# Patient Record
Sex: FEMALE | Race: WHITE | Employment: FULL TIME | URBAN - METROPOLITAN AREA
[De-identification: names, ages, dates, MRNs, and addresses within clinical notes are randomized per-mention and may not be internally consistent; named-entity substitution may affect disease eponyms.]

---

## 2017-05-31 ENCOUNTER — TELEPHONE (OUTPATIENT)
Dept: FAMILY MEDICINE | Facility: CLINIC | Age: 57
End: 2017-05-31

## 2017-05-31 ENCOUNTER — OFFICE VISIT (OUTPATIENT)
Dept: FAMILY MEDICINE | Facility: CLINIC | Age: 57
End: 2017-05-31
Payer: COMMERCIAL

## 2017-05-31 VITALS
TEMPERATURE: 98.9 F | HEART RATE: 79 BPM | DIASTOLIC BLOOD PRESSURE: 84 MMHG | BODY MASS INDEX: 31.53 KG/M2 | SYSTOLIC BLOOD PRESSURE: 143 MMHG | OXYGEN SATURATION: 99 % | WEIGHT: 172.4 LBS

## 2017-05-31 DIAGNOSIS — B02.9 HERPES ZOSTER WITHOUT COMPLICATION: Primary | ICD-10-CM

## 2017-05-31 PROCEDURE — 99213 OFFICE O/P EST LOW 20 MIN: CPT | Performed by: PHYSICIAN ASSISTANT

## 2017-05-31 RX ORDER — VALACYCLOVIR HYDROCHLORIDE 1 G/1
1000 TABLET, FILM COATED ORAL 3 TIMES DAILY
Qty: 21 TABLET | Refills: 0 | Status: SHIPPED | OUTPATIENT
Start: 2017-05-31 | End: 2018-03-06

## 2017-05-31 NOTE — TELEPHONE ENCOUNTER
If she can get here before noon I'll fit her in. If not, I recommend getting into urgent care when it opens tonight.

## 2017-05-31 NOTE — PROGRESS NOTES
SUBJECTIVE:                                                    Muriel Porras is a 57 year old female who presents to clinic today for the following health issues:    Pain started x1.5-2 weeks ago   Under left breast wrapping around rib cage     Rash     Onset: x1 day     Description:   Location: under left breast wrapping around rib cage   Character: pink red irritated   Itching (Pruritis): YES- tingling and painful     Progression of Symptoms:  worsening    Accompanying Signs & Symptoms:  Fever: no   Body aches or joint pain: no   Sore throat symptoms: no   Recent cold symptoms: no    History:   Previous similar rash: no     Precipitating factors:   Exposure to similar rash: no   New exposures: None   Recent travel: no     Alleviating factors:  None      Therapies Tried and outcome: ibuprofen - minimal relief         Problem list and histories reviewed & adjusted, as indicated.  Additional history: as documented    Patient Active Problem List   Diagnosis     Hypothyroidism due to acquired atrophy of thyroid     No past surgical history on file.    Social History   Substance Use Topics     Smoking status: Former Smoker     Packs/day: 1.00     Types: Cigarettes     Quit date: 4/1/2016     Smokeless tobacco: Never Used     Alcohol use 0.0 oz/week     0 Standard drinks or equivalent per week      Comment: Occasional      Family History   Problem Relation Age of Onset     Family history unknown: Yes           Reviewed and updated as needed this visit by clinical staff  Tobacco  Allergies  Meds       Reviewed and updated as needed this visit by Provider         ROS:  Constitutional, skin, neuro systems are negative, except as otherwise noted.    OBJECTIVE:                                                    /84  Pulse 79  Temp 98.9  F (37.2  C) (Oral)  Wt 172 lb 6.4 oz (78.2 kg)  SpO2 99%  BMI 31.53 kg/m2  Body mass index is 31.53 kg/(m^2).  GENERAL APPEARANCE: healthy, alert and no distress  SKIN:  erythematous, raised rash of left torso, underneath breast  PSYCH: mentation appears normal and affect normal/bright       ASSESSMENT:                                                      1. Herpes zoster without complication         PLAN:                                                    Valtrex 1g TID x7 days. Supportive therapy also discussed. Follow up if symptoms fail to improve or worsen.      Patient Instructions     Shingles  Shingles is a viral infection caused by the same virus as chicken pox. Anyone who has had chicken pox may get shingles later in life. The virus stays in the body, but remains dormant (asleep). Shingles often occurs in older persons or persons with lowered immunity. But it can affect anyone at any age.  Shingles starts as a tingling patch of skin on one side of the body. Small, painful blisters may then appear. The rash does not spread to the rest of the body.  Exposure to shingles cannot cause shingles. However, it can cause chicken pox in anyone who has not had chicken pox or has not been vaccinated. The contagious period ends when all blisters have crusted over (generally about 2 weeks after the illness begins).  After the blisters heal, the affected skin may be sensitive or painful for months (neuralgia). This often gradually goes away.  A shingles vaccine is available. This can help prevent shingles or make it less painful. It is generally recommended for adults over the age of 60 who have had chicken pox in the past, but who have never had shingles. Adults over 60 who have had neither chicken pox nor shingles can prevent both diseases with the chicken pox vaccine. Ask your healthcare provider about these vaccines.  Home care    Medicines may be prescribed to help relieve pain. Take these medicines as directed. Ask your healthcare provider or pharmacist before using over-the-counter medicines for helping treat pain and itching.     In certain cases, antiviral medicines may be  prescribed to reduce pain, shorten the illness, and prevent neuralgia. Take these medicines as directed.    Compresses made from a solution of cool water mixed with cornstarch or baking soda may help relieve pain and itching.     Gently wash skin daily with soap and water to help prevent infection.  Be certain to rinse off all of the soap, which can be irritating.    Trim fingernails and try not to scratch. Scratching the sores may leave scars.    Stay home from work or school until all blisters have formed a crust and you are no longer contagious.  Follow-up care  Follow up with your healthcare provider or as directed by our staff.  When to seek medical advice    Fever of 100.4 F (38 C) or higher, or as directed by your healthcare provider    Affected skin is on the face or neck and any of the following occur:                          Headache                          Eye pain                          Changes in vision                          Sores near the eye                          Weakness of facial muscles    Pain, redness, or swelling of a joint    Signs of skin infection: colored drainage from the sores, warmth, increasing redness, or increasing pain    1079-3086 [employer]. [address] [place] [address], [place] [address]. All rights reserved. This information is not intended as a substitute for professional medical care. Always follow your healthcare professional's instructions.             Shruthi Osorio PA-C  [place] [facility]

## 2017-05-31 NOTE — TELEPHONE ENCOUNTER
Patient was scheduled to come in tomorrow June 01, 2017 at 1:20 PM however after speaking to the RN line patient feels that her symptoms ( pain under breast that wraps around to back as well as a rash) are getting worst and thinks she might have shingles. Patient cant be reach at [phone] and stated it was okay to leave a detail message.     Sera Jones

## 2017-05-31 NOTE — NURSING NOTE
"Chief Complaint   Patient presents with     Rib Pain       Initial /84  Pulse 79  Temp 98.9  F (37.2  C) (Oral)  Wt 172 lb 6.4 oz (78.2 kg)  SpO2 99%  BMI 31.53 kg/m2 Estimated body mass index is 31.53 kg/(m^2) as calculated from the following:    Height as of 8/10/16: 5' 2\" (1.575 m).    Weight as of this encounter: 172 lb 6.4 oz (78.2 kg).  Medication Reconciliation: complete   Luna Michaels MA      "

## 2017-05-31 NOTE — TELEPHONE ENCOUNTER
Will send to provider to see if she can work patient in to schedule.  No openings at [place].  Work in or urgent care?

## 2017-05-31 NOTE — PATIENT INSTRUCTIONS
Shingles  Shingles is a viral infection caused by the same virus as chicken pox. Anyone who has had chicken pox may get shingles later in life. The virus stays in the body, but remains dormant (asleep). Shingles often occurs in older persons or persons with lowered immunity. But it can affect anyone at any age.  Shingles starts as a tingling patch of skin on one side of the body. Small, painful blisters may then appear. The rash does not spread to the rest of the body.  Exposure to shingles cannot cause shingles. However, it can cause chicken pox in anyone who has not had chicken pox or has not been vaccinated. The contagious period ends when all blisters have crusted over (generally about 2 weeks after the illness begins).  After the blisters heal, the affected skin may be sensitive or painful for months (neuralgia). This often gradually goes away.  A shingles vaccine is available. This can help prevent shingles or make it less painful. It is generally recommended for adults over the age of 60 who have had chicken pox in the past, but who have never had shingles. Adults over 60 who have had neither chicken pox nor shingles can prevent both diseases with the chicken pox vaccine. Ask your healthcare provider about these vaccines.  Home care    Medicines may be prescribed to help relieve pain. Take these medicines as directed. Ask your healthcare provider or pharmacist before using over-the-counter medicines for helping treat pain and itching.     In certain cases, antiviral medicines may be prescribed to reduce pain, shorten the illness, and prevent neuralgia. Take these medicines as directed.    Compresses made from a solution of cool water mixed with cornstarch or baking soda may help relieve pain and itching.     Gently wash skin daily with soap and water to help prevent infection.  Be certain to rinse off all of the soap, which can be irritating.    Trim fingernails and try not to scratch. Scratching the sores  may leave scars.    Stay home from work or school until all blisters have formed a crust and you are no longer contagious.  Follow-up care  Follow up with your healthcare provider or as directed by our staff.  When to seek medical advice    Fever of 100.4 F (38 C) or higher, or as directed by your healthcare provider    Affected skin is on the face or neck and any of the following occur:                          Headache                          Eye pain                          Changes in vision                          Sores near the eye                          Weakness of facial muscles    Pain, redness, or swelling of a joint    Signs of skin infection: colored drainage from the sores, warmth, increasing redness, or increasing pain    2713-6337 [employer]. [address] [place] [address]. All rights reserved. This information is not intended as a substitute for professional medical care. Always follow your healthcare professional's instructions.

## 2017-05-31 NOTE — MR AVS SNAPSHOT
After Visit Summary   5/31/2017    Muriel Porras    MRN: [id number]           Patient Information     Date Of Birth          1960        Visit Information        Provider Department      5/31/2017 11:40 AM Shruthi Osorio PA-C [facility]        Today's Diagnoses     Herpes zoster without complication    -  1      Care Instructions      Shingles  Shingles is a viral infection caused by the same virus as chicken pox. Anyone who has had chicken pox may get shingles later in life. The virus stays in the body, but remains dormant (asleep). Shingles often occurs in older persons or persons with lowered immunity. But it can affect anyone at any age.  Shingles starts as a tingling patch of skin on one side of the body. Small, painful blisters may then appear. The rash does not spread to the rest of the body.  Exposure to shingles cannot cause shingles. However, it can cause chicken pox in anyone who has not had chicken pox or has not been vaccinated. The contagious period ends when all blisters have crusted over (generally about 2 weeks after the illness begins).  After the blisters heal, the affected skin may be sensitive or painful for months (neuralgia). This often gradually goes away.  A shingles vaccine is available. This can help prevent shingles or make it less painful. It is generally recommended for adults over the age of 60 who have had chicken pox in the past, but who have never had shingles. Adults over 60 who have had neither chicken pox nor shingles can prevent both diseases with the chicken pox vaccine. Ask your healthcare provider about these vaccines.  Home care    Medicines may be prescribed to help relieve pain. Take these medicines as directed. Ask your healthcare provider or pharmacist before using over-the-counter medicines for helping treat pain and itching.     In certain cases, antiviral medicines may be prescribed to reduce pain, shorten the illness, and  prevent neuralgia. Take these medicines as directed.    Compresses made from a solution of cool water mixed with cornstarch or baking soda may help relieve pain and itching.     Gently wash skin daily with soap and water to help prevent infection.  Be certain to rinse off all of the soap, which can be irritating.    Trim fingernails and try not to scratch. Scratching the sores may leave scars.    Stay home from work or school until all blisters have formed a crust and you are no longer contagious.  Follow-up care  Follow up with your healthcare provider or as directed by our staff.  When to seek medical advice    Fever of 100.4 F (38 C) or higher, or as directed by your healthcare provider    Affected skin is on the face or neck and any of the following occur:                          Headache                          Eye pain                          Changes in vision                          Sores near the eye                          Weakness of facial muscles    Pain, redness, or swelling of a joint    Signs of skin infection: colored drainage from the sores, warmth, increasing redness, or increasing pain    3193-9226 [employer]. [address] [place] [address]. All rights reserved. This information is not intended as a substitute for professional medical care. Always follow your healthcare professional's instructions.                Follow-ups after your visit        Your next 10 appointments already scheduled     May 31, 2017 11:40 AM CDT   (Arrive by 10:00 AM)   Office Visit with Shruthi Osorio PA-C   [facility] ([facility])    [address] [facility] [id number] [phone]           Bring a current list of meds and any records pertaining to this visit.  For Physicals, please bring immunization records and any forms needing to be filled out.  Please arrive 10 minutes early to complete paperwork.              Who to contact     Normal or  non-critical lab and imaging results will be communicated to you by [employer] Juices[employer], letter or phone within 4 business days after the clinic has received the results. If you do not hear from us within 7 days, please contact the clinic through [employer] or phone. If you have a critical or abnormal lab result, we will notify you by phone as soon as possible.  Submit refill requests through [employer] or call your pharmacy and they will forward the refill request to us. Please allow 3 business days for your refill to be completed.          If you need to speak with a  for additional information , please call: [phone]             Additional Information About Your Visit        [employer] Information     [employer] gives you secure access to your electronic health record. If you see a primary care provider, you can also send messages to your care team and make appointments. If you have questions, please call your primary care clinic.  If you do not have a primary care provider, please call [phone] and they will assist you.        Care EveryWhere ID     This is your Care EveryWhere ID. This could be used by other organizations to access your [facility] medical records  [id number]        Your Vitals Were     Pulse Temperature Pulse Oximetry BMI (Body Mass Index)          79 98.9  F (37.2  C) (Oral) 99% 31.53 kg/m2         Blood Pressure from Last 3 Encounters:   05/31/17 143/84   12/27/16 126/74   05/25/16 118/75    Weight from Last 3 Encounters:   05/31/17 172 lb 6.4 oz (78.2 kg)   12/27/16 178 lb (80.7 kg)   10/04/16 169 lb (76.7 kg)              Today, you had the following     No orders found for display         Today's Medication Changes          These changes are accurate as of: 5/31/17 11:38 AM.  If you have any questions, ask your nurse or doctor.               Start taking these medicines.        Dose/Directions    valACYclovir 1000 mg tablet   Commonly known as:  VALTREX   Used for:  Herpes zoster without  complication   Started by:  Shruthi Osorio PA-C        Dose:  1000 mg   Take 1 tablet (1,000 mg) by mouth 3 times daily for 7 days   Quantity:  21 tablet   Refills:  0            Where to get your medicines      These medications were sent to [facility] - [address] [facility] [address]  [address] [address] [place] [address]     Phone:  [phone]     valACYclovir 1000 mg tablet                Primary Care Provider Office Phone #    [facility] [phone]       No address on file        Thank you!     Thank you for choosing [facility]  for your care. Our goal is always to provide you with excellent care. Hearing back from our patients is one way we can continue to improve our services. Please take a few minutes to complete the written survey that you may receive in the mail after your visit with us. Thank you!             Your Updated Medication List - Protect others around you: Learn how to safely use, store and throw away your medicines at www.disposemymeds.org.          This list is accurate as of: 5/31/17 11:38 AM.  Always use your most recent med list.                   Brand Name Dispense Instructions for use    LEVOTHYROXINE SODIUM PO          valACYclovir 1000 mg tablet    VALTREX    21 tablet    Take 1 tablet (1,000 mg) by mouth 3 times daily for 7 days

## 2017-05-31 NOTE — NURSING NOTE
Reminder placed to call patient in September for physical as she was not ready to schedule when in clinic today.

## 2017-06-03 ENCOUNTER — TELEPHONE (OUTPATIENT)
Dept: NURSING | Facility: CLINIC | Age: 57
End: 2017-06-03

## 2017-06-03 NOTE — TELEPHONE ENCOUNTER
Patient was seen by Dr. Lopez at [facility] on 5/3117 and diagnosed with shingles.  Started on antiviral medication.  Calls today and reports increased intensity of pain and new outbreaks of rash.  Paged on call, Dr. Fariha Urena MD via answering service at 1120.  Call back from Dr. Urena.  New order:    Gabapentin 100 mg.   Take 1 pill by mouth BID today  Beginning tomorrow take 1 pill TID.   Dispense 90 pills.  No refills.  TORB Dr. Urena/Catherine Mckinnon RN       Called prescription to [facility] on [address] [facility] per pt request.       Patient may also take ibuprofen 600 mg three times per day with food. And Tylenol 325 mg 2 tabs every 6 hours.     If pain not improving in a few days, call Dr. Person and see about increasing frequency of gabapentin.     Notified patient of these instructions and called Gabapentin prescription into [facility].  VELIA Valiente

## 2017-06-03 NOTE — PATIENT INSTRUCTIONS
Paged on call, Dr. Fariha Urena MD via answering service at 1120.  Call back from Dr. Urena.  New order:    Gabapentin 100 mg.   Take 1 pill by mouth BID today  Beginning tomorrow take 1 pill TID.   Dispense 90 pills.  No refills.  TORB Dr. Urena/Catherine Mckinnon RN       Called prescription to [facility] on [address] in [place] per pt request.       Patient may also take ibuprofen 600 mg three times per day with food. And Tylenol 325 mg 2 tabs every 6 hours.     If pain not improving in a few days, call Dr. Person and see about increasing frequency of gabapentin.     Notified patient of these instructions and called Gabapentin prescription into [facility].  VELIA Valiente

## 2017-06-05 ENCOUNTER — TELEPHONE (OUTPATIENT)
Dept: FAMILY MEDICINE | Facility: CLINIC | Age: 57
End: 2017-06-05

## 2017-06-05 NOTE — TELEPHONE ENCOUNTER
Discussed course with pt , as long as blisters are covered she can go back to work .  She states her work wont let her if she is contagious

## 2017-06-05 NOTE — TELEPHONE ENCOUNTER
Patient diagnosed with shingles last Wednesday. She has now developed some blisters. At what point can she go back to work? How long is she contagious?  Ok to leave a message.

## 2017-06-08 ENCOUNTER — MYC MEDICAL ADVICE (OUTPATIENT)
Dept: FAMILY MEDICINE | Facility: CLINIC | Age: 57
End: 2017-06-08

## 2017-06-08 NOTE — LETTER
[facility]  [address]  [place] [address]  Phone: [phone]    June 8, 2017        Muriel Porras  [address] [address]          To whom it may concern:    RE: Muriel Porras    Patient was seen and treated at our clinic on 5/31/17. She may return to work as of tomorrow, 6/9/17. As long as the shingles rash is covered she is not considered contagious. Shingles only spreads by contact with open, oozing blisters.    Please contact me for questions or concerns.      Sincerely,          Shruthi Osorio PA-C

## 2017-06-08 NOTE — LETTER
[facility]  [address]  [place] [address]  Phone: [phone]    June 9, 2017        Muriel Porras  [address] [address]          To whom it may concern:    RE: Muriel Porras    Patient was seen and treated at our clinic on 5/31/17. She may return to work as of 6/12/17. As long as the shingles rash is covered she is not considered contagious. Shingles only spreads by contact with open, oozing blisters.    Please contact me for questions or concerns.      Sincerely,          Shruthi Osorio PA-C

## 2017-12-31 ENCOUNTER — HEALTH MAINTENANCE LETTER (OUTPATIENT)
Age: 57
End: 2017-12-31

## 2018-03-06 ENCOUNTER — OFFICE VISIT (OUTPATIENT)
Dept: PODIATRY | Facility: CLINIC | Age: 58
End: 2018-03-06
Payer: COMMERCIAL

## 2018-03-06 VITALS
HEIGHT: 63 IN | WEIGHT: 142 LBS | BODY MASS INDEX: 25.16 KG/M2 | DIASTOLIC BLOOD PRESSURE: 56 MMHG | SYSTOLIC BLOOD PRESSURE: 114 MMHG

## 2018-03-06 DIAGNOSIS — M21.6X9 ACQUIRED PRONATION DEFORMITY OF ANKLE, UNSPECIFIED LATERALITY: Primary | ICD-10-CM

## 2018-03-06 DIAGNOSIS — M76.822 POSTERIOR TIBIAL TENDINITIS OF LEFT LOWER EXTREMITY: ICD-10-CM

## 2018-03-06 PROCEDURE — 99213 OFFICE O/P EST LOW 20 MIN: CPT | Performed by: PODIATRIST

## 2018-03-06 NOTE — PATIENT INSTRUCTIONS
We wish you continued good healing. If you have any questions or concerns, please do not hesitate to contact us at [phone]    Please remember to call and schedule a follow up appointment if one was recommended at your earliest convenience.   PODIATRY CLINIC HOURS  TELEPHONE NUMBER    Dr. [place] Anaya D.P.M [facility]    Clinics:  [facility]    [facility] [place] [facility]   Tuesday 1PM-6PM  [place]  Wednesday 7AM-2PM  [facility]  Thursday 10AM-6PM  [place]  Friday 7AM-3PM  [place]  Specialty schedulers:   [phone] to make an appointment with any Specialty Provider.        Urgent Care locations:    [facility] Monday-Friday 5 pm - 9 pm. Saturday-Sunday 9 am -5pm    Monday-Friday 11 am - 9 pm Saturday 9 am - 5 pm     Monday-Sunday 12 noon-8PM [phone]     If you need a medication refill, please contact us you may need lab work and/or a follow up visit prior to your refill (i.e. Antifungal medications).    Remindt (secure e-mail communication and access to your chart) to send a message or to make an appointment.    If MRI needed please call [place] [facility] at [phone]        Weight management plan: Patient was referred to their PCP to discuss a diet and exercise plan.

## 2018-03-06 NOTE — MR AVS SNAPSHOT
After Visit Summary   3/6/2018    Muriel Porras    MRN: [id number]           Patient Information     Date Of Birth          1960        Visit Information        Provider Department      3/6/2018 5:45 PM [place] Anaya DPM [facility] [place]        Care Instructions    We wish you continued good healing. If you have any questions or concerns, please do not hesitate to contact us at [phone]    Please remember to call and schedule a follow up appointment if one was recommended at your earliest convenience.   PODIATRY CLINIC HOURS  TELEPHONE NUMBER    Dr. [place] Anaya DKennyPKARYN FAC FAS    Clinics:  [facility]    [facility]   Tuesday 1PM-6PM  [facility]  Wednesday 7AM-2PM  [facility]  Thursday 10AM-6PM  [place]  Friday 7AM-3PM  [place]  Specialty schedulers:   [phone] to make an appointment with any Specialty Provider.        Urgent Care locations:    [facility] Monday-Friday 5 pm - 9 pm. Saturday-Sunday 9 am -5pm    Monday-Friday 11 am - 9 pm Saturday 9 am - 5 pm     Monday-Sunday 12 noon-8PM [phone]     If you need a medication refill, please contact us you may need lab work and/or a follow up visit prior to your refill (i.e. Antifungal medications).    [employer] (secure e-mail communication and access to your chart) to send a message or to make an appointment.    If MRI needed please call [facility] at [phone]        Weight management plan: Patient was referred to their PCP to discuss a diet and exercise plan.            Follow-ups after your visit        Your next 10 appointments already scheduled     Mar 06, 2018  5:45 PM CST   Return Visit with [facility] ([facility])    [address]  [address]  [place]  [phone]              Who to contact     If you have questions or need follow up information about today's clinic visit or your schedule please contact [facility] [place] directly at [phone].  Normal or non-critical lab and imaging results will be communicated to you by MyChart, letter or phone within 4 business days after the clinic has received the results. If you do not hear from us within 7 days, please contact the clinic through [employer] or phone. If you have a critical or abnormal lab result, we will notify you by phone as soon as possible.  Submit refill requests through [employer] or call your pharmacy and they will forward the refill request to us. Please allow 3 business days for your refill to be completed.          Additional Information About Your Visit        [employer] Information     [employer] gives you secure access to your electronic health record. If you see a primary care provider, you can also send messages to your care team and make appointments. If you have questions, please call your primary care clinic.  If you do not have a primary care provider, please call [phone] and they will assist you.        Care EveryWhere ID     This is your Care EveryWhere ID. This could be used by other organizations to access your [place] medical records  [id number]        Your Vitals Were     Height BMI (Body Mass Index)                5' 3\" (1.6 m) 25.15 kg/m2           Blood Pressure from Last 3 Encounters:   03/06/18 114/56   05/31/17 143/84   12/27/16 126/74    Weight from Last 3 Encounters:   03/06/18 142 lb (64.4 kg)   05/31/17 172 lb 6.4 oz (78.2 kg)   12/27/16 178 lb (80.7 kg)              Today, you had the following     No orders found for display       Primary Care Provider Office Phone # Fax #    Shruthi Osorio PA-C [phone]       [address] [place] [address]        Equal Access to Services     DILCIA HERMAN AH: Hadii aad ku " maria teresa Martinez, nomi garsiaangieha, oscar katayla ghadalisa, ede christianoin hayaajeana urrutiajenn rajendracarly laarletjeana johnathon. So [facility] [phone].    ATENCIÓN: Si habla español, tiene a perez disposición servicios gratuitos de asistencia lingüística. Kaden al [phone].    We comply with applicable federal civil rights laws and [place] laws. We do not discriminate on the basis of race, color, national origin, age, disability, sex, sexual orientation, or gender identity.            Thank you!     Thank you for choosing [facility]  for your care. Our goal is always to provide you with excellent care. Hearing back from our patients is one way we can continue to improve our services. Please take a few minutes to complete the written survey that you may receive in the mail after your visit with us. Thank you!             Your Updated Medication List - Protect others around you: Learn how to safely use, store and throw away your medicines at www.disposemymeds.org.          This list is accurate as of 3/6/18  5:37 PM.  Always use your most recent med list.                   Brand Name Dispense Instructions for use Diagnosis    LEVOTHYROXINE SODIUM PO

## 2018-03-06 NOTE — LETTER
"    3/6/2018         RE: Muriel Porras  [address]  [place] [place] [address]        Dear Colleague,    Thank you for referring your patient, Muriel Porras, to the [facility]. Please see a copy of my visit note below.    S:  Complains of left foot pain.  Points to posterior tibial tendon where is courses around the medial malleoli.  Has had this for 3 months.  Describes it as a burning pain.  Aggrevated by activity and relieved by rest.  Getting better with compression on ankle, less edema.  Standing at work.  She wears p wing orthotics all the time, even in the house.  denies weakness.  denies drop in arch.  admitsedema.  denies ecchymosis.    ROS:  A 10-point review of systems was performed and is positive for that noted in the HPI and as seen below.  All other areas are negative.        Allergies   Allergen Reactions     Aspirin      Mouth lesions        Current Outpatient Prescriptions   Medication Sig Dispense Refill     LEVOTHYROXINE SODIUM PO          Patient Active Problem List   Diagnosis     Hypothyroidism due to acquired atrophy of thyroid       Past Medical History:   Diagnosis Date     Thyroid disease        No past surgical history on file.    Family History   Problem Relation Age of Onset     Family history unknown: Yes       Social History   Substance Use Topics     Smoking status: Former Smoker     Packs/day: 1.00     Types: Cigarettes     Quit date: 4/1/2016     Smokeless tobacco: Never Used     Alcohol use 0.0 oz/week     0 Standard drinks or equivalent per week      Comment: Occasional          Exam:  Vitals: /56 (BP Location: Right arm, Patient Position: Sitting, Cuff Size: Adult Regular)  Ht 5' 3\" (1.6 m)  Wt 142 lb (64.4 kg)  BMI 25.15 kg/m2  BMI: Body mass index is 25.15 kg/(m^2).  Height: 5' 3\"    Constitutional/ general:  Pt is in no apparent distress, appears well-nourished.  Cooperative with history and physical exam.     Psych:  The patient " answered questions appropriately.  Normal affect.  Seems to have reasonable expectations, in terms of treatment.     Eyes:  Visual scanning/ tracking without deficit.     Ears:  Response to auditory stimuli is normal.  No  hearing aid devices.  Auricles in proper alignment.     Lymphatic:  Popliteal lymph nodes not enlarged.     Lungs:  Non labored breathing, non labored speech. No cough.  No audible wheezing. Even, quiet breathing.       Vascular:  Pedal pulses are palpable bilaterally for both the DP and PT arteries.  CFT < 3 sec.  mild ankle edema and varicosities.  Pedal hair growth noted.     Neuro:  Alert and oriented x 3. Coordinated gait.  Light touch sensation is intact to the L4, L5, S1 distributions. No obvious deficits.  No evidence of neurological-based weakness, spasticity, or contracture in the lower extremities.    Derm: Normal texture and turgor.  No erythema, ecchymosis, or cyanosis.  No open lesions.     Musculoskeletal:    Lower extremity muscle strength is normal.  Patient is ambulatory without an assistive device or brace.  No gross deformities.  Normal ROM all fore foot and rearfoot joints.  No equinus.  With weightbearing patient has bilateral pronation with left being slightly worse.  No pain with ROM.   Pain with palpation posterior tibial tendon coarse around medial malleoli.  positive pain with stressing posterior tibial tendon.  Good calcaneal iversion with foot flexion.  negative erythema.  positive edema.  negative ecchymosis.   negative masses noted.        A:  Pronation and posterior tibial tendonitis    P:  Discussed the cause of this with the patient.  Dispensed ankle brace to be worn with good shoes at all times.  Ice bid.  Minimize activities until healed.  Explained must have good support for feet at all times or could develop tear in tendon and may need surgery.    Discussed importance of wearing these in a good shoe at all times to prevent future problems.   Return to clinic 2  weeks to ensure this is getting better.      [place] Anaya DPM, FACFAS           Again, thank you for allowing me to participate in the care of your patient.        Sincerely,        [place] Anaya DPM

## 2018-03-07 NOTE — PROGRESS NOTES
"S:  Complains of left foot pain.  Points to posterior tibial tendon where is courses around the medial malleoli.  Has had this for 3 months.  Describes it as a burning pain.  Aggrevated by activity and relieved by rest.  Getting better with compression on ankle, less edema.  Standing at work.  She wears p wing orthotics all the time, even in the house.  denies weakness.  denies drop in arch.  admitsedema.  denies ecchymosis.    ROS:  A 10-point review of systems was performed and is positive for that noted in the HPI and as seen below.  All other areas are negative.        Allergies   Allergen Reactions     Aspirin      Mouth lesions        Current Outpatient Prescriptions   Medication Sig Dispense Refill     LEVOTHYROXINE SODIUM PO          Patient Active Problem List   Diagnosis     Hypothyroidism due to acquired atrophy of thyroid       Past Medical History:   Diagnosis Date     Thyroid disease        No past surgical history on file.    Family History   Problem Relation Age of Onset     Family history unknown: Yes       Social History   Substance Use Topics     Smoking status: Former Smoker     Packs/day: 1.00     Types: Cigarettes     Quit date: 4/1/2016     Smokeless tobacco: Never Used     Alcohol use 0.0 oz/week     0 Standard drinks or equivalent per week      Comment: Occasional          Exam:  Vitals: /56 (BP Location: Right arm, Patient Position: Sitting, Cuff Size: Adult Regular)  Ht 5' 3\" (1.6 m)  Wt 142 lb (64.4 kg)  BMI 25.15 kg/m2  BMI: Body mass index is 25.15 kg/(m^2).  Height: 5' 3\"    Constitutional/ general:  Pt is in no apparent distress, appears well-nourished.  Cooperative with history and physical exam.     Psych:  The patient answered questions appropriately.  Normal affect.  Seems to have reasonable expectations, in terms of treatment.     Eyes:  Visual scanning/ tracking without deficit.     Ears:  Response to auditory stimuli is normal.  No  hearing aid devices.  Auricles in " proper alignment.     Lymphatic:  Popliteal lymph nodes not enlarged.     Lungs:  Non labored breathing, non labored speech. No cough.  No audible wheezing. Even, quiet breathing.       Vascular:  Pedal pulses are palpable bilaterally for both the DP and PT arteries.  CFT < 3 sec.  mild ankle edema and varicosities.  Pedal hair growth noted.     Neuro:  Alert and oriented x 3. Coordinated gait.  Light touch sensation is intact to the L4, L5, S1 distributions. No obvious deficits.  No evidence of neurological-based weakness, spasticity, or contracture in the lower extremities.    Derm: Normal texture and turgor.  No erythema, ecchymosis, or cyanosis.  No open lesions.     Musculoskeletal:    Lower extremity muscle strength is normal.  Patient is ambulatory without an assistive device or brace.  No gross deformities.  Normal ROM all fore foot and rearfoot joints.  No equinus.  With weightbearing patient has bilateral pronation with left being slightly worse.  No pain with ROM.   Pain with palpation posterior tibial tendon coarse around medial malleoli.  positive pain with stressing posterior tibial tendon.  Good calcaneal iversion with foot flexion.  negative erythema.  positive edema.  negative ecchymosis.   negative masses noted.        A:  Pronation and posterior tibial tendonitis    P:  Discussed the cause of this with the patient.  Dispensed ankle brace to be worn with good shoes at all times.  Ice bid.  Minimize activities until healed.  Explained must have good support for feet at all times or could develop tear in tendon and may need surgery.    Discussed importance of wearing these in a good shoe at all times to prevent future problems.   Return to clinic 2 weeks to ensure this is getting better.      [place] Anaya DPM, FACFAS

## 2018-06-07 ENCOUNTER — TRANSFERRED RECORDS (OUTPATIENT)
Dept: HEALTH INFORMATION MANAGEMENT | Facility: CLINIC | Age: 58
End: 2018-06-07

## 2018-06-12 ENCOUNTER — HOSPITAL ENCOUNTER (OUTPATIENT)
Dept: MRI IMAGING | Facility: CLINIC | Age: 58
Discharge: HOME | End: 2018-06-12
Attending: PODIATRIST | Admitting: PODIATRIST
Payer: COMMERCIAL

## 2018-06-12 DIAGNOSIS — M79.672 LEFT FOOT PAIN: ICD-10-CM

## 2018-06-12 PROCEDURE — 73721 MRI JNT OF LWR EXTRE W/O DYE: CPT | Mod: LT

## 2018-06-21 ENCOUNTER — TRANSFERRED RECORDS (OUTPATIENT)
Dept: HEALTH INFORMATION MANAGEMENT | Facility: CLINIC | Age: 58
End: 2018-06-21

## 2018-11-20 ENCOUNTER — OFFICE VISIT (OUTPATIENT)
Dept: PODIATRY | Facility: CLINIC | Age: 58
End: 2018-11-20
Payer: COMMERCIAL

## 2018-11-20 VITALS
BODY MASS INDEX: 27.46 KG/M2 | WEIGHT: 155 LBS | HEART RATE: 100 BPM | SYSTOLIC BLOOD PRESSURE: 130 MMHG | DIASTOLIC BLOOD PRESSURE: 70 MMHG

## 2018-11-20 DIAGNOSIS — L60.0 INGROWING NAIL: Primary | ICD-10-CM

## 2018-11-20 PROCEDURE — 99213 OFFICE O/P EST LOW 20 MIN: CPT | Performed by: PODIATRIST

## 2018-11-20 NOTE — MR AVS SNAPSHOT
After Visit Summary   11/20/2018    Muriel Porras    MRN: [id number]           Patient Information     Date Of Birth          1960        Visit Information        Provider Department      11/20/2018 1:15 PM [place] [facility]aya DPM [facility]        Today's Diagnoses     Ingrowing nail    -  1      Care Instructions    We wish you continued good healing. If you have any questions or concerns, please do not hesitate to contact us at [phone]    Please remember to call and schedule a follow up appointment if one was recommended at your earliest convenience.   PODIATRY CLINIC HOURS  TELEPHONE NUMBER    Dr. [place] MUHAMMADPKARYN FAC FAS    Clinics:  [facility]    [facility] [place] [facility]   Tuesday 1PM-6PM  [place]  Wednesday 7AM-2PM  [facility]  Thursday 10AM-6PM  [place]  Friday 7AM-3PM  [place]  Specialty schedulers:   [phone] to make an appointment with any Specialty Provider.        Urgent Care locations:    [facility] Monday-Friday 5 pm - 9 pm. Saturday-Sunday 9 am -5pm    Monday-Friday 11 am - 9 pm Saturday 9 am - 5 pm     Monday-Sunday 12 noon-8PM [phone]     If you need a medication refill, please contact us you may need lab work and/or a follow up visit prior to your refill (i.e. Antifungal medications).    [email or web address] (secure e-mail communication and access to your chart) to send a message or to make an appointment.    If MRI needed please call [facility] at [phone]        Weight management plan: Patient was referred to their PCP to discuss a diet and exercise plan.               Follow-ups after your visit        Your next 10 appointments already scheduled     Nov 27, 2018  3:30 PM CST   Return Visit with [place] Anaya DPM   [facility] [place] ([facility])    [address]  [place] [address] [phone]              Who to contact     If you have questions or need follow up information about today's clinic visit or your schedule please contact [facility] directly at [phone].  Normal or non-critical lab and imaging results will be communicated to you by MyChart, letter or phone within 4 business days after the clinic has received the results. If you do not hear from us within 7 days, please contact the clinic through [employer] or phone. If you have a critical or abnormal lab result, we will notify you by phone as soon as possible.  Submit refill requests through [employer] or call your pharmacy and they will forward the refill request to us. Please allow 3 business days for your refill to be completed.          Additional Information About Your Visit        [employer] Information     [employer] gives you secure access to your electronic health record. If you see a primary care provider, you can also send messages to your care team and make appointments. If you have questions, please call your primary care clinic.  If you do not have a primary care provider, please call [phone] and they will assist you.        Care EveryWhere ID     This is your Care EveryWhere ID. This could be used by other organizations to access your [place] medical records  [id number]        Your Vitals Were     Pulse BMI (Body Mass Index)                100 27.46 kg/m2           Blood Pressure from Last 3 Encounters:   11/20/18 130/70   03/06/18 114/56   05/31/17 143/84    Weight from Last 3 Encounters:   11/20/18 155 lb (70.3 kg)   03/06/18 142 lb (64.4 kg)   05/31/17 172 lb 6.4 oz (78.2 kg)              Today, you had the following     No orders found for display       Primary Care Provider Office Phone # Fax #    Shruthi Osorio PA-C [phone]       [address]  [place] [address]        Equal Access to Services     DILCIA HERMAN AH: Hadii aad ku  maria teresa Martinez, nomi garsiaangieha, oscar katayla ghadalisa, ede christianoin hayaajeana urrutiajenn rajendracarly laWallaceshan johnathon. So [facility] [phone].    ATENCIÓN: Si habla español, tiene a perez disposición servicios gratuitos de asistencia lingüística. Kaden al [phone].    We comply with applicable federal civil rights laws and [place] laws. We do not discriminate on the basis of race, color, national origin, age, disability, sex, sexual orientation, or gender identity.            Thank you!     Thank you for choosing [facility]  for your care. Our goal is always to provide you with excellent care. Hearing back from our patients is one way we can continue to improve our services. Please take a few minutes to complete the written survey that you may receive in the mail after your visit with us. Thank you!             Your Updated Medication List - Protect others around you: Learn how to safely use, store and throw away your medicines at www.disposemymeds.org.          This list is accurate as of 11/20/18  3:26 PM.  Always use your most recent med list.                   Brand Name Dispense Instructions for use Diagnosis    LEVOTHYROXINE SODIUM PO

## 2018-11-20 NOTE — LETTER
11/20/2018         RE: Muriel Porras  [address]  [place] [address]        Dear Colleague,    Thank you for referring your patient, Muriel Porras, to the [facility]. Please see a copy of my visit note below.    Subjective:    Pt is seen today w/ the c/c of a painful ingrown right and left great nail both border.  This has been problematic intermittently for several years.  Had permanent right lateral in pas but failed.  Oral antifungals in the past did not help. This is slowly getting worse.  Aggravated by activity and relieved by rest.  Has tried soaking which has not helped.   denies history of trauma to the area.    ROS:  A 10-point review of systems was performed and is positive for that noted in the HPI and as seen above.  All other areas are negative.          Allergies   Allergen Reactions     Aspirin      Mouth lesions        Current Outpatient Prescriptions   Medication Sig Dispense Refill     LEVOTHYROXINE SODIUM PO          Patient Active Problem List   Diagnosis     Hypothyroidism due to acquired atrophy of thyroid       Past Medical History:   Diagnosis Date     Thyroid disease        No past surgical history on file.    Family History   Problem Relation Age of Onset     Family history unknown: Yes       Social History   Substance Use Topics     Smoking status: Former Smoker     Packs/day: 1.00     Types: Cigarettes     Quit date: 4/1/2016     Smokeless tobacco: Never Used     Alcohol use 0.0 oz/week     0 Standard drinks or equivalent per week      Comment: Occasional          Exam:    Vitals: /70  Pulse 100  Wt 155 lb (70.3 kg)  BMI 27.46 kg/m2  BMI: Body mass index is 27.46 kg/(m^2).  Height: Data Unavailable    Constitutional/ general:  Pt is in no apparent distress, appears well-nourished.  Cooperative with history and physical exam.     Psych:  The patient answered questions appropriately.  Normal affect.  Seems to have reasonable expectations, in terms of  treatment.     Eyes:  Visual scanning/ tracking without deficit.     Ears:  Response to auditory stimuli is normal.  negative hearing aid devices.  Auricles in proper alignment.     Lymphatic:  Popliteal lymph nodes not enlarged.     Lungs:  Non labored breathing, non labored speech. No cough.  No audible wheezing. Even, quiet breathing.       Vascular:  positive pedal pulses bilaterally for both the DP and PT arteries.  CFT < 3 sec.  negative ankle edema.  positive pedal hair growth.    Neuro:  Alert and oriented x 3. Coordinated gait.  Light touch sensation is intact to the L4, L5, S1 distributions. No obvious deficits.  No evidence of neurological-based weakness, spasticity, or contracture in the lower extremities.     Derm: Normal texture and turgor.  No erythema, ecchymosis, or cyanosis.      Musculoskeletal:    Lower extremity muscle strength is normal.  Patient is ambulatory without an assistive device or brace.  Normal arch with weightbearing.  No forefoot or rear foot deformities noted.   Normal ROM all fore foot and rearfoot joints.  No equinus.  right and left great toe nail both border shows soft tissue impingement with localized erythema.   negative active drainage/purulence at this time.  No sinus tracts.  No nailbed masses or exostosis.  No pain with range of motion of IPJ or MTPJ.  No ascending cellulitis.    ASSESSMENT:    Onychocryptosis with paronychia right and left great both border.    Discussed etiology and treatment options in detail w/ the pt.  The potential causes and nature of an ingrown toenail were discussed with the patient.  We reviewed the natural history/prognosis of the condition and potential risks if no treatment is provided.      Treatment options discussed included conservative management (oral antibiotics, soaking of foot, adequate width shoes)  as well as surgical management (partial or total nail removal).  The pros and cons of both forms of treatment were reviewed.  Handout  given to patient.      After thorough discussion and answering all questions, the patient elected to have permanent removal of affected nail border.  Risk complications and efficacy discussed with patient.  Will set this up for 1 hour appointment in the future.      [place] Anaya DPM, FACFAS      Again, thank you for allowing me to participate in the care of your patient.        Sincerely,        [place] Anaya DPM

## 2018-11-20 NOTE — PATIENT INSTRUCTIONS
We wish you continued good healing. If you have any questions or concerns, please do not hesitate to contact us at [phone]    Please remember to call and schedule a follow up appointment if one was recommended at your earliest convenience.   PODIATRY CLINIC HOURS  TELEPHONE NUMBER    Dr. [place] Anaya D.P.M [facility]    Clinics:  [facility]    Karly Morris [facility]   Tuesday 1PM-6PM  [place]  Wednesday 7AM-2PM  [facility]  Thursday 10AM-6PM  [place]  Friday 7AM-3PM  [place]  Specialty schedulers:   [phone] to make an appointment with any Specialty Provider.        Urgent Care locations:    [facility] Monday-Friday 5 pm - 9 pm. Saturday-Sunday 9 am -5pm    Monday-Friday 11 am - 9 pm Saturday 9 am - 5 pm     Monday-Sunday 12 noon-8PM [phone]     If you need a medication refill, please contact us you may need lab work and/or a follow up visit prior to your refill (i.e. Antifungal medications).    [employer] (secure e-mail communication and access to your chart) to send a message or to make an appointment.    If MRI needed please call [facility] at [phone]        Weight management plan: Patient was referred to their PCP to discuss a diet and exercise plan.

## 2018-11-20 NOTE — PROGRESS NOTES
Subjective:    Pt is seen today w/ the c/c of a painful ingrown right and left great nail both border.  This has been problematic intermittently for several years.  Had permanent right lateral in pas but failed.  Oral antifungals in the past did not help. This is slowly getting worse.  Aggravated by activity and relieved by rest.  Has tried soaking which has not helped.   denies history of trauma to the area.    ROS:  A 10-point review of systems was performed and is positive for that noted in the HPI and as seen above.  All other areas are negative.          Allergies   Allergen Reactions     Aspirin      Mouth lesions        Current Outpatient Prescriptions   Medication Sig Dispense Refill     LEVOTHYROXINE SODIUM PO          Patient Active Problem List   Diagnosis     Hypothyroidism due to acquired atrophy of thyroid       Past Medical History:   Diagnosis Date     Thyroid disease        No past surgical history on file.    Family History   Problem Relation Age of Onset     Family history unknown: Yes       Social History   Substance Use Topics     Smoking status: Former Smoker     Packs/day: 1.00     Types: Cigarettes     Quit date: 4/1/2016     Smokeless tobacco: Never Used     Alcohol use 0.0 oz/week     0 Standard drinks or equivalent per week      Comment: Occasional          Exam:    Vitals: /70  Pulse 100  Wt 155 lb (70.3 kg)  BMI 27.46 kg/m2  BMI: Body mass index is 27.46 kg/(m^2).  Height: Data Unavailable    Constitutional/ general:  Pt is in no apparent distress, appears well-nourished.  Cooperative with history and physical exam.     Psych:  The patient answered questions appropriately.  Normal affect.  Seems to have reasonable expectations, in terms of treatment.     Eyes:  Visual scanning/ tracking without deficit.     Ears:  Response to auditory stimuli is normal.  negative hearing aid devices.  Auricles in proper alignment.     Lymphatic:  Popliteal lymph nodes not enlarged.     Lungs:   Non labored breathing, non labored speech. No cough.  No audible wheezing. Even, quiet breathing.       Vascular:  positive pedal pulses bilaterally for both the DP and PT arteries.  CFT < 3 sec.  negative ankle edema.  positive pedal hair growth.    Neuro:  Alert and oriented x 3. Coordinated gait.  Light touch sensation is intact to the L4, L5, S1 distributions. No obvious deficits.  No evidence of neurological-based weakness, spasticity, or contracture in the lower extremities.     Derm: Normal texture and turgor.  No erythema, ecchymosis, or cyanosis.      Musculoskeletal:    Lower extremity muscle strength is normal.  Patient is ambulatory without an assistive device or brace.  Normal arch with weightbearing.  No forefoot or rear foot deformities noted.   Normal ROM all fore foot and rearfoot joints.  No equinus.  right and left great toe nail both border shows soft tissue impingement with localized erythema.   negative active drainage/purulence at this time.  No sinus tracts.  No nailbed masses or exostosis.  No pain with range of motion of IPJ or MTPJ.  No ascending cellulitis.    ASSESSMENT:    Onychocryptosis with paronychia right and left great both border.    Discussed etiology and treatment options in detail w/ the pt.  The potential causes and nature of an ingrown toenail were discussed with the patient.  We reviewed the natural history/prognosis of the condition and potential risks if no treatment is provided.      Treatment options discussed included conservative management (oral antibiotics, soaking of foot, adequate width shoes)  as well as surgical management (partial or total nail removal).  The pros and cons of both forms of treatment were reviewed.  Handout given to patient.      After thorough discussion and answering all questions, the patient elected to have permanent removal of affected nail border.  Risk complications and efficacy discussed with patient.  Will set this up for 1 hour  appointment in the future.      Jsoe TSEM, FACFAS

## 2018-11-27 ENCOUNTER — OFFICE VISIT (OUTPATIENT)
Dept: PODIATRY | Facility: CLINIC | Age: 58
End: 2018-11-27
Payer: COMMERCIAL

## 2018-11-27 VITALS — DIASTOLIC BLOOD PRESSURE: 54 MMHG | WEIGHT: 155 LBS | SYSTOLIC BLOOD PRESSURE: 136 MMHG | BODY MASS INDEX: 27.46 KG/M2

## 2018-11-27 DIAGNOSIS — L60.0 INGROWING NAIL: Primary | ICD-10-CM

## 2018-11-27 PROCEDURE — 11750 EXCISION NAIL&NAIL MATRIX: CPT | Mod: TA | Performed by: PODIATRIST

## 2018-11-27 NOTE — MR AVS SNAPSHOT
After Visit Summary   11/27/2018    Muriel Porras    MRN: [id number]           Patient Information     Date Of Birth          1960        Visit Information        Provider Department      11/27/2018 3:30 PM Jose Anaya, GAL [facility]        Today's Diagnoses     Ingrowing nail    -  1      Care Instructions    We wish you continued good healing. If you have any questions or concerns, please do not hesitate to contact us at [phone]    Please remember to call and schedule a follow up appointment if one was recommended at your earliest convenience.   PODIATRY CLINIC HOURS  TELEPHONE NUMBER    Dr. Jose MUHAMMADPKARYN FAC FAS    Clinics:  [facility]    Karly Morris [facility]   Tuesday 1PM-6PM  [place]  Wednesday 7AM-2PM  [facility]  Thursday 10AM-6PM  [place]  Friday 7AM-3PM  [place]  Specialty schedulers:   [phone] to make an appointment with any Specialty Provider.        Urgent Care locations:    [facility] Monday-Friday 5 pm - 9 pm. Saturday-Sunday 9 am -5pm    Monday-Friday 11 am - 9 pm Saturday 9 am - 5 pm     Monday-Sunday 12 noon-8PM [phone]     If you need a medication refill, please contact us you may need lab work and/or a follow up visit prior to your refill (i.e. Antifungal medications).    [employer] (secure e-mail communication and access to your chart) to send a message or to make an appointment.    If MRI needed please call [facility] at [phone]        Weight management plan: Patient was referred to their PCP to discuss a diet and exercise plan.    Patient to follow up with Primary Care provider regarding elevated blood pressure.            Follow-ups after your visit        Who to contact     If you have questions or need follow up information about today's clinic visit or your  schedule please contact [facility] directly at [phone].  Normal or non-critical lab and imaging results will be communicated to you by MyChart, letter or phone within 4 business days after the clinic has received the results. If you do not hear from us within 7 days, please contact the clinic through [employer] or phone. If you have a critical or abnormal lab result, we will notify you by phone as soon as possible.  Submit refill requests through [employer] or call your pharmacy and they will forward the refill request to us. Please allow 3 business days for your refill to be completed.          Additional Information About Your Visit        [employer] Information     [employer] gives you secure access to your electronic health record. If you see a primary care provider, you can also send messages to your care team and make appointments. If you have questions, please call your primary care clinic.  If you do not have a primary care provider, please call [phone] and they will assist you.        Care EveryWhere ID     This is your Care EveryWhere ID. This could be used by other organizations to access your [place] medical records  [id number]        Your Vitals Were     BMI (Body Mass Index)                   27.46 kg/m2            Blood Pressure from Last 3 Encounters:   11/27/18 136/54   11/20/18 130/70   03/06/18 114/56    Weight from Last 3 Encounters:   11/27/18 155 lb (70.3 kg)   11/20/18 155 lb (70.3 kg)   03/06/18 142 lb (64.4 kg)              We Performed the Following     REMOVAL NAIL/NAIL BED, PARTIAL OR COMPLETE     REMOVAL NAIL/NAIL BED, PARTIAL OR COMPLETE        Primary Care Provider Office Phone # Fax #    Shruthi Osorio PA-C [phone]       [address] [place] [address]        Equal Access to Services     DILCIA HERMAN : Hadii karrie neilo [place], waaxda luqadaha, qaybta kaalmada edmund, ede diego. So  [facility] [phone].    ATENCIÓN: Si habla amanda, tiene a perez disposición servicios gratuitos de asistencia lingüística. Kaden al [phone].    We comply with applicable federal civil rights laws and [place] laws. We do not discriminate on the basis of race, color, national origin, age, disability, sex, sexual orientation, or gender identity.            Thank you!     Thank you for choosing [facility]  for your care. Our goal is always to provide you with excellent care. Hearing back from our patients is one way we can continue to improve our services. Please take a few minutes to complete the written survey that you may receive in the mail after your visit with us. Thank you!             Your Updated Medication List - Protect others around you: Learn how to safely use, store and throw away your medicines at www.disposemymeds.org.          This list is accurate as of 11/27/18  4:27 PM.  Always use your most recent med list.                   Brand Name Dispense Instructions for use Diagnosis    LEVOTHYROXINE SODIUM PO

## 2018-11-27 NOTE — LETTER
11/27/2018         RE: Muriel Porras  [address]  [place] [address]        Dear Colleague,    Thank you for referring your patient, Muriel Porras, to the [facility]. Please see a copy of my visit note below.    Subjective:    Pt is seen today for ingrown great toenail.  Points to right and left first toe both border(s).   Has had a temporary in the past.  Would like a permanent today.  Denies F/C/N/V.      ROS:  No hx of wound healing problems, or numbness       Allergies   Allergen Reactions     Aspirin      Mouth lesions        Current Outpatient Prescriptions   Medication Sig Dispense Refill     LEVOTHYROXINE SODIUM PO          Patient Active Problem List   Diagnosis     Hypothyroidism due to acquired atrophy of thyroid       Past Medical History:   Diagnosis Date     Thyroid disease        No past surgical history on file.    Family History   Problem Relation Age of Onset     Family history unknown: Yes       Social History   Substance Use Topics     Smoking status: Former Smoker     Packs/day: 1.00     Types: Cigarettes     Quit date: 4/1/2016     Smokeless tobacco: Never Used     Alcohol use 0.0 oz/week     0 Standard drinks or equivalent per week      Comment: Occasional          Objective:    /54  Wt 155 lb (70.3 kg)  BMI 27.46 kg/m2  Pulses are palpable +2/4 DP & PT bilateral.  Sensation to light touch is intact.  No fore foot deformities are noted.  Normal ROM all forefoot joints.  No evidence of deep abscess or infection noted.  Pain on palpation of right and left first toe both borders.  Erythema, edema, and some proud tissue noted.  Nail appears to be incurvated on the affected border.     Assessment:  Onychocryptosis with paronychia right and left first toe both border(s)    Plan:  Discussed etiology and treatment options with the pt.  The potential causes and nature of an ingrown toenail were discussed with the patient.  We reviewed the natural  history/prognosis of the condition and potential risks if no treatment is provided.      Treatment options discussed included conservative management (oral antibiotics, soaking of foot, adequate width shoes)  as well as surgical management (partial or total nail removal).  The pros and cons of both forms of treatment were reviewed.  Handout dispensed.       After thorough discussion and answering all questions, the patient elected to have permanent removal of right and left first toe both borders.  Risk complications and efficacy discussed with patient.  Obtained consent, used 3cc of 1% lidocaine plain to block aformentioned toe(s).  Sterile prep, then avulsed right and left first toe both border(s).  No evidence of deep abscess noted.  Phenol was applied times 3 at 30 second intervals with curettage in between and then alcohol rinse.  Pt tolerated procedure well.  Sterile bandage placed, gave wound care instruction.  Return to clinic prn.    Jose Anaya DPM, FACFAS           Again, thank you for allowing me to participate in the care of your patient.        Sincerely,        [facility] Anaya DPM

## 2018-11-27 NOTE — PROGRESS NOTES
Subjective:    Pt is seen today for ingrown great toenail.  Points to right and left first toe both border(s).   Has had a temporary in the past.  Would like a permanent today.  Denies F/C/N/V.      ROS:  No hx of wound healing problems, or numbness       Allergies   Allergen Reactions     Aspirin      Mouth lesions        Current Outpatient Prescriptions   Medication Sig Dispense Refill     LEVOTHYROXINE SODIUM PO          Patient Active Problem List   Diagnosis     Hypothyroidism due to acquired atrophy of thyroid       Past Medical History:   Diagnosis Date     Thyroid disease        No past surgical history on file.    Family History   Problem Relation Age of Onset     Family history unknown: Yes       Social History   Substance Use Topics     Smoking status: Former Smoker     Packs/day: 1.00     Types: Cigarettes     Quit date: 4/1/2016     Smokeless tobacco: Never Used     Alcohol use 0.0 oz/week     0 Standard drinks or equivalent per week      Comment: Occasional          Objective:    /54  Wt 155 lb (70.3 kg)  BMI 27.46 kg/m2  Pulses are palpable +2/4 DP & PT bilateral.  Sensation to light touch is intact.  No fore foot deformities are noted.  Normal ROM all forefoot joints.  No evidence of deep abscess or infection noted.  Pain on palpation of right and left first toe both borders.  Erythema, edema, and some proud tissue noted.  Nail appears to be incurvated on the affected border.     Assessment:  Onychocryptosis with paronychia right and left first toe both border(s)    Plan:  Discussed etiology and treatment options with the pt.  The potential causes and nature of an ingrown toenail were discussed with the patient.  We reviewed the natural history/prognosis of the condition and potential risks if no treatment is provided.      Treatment options discussed included conservative management (oral antibiotics, soaking of foot, adequate width shoes)  as well as surgical management (partial or total  nail removal).  The pros and cons of both forms of treatment were reviewed.  Handout dispensed.       After thorough discussion and answering all questions, the patient elected to have permanent removal of right and left first toe both borders.  Risk complications and efficacy discussed with patient.  Obtained consent, used 3cc of 1% lidocaine plain to block aformentioned toe(s).  Sterile prep, then avulsed right and left first toe both border(s).  No evidence of deep abscess noted.  Phenol was applied times 3 at 30 second intervals with curettage in between and then alcohol rinse.  Pt tolerated procedure well.  Sterile bandage placed, gave wound care instruction.  Return to clinic prn.    Jose Anaya DPM, FACFAS

## 2018-11-27 NOTE — PATIENT INSTRUCTIONS
We wish you continued good healing. If you have any questions or concerns, please do not hesitate to contact us at [phone]    Please remember to call and schedule a follow up appointment if one was recommended at your earliest convenience.   PODIATRY CLINIC HOURS  TELEPHONE NUMBER    Dr. [place] Anaya D.P.M [facility]    Clinics:  [facility]    Karly Morris [facility]   Tuesday 1PM-6PM  [place]  Wednesday 7AM-2PM  [facility]  Thursday 10AM-6PM  [place]  Friday 7AM-3PM  [place]  Specialty schedulers:   [phone] to make an appointment with any Specialty Provider.        Urgent Care locations:    [facility] Monday-Friday 5 pm - 9 pm. Saturday-Sunday 9 am -5pm    Monday-Friday 11 am - 9 pm Saturday 9 am - 5 pm     Monday-Sunday 12 noon-8PM [phone]     If you need a medication refill, please contact us you may need lab work and/or a follow up visit prior to your refill (i.e. Antifungal medications).    [employer] (secure e-mail communication and access to your chart) to send a message or to make an appointment.    If MRI needed please call [facility] at [phone]        Weight management plan: Patient was referred to their PCP to discuss a diet and exercise plan.    Patient to follow up with Primary Care provider regarding elevated blood pressure.

## 2019-05-02 ENCOUNTER — OFFICE VISIT (OUTPATIENT)
Dept: URGENT CARE | Facility: URGENT CARE | Age: 59
End: 2019-05-02
Payer: COMMERCIAL

## 2019-05-02 ENCOUNTER — ANCILLARY PROCEDURE (OUTPATIENT)
Dept: GENERAL RADIOLOGY | Facility: CLINIC | Age: 59
End: 2019-05-02
Attending: FAMILY MEDICINE
Payer: COMMERCIAL

## 2019-05-02 VITALS
BODY MASS INDEX: 27.46 KG/M2 | TEMPERATURE: 98.7 F | HEART RATE: 78 BPM | WEIGHT: 155 LBS | DIASTOLIC BLOOD PRESSURE: 77 MMHG | OXYGEN SATURATION: 99 % | SYSTOLIC BLOOD PRESSURE: 127 MMHG

## 2019-05-02 DIAGNOSIS — R05.8 COUGH PRESENT FOR GREATER THAN 3 WEEKS: ICD-10-CM

## 2019-05-02 DIAGNOSIS — R51.9 NEW ONSET OF HEADACHES AFTER AGE 50: ICD-10-CM

## 2019-05-02 DIAGNOSIS — H66.001 ACUTE SUPPURATIVE OTITIS MEDIA OF RIGHT EAR WITHOUT SPONTANEOUS RUPTURE OF TYMPANIC MEMBRANE, RECURRENCE NOT SPECIFIED: Primary | ICD-10-CM

## 2019-05-02 DIAGNOSIS — H65.92 LEFT SEROUS OTITIS MEDIA, UNSPECIFIED CHRONICITY: ICD-10-CM

## 2019-05-02 PROCEDURE — 71046 X-RAY EXAM CHEST 2 VIEWS: CPT

## 2019-05-02 PROCEDURE — 99214 OFFICE O/P EST MOD 30 MIN: CPT | Performed by: FAMILY MEDICINE

## 2019-05-02 NOTE — PROGRESS NOTES
"Chief complaint: headache and dizzy  Accompanied by     Woke up at 4 am got up to go to the bathroom. Took a couple of steps and felt dizzy like she was falling and caught herself on the wall.  She needed to hold on to the wall.  She was able to go to the bathroom and laid down    Patient tried to go to work but dizziness was really bothering her    Feels like the inside of her head is spinning    This is the worst it's been - in the past has had episode 4-6 times the past year. But not this bad doesn't even last a whole day    3 months ago patient was at work and was just sitting down had an episode of \"blurring of vision\" was seeing things wavy and moving and black dots like if you might have stood up too fast which lasted about 10 minutes then had a terrible headache     Since then has had a headache off and on     No tinnitus no hearing loss  Chest pain or palpitation: No  Syncope or presyncope: No  Motor,sensory weakness, or slurring of speech: No  Headache: mild to moderate   Blurring of vision : No  Nausea: YES  Vomiting: No  Abdominal pain: No  Recent trauma: No    No thoughts of harming self or others   Feels safe at home     Tried supportive treatment  At home no relief  Additional Information: none     Problem list and histories reviewed & adjusted, as indicated.  Additional history: as documented    Problem list, Medication list, Allergies, and Medical/Social/Surgical histories reviewed in [facility] and updated as appropriate.    ROS:  Constitutional, HEENT, cardiovascular, pulmonary, gi and gu systems are negative, except as otherwise noted.    OBJECTIVE:                                                    /77   Pulse 78   Temp 98.7  F (37.1  C) (Oral)   Wt 70.3 kg (155 lb)   SpO2 99%   Breastfeeding? No   BMI 27.46 kg/m    Body mass index is 27.46 kg/m .  GENERAL: healthy, alert and no distress  EYES: Eyes grossly normal to inspection, PERRL and conjunctivae and sclerae normal  HENT: ear " canals normal, nose and mouth without ulcers or lesions  Bilateral nose congestion  Left tympaninc membrane eyrhtematous and dull  Right tympaninc membrane erythematous with some mild yellowish effusion  NECK: no adenopathy, no asymmetry, masses, or scars and thyroid normal to palpation  RESP: lungs clear to auscultation - no rales, rhonchi or wheezes  CV: regular rate and rhythm, normal S1 S2, no S3 or S4, no murmur, click or rub, no peripheral edema and peripheral pulses strong  ABDOMEN: soft, nontender, no hepatosplenomegaly, no masses and bowel sounds normal  MS: no gross musculoskeletal defects noted, no edema  SKIN: no suspicious lesions or rashes  NEURO: Normal strength and tone, mentation intact and speech normal  PSYCH: mentation appears normal, affect normal/bright    Diagnostic Test Results:  Results for orders placed or performed in visit on 05/02/19 (from the past 24 hour(s))   XR Chest 2 Views    Narrative    XR CHEST 2 VW 5/2/2019 5:46 PM    HISTORY: Cough.     COMPARISON: None.      Impression    IMPRESSION: The lungs are clear. No focal pulmonary opacities. Heart  and mediastinum are unremarkable. No acute cardiopulmonary  abnormalities.    NELDA BALTAZAR MD        ASSESSMENT/PLAN:                                                        ICD-10-CM    1. Acute suppurative otitis media of right ear without spontaneous rupture of tympanic membrane, recurrence not specified H66.001 amoxicillin-clavulanate (AUGMENTIN) 875-125 MG tablet   2. Left serous otitis media, unspecified chronicity H65.92 amoxicillin-clavulanate (AUGMENTIN) 875-125 MG tablet   3. Cough present for greater than 3 weeks R05 XR Chest 2 Views   4. New onset of headaches after age 49 R51        Dizziness is likely vertigionous possible BPPV   See patient instructions.  Signs and symptoms of worsening dizziness discussed. Alarm symptoms of possible CVA or cardiac events discussed. If with any of these advised to go to ER.    Patient with  sinus symptoms acute on chronic along with ear infection  Prescribed with augmentin  Side effects discussed warned about GI side effects and risk of cdiff.    Chest xray normal   Worsening bilateral headache past few months - recommend primary care provider as this has been going on now - may need an mri . Defer to primary care provider  Alarm signs or symptoms discussed, if present recommend go to ER   Per patient pain is mild currently  No thoughts of harming self or others    No driving and avoid being on any unprotected heights until dizziness is resolved.    Recommend follow up with primary care provider for re-evaluation and to address all above.  sooner if worse  Adverse reactions of medications discussed.  Over the counter medications discussed.   Aware to come back in if with worsening symptoms or if no relief despite treatment plan  Patient voiced understanding and had no further questions.     MD Marisa Muse MD  [facility]

## 2019-05-02 NOTE — PATIENT INSTRUCTIONS
Patient Education     Dizziness (Vertigo) and Balance Problems: Staying Safe     Replace burned-out light bulbs to keep your home safe and well lit.     Falls or accidents can lead to pain, broken bones, and fear of future falls. Protect yourself and others by preparing for episodes. Simple steps can help you stay safe at home and wherever you go.  Lighting  Keep all areas well lit. This helps your eyes send the right signals to the brain. It also makes you less likely to trip and fall. If bright lights make symptoms worse, dim the lights or lie in a dark room until the dizziness passes. Then turn the lights back to their normal level.  Tips:    Keep a flashlight by the bed.    Place nightlights in bathrooms and hallways.    Replace burned-out bulbs, or have someone replace them for you.  Preventing falls  To reduce your risk of falling:    Get out of bed or up from a chair slowly.    Wear low-heeled shoes that fit properly and have slip-resistant soles.    Remove throw rugs. Clear clutter from walkways.    Use handrails on stairs. Have handrails installed or adjusted if needed.    Install grab bars in the bathroom. Don't use towel racks for balance.    Use a shower stool. Also put adhesive strips in the shower or on the tub floor.  Going out  With a little time and preparation, you can get around safely.  Tips:    Bring a cane or walking aid if needed.    Give yourself plenty of time in case you start to get dizzy.    Ask your healthcare provider what type of exercise is safe for your condition.    Be patient. If an activity such as walking through a crowded shop causes you stress, you may not be ready for it yet.  Driving  If you become dizzy or disoriented while driving, you could hurt yourself and others. That's why it's best to not drive until symptoms have gone away. In some cases, your license may be temporarily held until it's safe for you to drive again.  For safety:    Ask a friend to drive for  you.    Take public transportation.    Walk to stores and other places when you can.  Asking for help  Don't be afraid to ask for help running errands, cooking meals, and doing exercise. Whether it's a friend, loved one, neighbor, or stranger on the street, a little help can make a world of difference.   Date Last Reviewed: 11/1/2016 2000-2018 [employer]. [address] [place] [address], [place] [address]. All rights reserved. This information is not intended as a substitute for professional medical care. Always follow your healthcare professional's instructions.

## 2019-05-08 ENCOUNTER — OFFICE VISIT (OUTPATIENT)
Dept: FAMILY MEDICINE | Facility: CLINIC | Age: 59
End: 2019-05-08
Payer: COMMERCIAL

## 2019-05-08 VITALS
TEMPERATURE: 96.9 F | BODY MASS INDEX: 28.7 KG/M2 | OXYGEN SATURATION: 97 % | DIASTOLIC BLOOD PRESSURE: 74 MMHG | RESPIRATION RATE: 18 BRPM | SYSTOLIC BLOOD PRESSURE: 131 MMHG | WEIGHT: 162 LBS | HEART RATE: 73 BPM

## 2019-05-08 DIAGNOSIS — R00.2 PALPITATIONS: Primary | ICD-10-CM

## 2019-05-08 DIAGNOSIS — R51.9 NONINTRACTABLE EPISODIC HEADACHE, UNSPECIFIED HEADACHE TYPE: ICD-10-CM

## 2019-05-08 DIAGNOSIS — H81.10 BENIGN PAROXYSMAL POSITIONAL VERTIGO, UNSPECIFIED LATERALITY: ICD-10-CM

## 2019-05-08 PROCEDURE — 93000 ELECTROCARDIOGRAM COMPLETE: CPT | Performed by: PHYSICIAN ASSISTANT

## 2019-05-08 PROCEDURE — 99214 OFFICE O/P EST MOD 30 MIN: CPT | Performed by: PHYSICIAN ASSISTANT

## 2019-05-08 NOTE — PROGRESS NOTES
SUBJECTIVE:   Muriel Porras is a 58 year old female who presents to clinic today for the following   health issues:        ED/UC Followup:    Facility:  [facility]  Date of visit: 05/02/2019  Reason for visit: Headache and dizzy  Current Status: Only feels dizziness with fast movements, still has daily headaches     Seen on 5/2/19 in UC  Dx with AOM and sinusitis  Started on Augmentin    3-4 months ago had an episode where she had trouble reading her computer - vision was fuzzy/moving  Episode lasted 5 mins  Had severe headache after this vision change  Seems to have a dull headache more often than not ever since    Intermittent dizziness - feels like she's spinning  No neuro changes: gait changes, weakness, trouble word finding, etc   Dizziness occurs when moving, 30-90s  Subsides when she focuses on something         PROBLEMS TO ADD ON...  None  -------------------------------------    Additional history: as documented    Reviewed  and updated as needed this visit by clinical staff         Reviewed and updated as needed this visit by Provider         Patient Active Problem List   Diagnosis     Hypothyroidism due to acquired atrophy of thyroid     No past surgical history on file.    Social History     Tobacco Use     Smoking status: Former Smoker     Packs/day: 1.00     Types: Cigarettes     Last attempt to quit: 4/1/2016     Years since quitting: 3.1     Smokeless tobacco: Never Used   Substance Use Topics     Alcohol use: Yes     Alcohol/week: 0.0 oz     Comment: Occasional      Family History   Family history unknown: Yes           ROS:  Constitutional, neuro, ENT, cardiac, musculoskeletal, and psychiatric systems are negative, except as otherwise noted.    OBJECTIVE:                                                    /74   Pulse 73   Temp 96.9  F (36.1  C) (Tympanic)   Resp 18   Wt 73.5 kg (162 lb)   SpO2 97%   BMI 28.70 kg/m    Body mass index is 28.7 kg/m .  GENERAL APPEARANCE: healthy, alert  and no distress  EYES: Eyes grossly normal to inspection and conjunctivae and sclerae normal  HENT: ear canals and TM's normal  RESP: lungs clear to auscultation - no rales, rhonchi or wheezes  CV: regular rates and rhythm, normal S1 S2, no S3 or S4, no murmur, click or rub and frequent extasystoles with compensatory pause  MS: extremities normal- no gross deformities noted  NEURO: Normal strength and tone, mentation intact, speech normal and nystagmus not noted  PSYCH: mentation appears normal and affect normal/bright    Diagnostic test results:  Diagnostic Test Results:  none   EKG - appears normal, NSR, normal axis, normal intervals, no acute ST/T changes c/w ischemia, no LVH by voltage criteria, Premature Atrial Contractions (PAC) noted     ASSESSMENT:                                                      1. Palpitations    2. Benign paroxysmal positional vertigo, unspecified laterality    3. Nonintractable episodic headache, unspecified headache type         PLAN:                                                    1) EKG shows 2 PACs - etiology of premature contractions discussed. No need to initiate treatment currently. Follow up if symptoms fail to improve or worsen.     2) Likely BPPV. Etiology and treatments discussed. If this occurs again she can follow up with physical therapy for further evaluation. Otherwise, Meclizine PRN until episode resolves.     3) She describes a migraine with aura. We talked about common causes of headaches. She'll work at improving upon the below things. Follow up if symptoms fail to improve or worsen.     Her AOM/sinusitis has improved. Continue and finish Augmentin.    Patient Instructions     Common causes of headaches:  1. Stress/anxiety  2. Dehydration - at least 60+ ounces of water per day recommended  3. Poor posture or tension in the neck/shoulders  4. Poor quality sleep or lack of sleep  5. Sinus congestion/allergies     James BUENROSTRO is one of our physical therapists that  treats vertigo.  Meclizine is used to treat symptoms during an episode.      Patient Education     Benign Paroxysmal Positional Vertigo    Benign paroxysmal positional vertigo (BPPV) is a problem with the inner ear. The inner ear contains the vestibular system. This system is what helps you keep your balance. BPPV causes a feeling of spinning. It is a common problem of the vestibular system.  Understanding the vestibular system  The vestibular system of the ear is made up of very tiny parts. They include the utricle, saccule, and semicircular canals. The utricle is a tiny organ that contains calcium crystals. In some people, the crystals can move into the semicircular canals. When this happens, the system no longer works as it should. This causes BPPV. Benign means it is not life threatening. Paroxysmal means it happens suddenly. Positional means that it happens when you move your head. Vertigo is a feeling of spinning.  What causes BPPV?  Causes include injury to your head or neck. Other problems with the vestibular system may cause BPPV. In many people, the cause of BPPV is not known.  Symptoms of BPPV  You many have repeated feelings of spinning (vertigo). The vertigo usually lasts less than 1 minute. Some movements, such as rolling over in bed, can bring on vertigo.  Diagnosing BPPV  Your primary healthcare provider may diagnose and treat your BPPV. Or you may see an ear, nose, and throat doctor (otolaryngologist). In some cases, you may see a nervous system doctor (neurologist).  The healthcare provider will ask about your symptoms and your medical history. He or she will examine you. You may have hearing and balance tests. As part of the exam, your healthcare provider may have you move your head and body in certain ways. If you have BPPV, the movements can bring on vertigo. Your provider will also look for abnormal movements of your eyes. You may have other tests to check your vestibular or nervous  systems.  Treatment for BPPV  Your healthcare provider may try to move the calcium crystals. This is done by having you move your head and neck in certain ways. This treatment is safe and often works well. You may also be told to do these movements at home. You may still have vertigo for a few weeks. Your healthcare provider will recheck your symptoms, usually in about a month. Special physical therapy may also be part of treatment. In rare cases, surgery may be needed for BPPV that does not go away.     When to call the healthcare provider  Call your healthcare provider right away if you have any of these:    Symptoms that do not go away with treatment    Symptoms that get worse    New symptoms   Date Last Reviewed: 5/1/2017 2000-2018 [employer]. [address] [place] [address], [place] [address]. All rights reserved. This information is not intended as a substitute for professional medical care. Always follow your healthcare professional's instructions.                Shruthi Osorio PA-C  [facility]

## 2019-05-08 NOTE — PATIENT INSTRUCTIONS
Common causes of headaches:  1. Stress/anxiety  2. Dehydration - at least 60+ ounces of water per day recommended  3. Poor posture or tension in the neck/shoulders  4. Poor quality sleep or lack of sleep  5. Sinus congestion/allergies     James BUENROSTRO is one of our physical therapists that treats vertigo.  Meclizine is used to treat symptoms during an episode.      Patient Education     Benign Paroxysmal Positional Vertigo    Benign paroxysmal positional vertigo (BPPV) is a problem with the inner ear. The inner ear contains the vestibular system. This system is what helps you keep your balance. BPPV causes a feeling of spinning. It is a common problem of the vestibular system.  Understanding the vestibular system  The vestibular system of the ear is made up of very tiny parts. They include the utricle, saccule, and semicircular canals. The utricle is a tiny organ that contains calcium crystals. In some people, the crystals can move into the semicircular canals. When this happens, the system no longer works as it should. This causes BPPV. Benign means it is not life threatening. Paroxysmal means it happens suddenly. Positional means that it happens when you move your head. Vertigo is a feeling of spinning.  What causes BPPV?  Causes include injury to your head or neck. Other problems with the vestibular system may cause BPPV. In many people, the cause of BPPV is not known.  Symptoms of BPPV  You many have repeated feelings of spinning (vertigo). The vertigo usually lasts less than 1 minute. Some movements, such as rolling over in bed, can bring on vertigo.  Diagnosing BPPV  Your primary healthcare provider may diagnose and treat your BPPV. Or you may see an ear, nose, and throat doctor (otolaryngologist). In some cases, you may see a nervous system doctor (neurologist).  The healthcare provider will ask about your symptoms and your medical history. He or she will examine you. You may have hearing and balance tests. As  part of the exam, your healthcare provider may have you move your head and body in certain ways. If you have BPPV, the movements can bring on vertigo. Your provider will also look for abnormal movements of your eyes. You may have other tests to check your vestibular or nervous systems.  Treatment for BPPV  Your healthcare provider may try to move the calcium crystals. This is done by having you move your head and neck in certain ways. This treatment is safe and often works well. You may also be told to do these movements at home. You may still have vertigo for a few weeks. Your healthcare provider will recheck your symptoms, usually in about a month. Special physical therapy may also be part of treatment. In rare cases, surgery may be needed for BPPV that does not go away.     When to call the healthcare provider  Call your healthcare provider right away if you have any of these:    Symptoms that do not go away with treatment    Symptoms that get worse    New symptoms   Date Last Reviewed: 5/1/2017 2000-2018 [employer]. [address] [place] [address], [place] [address]. All rights reserved. This information is not intended as a substitute for professional medical care. Always follow your healthcare professional's instructions.

## 2019-06-18 ENCOUNTER — OFFICE VISIT (OUTPATIENT)
Dept: FAMILY MEDICINE | Facility: CLINIC | Age: 59
End: 2019-06-18
Payer: COMMERCIAL

## 2019-06-18 VITALS
HEART RATE: 75 BPM | BODY MASS INDEX: 30.36 KG/M2 | DIASTOLIC BLOOD PRESSURE: 70 MMHG | RESPIRATION RATE: 18 BRPM | HEIGHT: 62 IN | SYSTOLIC BLOOD PRESSURE: 144 MMHG | OXYGEN SATURATION: 99 % | TEMPERATURE: 96.3 F | WEIGHT: 165 LBS

## 2019-06-18 DIAGNOSIS — Z11.59 ENCOUNTER FOR HEPATITIS C SCREENING TEST FOR LOW RISK PATIENT: ICD-10-CM

## 2019-06-18 DIAGNOSIS — E03.9 HYPOTHYROIDISM, UNSPECIFIED TYPE: ICD-10-CM

## 2019-06-18 DIAGNOSIS — Z11.4 ENCOUNTER FOR SCREENING FOR HIV: ICD-10-CM

## 2019-06-18 DIAGNOSIS — Z00.01 ENCOUNTER FOR ROUTINE ADULT MEDICAL EXAM WITH ABNORMAL FINDINGS: Primary | ICD-10-CM

## 2019-06-18 DIAGNOSIS — Z12.11 COLON CANCER SCREENING: ICD-10-CM

## 2019-06-18 DIAGNOSIS — Z13.220 SCREENING FOR HYPERLIPIDEMIA: ICD-10-CM

## 2019-06-18 DIAGNOSIS — Z78.0 POSTMENOPAUSAL STATUS: ICD-10-CM

## 2019-06-18 LAB
CHOLEST SERPL-MCNC: 156 MG/DL
GLUCOSE SERPL-MCNC: 98 MG/DL (ref 70–99)
HCV AB SERPL QL IA: NONREACTIVE
HDLC SERPL-MCNC: 71 MG/DL
HIV 1+2 AB+HIV1 P24 AG SERPL QL IA: NONREACTIVE
LDLC SERPL CALC-MCNC: 60 MG/DL
NONHDLC SERPL-MCNC: 85 MG/DL
T4 FREE SERPL-MCNC: 3.8 NG/DL (ref 0.76–1.46)
TRIGL SERPL-MCNC: 124 MG/DL
TSH SERPL DL<=0.005 MIU/L-ACNC: <0.01 MU/L (ref 0.4–4)

## 2019-06-18 PROCEDURE — 82947 ASSAY GLUCOSE BLOOD QUANT: CPT | Performed by: PHYSICIAN ASSISTANT

## 2019-06-18 PROCEDURE — 87389 HIV-1 AG W/HIV-1&-2 AB AG IA: CPT | Performed by: PHYSICIAN ASSISTANT

## 2019-06-18 PROCEDURE — 36415 COLL VENOUS BLD VENIPUNCTURE: CPT | Performed by: PHYSICIAN ASSISTANT

## 2019-06-18 PROCEDURE — 99396 PREV VISIT EST AGE 40-64: CPT | Performed by: PHYSICIAN ASSISTANT

## 2019-06-18 PROCEDURE — 87624 HPV HI-RISK TYP POOLED RSLT: CPT | Performed by: PHYSICIAN ASSISTANT

## 2019-06-18 PROCEDURE — 84443 ASSAY THYROID STIM HORMONE: CPT | Performed by: PHYSICIAN ASSISTANT

## 2019-06-18 PROCEDURE — 84439 ASSAY OF FREE THYROXINE: CPT | Performed by: PHYSICIAN ASSISTANT

## 2019-06-18 PROCEDURE — G0145 SCR C/V CYTO,THINLAYER,RESCR: HCPCS | Performed by: PHYSICIAN ASSISTANT

## 2019-06-18 PROCEDURE — 80061 LIPID PANEL: CPT | Performed by: PHYSICIAN ASSISTANT

## 2019-06-18 PROCEDURE — 86803 HEPATITIS C AB TEST: CPT | Performed by: PHYSICIAN ASSISTANT

## 2019-06-18 RX ORDER — LEVOTHYROXINE SODIUM 200 UG/1
200 TABLET ORAL DAILY
Qty: 90 TABLET | Refills: 3 | COMMUNITY
Start: 2019-06-18 | End: 2020-10-05

## 2019-06-18 ASSESSMENT — MIFFLIN-ST. JEOR: SCORE: 1268.69

## 2019-06-18 NOTE — PROGRESS NOTES
SUBJECTIVE:   CC: Muriel Porras is an 59 year old woman who presents for preventive health visit.     Healthy Habits:    Do you get at least three servings of calcium containing foods daily (dairy, green leafy vegetables, etc.)? yes    Amount of exercise or daily activities, outside of work: 2 day(s) per week    Problems taking medications regularly No    Medication side effects: No    Have you had an eye exam in the past two years? yes    Do you see a dentist twice per year? no    Do you have sleep apnea, excessive snoring or daytime drowsiness?yes-daytime drowsiness      PROBLEMS TO ADD ON...  Patient informed that anything we discuss that is not related to preventative medicine, may be billed for; patient verbalizes understanding.    Fasting   -------------------------------------    Today's PHQ-2 Score:   PHQ-2 ( 1999 [employer]) 6/18/2019 4/28/2016   Q1: Little interest or pleasure in doing things 0 0   Q2: Feeling down, depressed or hopeless 0 0   PHQ-2 Score 0 0       Abuse: Current or Past(Physical, Sexual or Emotional)- No  Do you feel safe in your environment? Yes    Social History     Tobacco Use     Smoking status: Former Smoker     Packs/day: 1.00     Types: Cigarettes     Last attempt to quit: 4/1/2016     Years since quitting: 3.2     Smokeless tobacco: Never Used   Substance Use Topics     Alcohol use: Yes     Alcohol/week: 0.0 oz     Comment: Occasional      If you drink alcohol do you typically have >3 drinks per day or >7 drinks per week? No                     Reviewed orders with patient.  Reviewed health maintenance and updated orders accordingly - Yes  Lab work is in process  Labs reviewed in EPIC    Mammogram Screening: Patient over age 50, mutual decision to screen reflected in health maintenance.    Pertinent mammograms are reviewed under the imaging tab.  History of abnormal Pap smear: NO - age 30-65 PAP every 5 years with negative HPV co-testing recommended     Reviewed and updated as  "needed this visit by clinical staff  Tobacco  Allergies  Meds         Reviewed and updated as needed this visit by Provider        Past Medical History:   Diagnosis Date     Thyroid disease         ROS:  Other than what is noted in the HPI and PMH a complete review of systems is otherwise negative including: Constitutional, HEENT, endocrine, cardiovascular, respiratory, GI/, musculoskeletal, neuro, and psychiatric.     OBJECTIVE:   /70   Pulse 75   Temp 96.3  F (35.7  C) (Tympanic)   Resp 18   Ht 1.57 m (5' 1.81\")   Wt 74.8 kg (165 lb)   LMP 09/01/2014   SpO2 99%   BMI 30.36 kg/m    EXAM:  GENERAL: healthy, alert and no distress  EYES: Eyes grossly normal to inspection, PERRL and conjunctivae and sclerae normal  HENT: ear canals and TM's normal, nose and mouth without ulcers or lesions  NECK: no adenopathy, no asymmetry, masses, or scars and thyroid normal to palpation  RESP: lungs clear to auscultation - no rales, rhonchi or wheezes  BREAST: normal without masses, tenderness or nipple discharge and no palpable axillary masses or adenopathy  CV: occasional premature beats, normal S1 S2, no S3 or S4 and no murmur, click or rub  ABDOMEN: soft, nontender, no hepatosplenomegaly, no masses and bowel sounds normal   (female): normal female external genitalia, normal urethral meatus, vaginal mucosa pink, moist, well rugated, and normal cervix  MS: no gross musculoskeletal defects noted, no edema  SKIN: no suspicious lesions or rashes  NEURO: Normal strength and tone, mentation intact and speech normal  PSYCH: mentation appears normal, affect normal/bright    ASSESSMENT/PLAN:       ICD-10-CM    1. Encounter for routine adult medical exam with abnormal findings Z00.01 GLUCOSE     MA SCREENING DIGITAL BILAT - Future  (s+30)     Pap imaged thin layer screen with HPV - recommended age 30 - 65 years (select HPV order below)     HPV High Risk Types DNA Cervical   2. Screening for hyperlipidemia Z13.220 Lipid " "panel reflex to direct LDL Fasting   3. Encounter for screening for HIV Z11.4 HIV Screening   4. Encounter for hepatitis C screening test for low risk patient Z11.59 Hepatitis C Screen Reflex to HCV RNA Quant and Genotype   5. Colon cancer screening Z12.11 GASTROENTEROLOGY ADULT REF PROCEDURE ONLY   6. Postmenopausal status Z78.0 DX Hip/Pelvis/Spine   7. Hypothyroidism, unspecified type E03.9 TSH with free T4 reflex     levothyroxine (SYNTHROID/LEVOTHROID) 200 MCG tablet       1-6) Screenings discussed    7) Will check her TSH today. Patient gets her levothyroxine from [facility]. Follow up annually      COUNSELING:   Reviewed preventive health counseling, as reflected in patient instructions    Estimated body mass index is 30.36 kg/m  as calculated from the following:    Height as of this encounter: 1.57 m (5' 1.81\").    Weight as of this encounter: 74.8 kg (165 lb).     reports that she quit smoking about 3 years ago. Her smoking use included cigarettes. She smoked 1.00 pack per day. She has never used smokeless tobacco.      Counseling Resources:  ATP IV Guidelines  Pooled Cohorts Equation Calculator  Breast Cancer Risk Calculator  FRAX Risk Assessment  ICSI Preventive Guidelines  Dietary Guidelines for Americans, 2010  [employer]'s MyPlate  ASA Prophylaxis  Lung CA Screening    Shruthi Osorio PA-C  [facility]  "

## 2019-06-19 ENCOUNTER — MYC MEDICAL ADVICE (OUTPATIENT)
Dept: FAMILY MEDICINE | Facility: CLINIC | Age: 59
End: 2019-06-19

## 2019-06-19 NOTE — LETTER
July 1, 2019      Muriel Porras  [address] [facility] [place] [address]        Dear ,    We are writing to inform you of your test results. I have tried to reach you multiple times by phone and my chart and have been unsuccessful.    Your blood sugar is normal - no signs of diabetes.   Your LDL (bad) cholesterol is <160 and therefore at goal - no signs of high cholesterol. It is slightly higher than we would like, but not to the level where medication is needed. Work on diet, exercise and weight if possible; this can all help to improve cholesterol.   The HIV and Hepatitis C screens are negative.   Your T4 hormone is above normal range - this could be the cause of your palpitations/premature heart contractions. I recommend decreasing your levothyroxine to 175mcg daily and rechecking your levels in 2-3 months.     If you have any questions or concerns, please call the clinic at the number listed above.       Sincerely,        Shruthi Osorio PA-C/yulisao

## 2019-06-20 LAB
COPATH REPORT: NORMAL
PAP: NORMAL

## 2019-06-21 LAB
FINAL DIAGNOSIS: NORMAL
HPV HR 12 DNA CVX QL NAA+PROBE: NEGATIVE
HPV16 DNA SPEC QL NAA+PROBE: NEGATIVE
HPV18 DNA SPEC QL NAA+PROBE: NEGATIVE
SPECIMEN DESCRIPTION: NORMAL
SPECIMEN SOURCE CVX/VAG CYTO: NORMAL

## 2019-06-26 NOTE — TELEPHONE ENCOUNTER
Attempt #1:  Message left on VM to return call to clinic at [phone] or [phone].    Adelaida Arriaga RN BSN PHN

## 2019-06-27 NOTE — TELEPHONE ENCOUNTER
2nd attempt  Left message on voice mail for patient to call clinic  Or review via my chart. [phone].  Trina Sanders RN

## 2019-06-28 NOTE — TELEPHONE ENCOUNTER
RN's have reached out x3 with no return phone calls.   [employer] message still not read by patient.     Routing to provider to advise.   Adelaida Arriaga, RN, BSN, PHN

## 2019-06-28 NOTE — TELEPHONE ENCOUNTER
3rd Attempt.  Left message on voice mail for patient to call clinic or review via my chart. [phone].  Trina Sanders RN

## 2019-07-08 ENCOUNTER — ANCILLARY PROCEDURE (OUTPATIENT)
Dept: BONE DENSITY | Facility: CLINIC | Age: 59
End: 2019-07-08
Attending: PHYSICIAN ASSISTANT
Payer: COMMERCIAL

## 2019-07-08 ENCOUNTER — ANCILLARY PROCEDURE (OUTPATIENT)
Dept: MAMMOGRAPHY | Facility: CLINIC | Age: 59
End: 2019-07-08
Attending: PHYSICIAN ASSISTANT
Payer: COMMERCIAL

## 2019-07-08 DIAGNOSIS — Z12.31 VISIT FOR SCREENING MAMMOGRAM: ICD-10-CM

## 2019-07-08 DIAGNOSIS — Z78.0 POSTMENOPAUSAL STATUS: ICD-10-CM

## 2019-07-08 PROCEDURE — 77067 SCR MAMMO BI INCL CAD: CPT | Mod: TC

## 2019-07-08 PROCEDURE — 77080 DXA BONE DENSITY AXIAL: CPT | Performed by: INTERNAL MEDICINE

## 2019-07-15 PROBLEM — M85.852 OSTEOPENIA OF BOTH HIPS: Status: ACTIVE | Noted: 2019-07-15

## 2019-07-15 PROBLEM — M85.851 OSTEOPENIA OF BOTH HIPS: Status: ACTIVE | Noted: 2019-07-15

## 2019-09-13 ENCOUNTER — HOSPITAL ENCOUNTER (OUTPATIENT)
Facility: AMBULATORY SURGERY CENTER | Age: 59
Discharge: HOME | End: 2019-09-13
Attending: SURGERY | Admitting: SURGERY
Payer: COMMERCIAL

## 2019-09-13 VITALS
OXYGEN SATURATION: 96 % | TEMPERATURE: 97.4 F | DIASTOLIC BLOOD PRESSURE: 60 MMHG | SYSTOLIC BLOOD PRESSURE: 118 MMHG | HEART RATE: 58 BPM | RESPIRATION RATE: 16 BRPM

## 2019-09-13 LAB — COLONOSCOPY: NORMAL

## 2019-09-13 PROCEDURE — 99152 MOD SED SAME PHYS/QHP 5/>YRS: CPT | Mod: 59 | Performed by: SURGERY

## 2019-09-13 PROCEDURE — G0121 COLON CA SCRN NOT HI RSK IND: HCPCS | Performed by: SURGERY

## 2019-09-13 PROCEDURE — G8918 PT W/O PREOP ORDER IV AB PRO: HCPCS

## 2019-09-13 PROCEDURE — 45378 DIAGNOSTIC COLONOSCOPY: CPT

## 2019-09-13 PROCEDURE — G8907 PT DOC NO EVENTS ON DISCHARG: HCPCS

## 2019-09-13 RX ORDER — ONDANSETRON 2 MG/ML
4 INJECTION INTRAMUSCULAR; INTRAVENOUS EVERY 6 HOURS PRN
Status: CANCELLED | OUTPATIENT
Start: 2019-09-13

## 2019-09-13 RX ORDER — ONDANSETRON 2 MG/ML
4 INJECTION INTRAMUSCULAR; INTRAVENOUS
Status: DISCONTINUED | OUTPATIENT
Start: 2019-09-13 | End: 2019-09-14 | Stop reason: HOSPADM

## 2019-09-13 RX ORDER — LIDOCAINE 40 MG/G
CREAM TOPICAL
Status: DISCONTINUED | OUTPATIENT
Start: 2019-09-13 | End: 2019-09-14 | Stop reason: HOSPADM

## 2019-09-13 RX ORDER — FLUMAZENIL 0.1 MG/ML
0.2 INJECTION, SOLUTION INTRAVENOUS
Status: CANCELLED | OUTPATIENT
Start: 2019-09-13 | End: 2019-09-13

## 2019-09-13 RX ORDER — NALOXONE HYDROCHLORIDE 0.4 MG/ML
.1-.4 INJECTION, SOLUTION INTRAMUSCULAR; INTRAVENOUS; SUBCUTANEOUS
Status: CANCELLED | OUTPATIENT
Start: 2019-09-13 | End: 2019-09-14

## 2019-09-13 RX ORDER — FENTANYL CITRATE 50 UG/ML
INJECTION, SOLUTION INTRAMUSCULAR; INTRAVENOUS PRN
Status: DISCONTINUED | OUTPATIENT
Start: 2019-09-13 | End: 2019-09-13 | Stop reason: HOSPADM

## 2019-09-13 RX ORDER — ONDANSETRON 4 MG/1
4 TABLET, ORALLY DISINTEGRATING ORAL EVERY 6 HOURS PRN
Status: CANCELLED | OUTPATIENT
Start: 2019-09-13

## 2019-09-24 NOTE — TELEPHONE ENCOUNTER
PLAN:  Follow up as directed.    Additional Educational Resources:  For additional resources regarding your symptoms, diagnosis, or further health information, please visit the Online Health Resources section in MyChart.   Spoke with patient she would like letter emailed to her own email address. done

## 2020-01-06 ENCOUNTER — ANCILLARY PROCEDURE (OUTPATIENT)
Dept: GENERAL RADIOLOGY | Facility: CLINIC | Age: 60
End: 2020-01-06
Attending: FAMILY MEDICINE
Payer: COMMERCIAL

## 2020-01-06 ENCOUNTER — OFFICE VISIT (OUTPATIENT)
Dept: FAMILY MEDICINE | Facility: CLINIC | Age: 60
End: 2020-01-06
Payer: COMMERCIAL

## 2020-01-06 VITALS
RESPIRATION RATE: 18 BRPM | TEMPERATURE: 98.8 F | HEART RATE: 88 BPM | SYSTOLIC BLOOD PRESSURE: 134 MMHG | DIASTOLIC BLOOD PRESSURE: 74 MMHG | WEIGHT: 166 LBS | BODY MASS INDEX: 30.55 KG/M2 | OXYGEN SATURATION: 97 %

## 2020-01-06 DIAGNOSIS — R05.9 COUGH: ICD-10-CM

## 2020-01-06 DIAGNOSIS — J20.9 ACUTE BRONCHITIS WITH SYMPTOMS > 10 DAYS: Primary | ICD-10-CM

## 2020-01-06 DIAGNOSIS — J01.90 ACUTE SINUSITIS WITH SYMPTOMS > 10 DAYS: ICD-10-CM

## 2020-01-06 DIAGNOSIS — R53.81 MALAISE: ICD-10-CM

## 2020-01-06 LAB
BASOPHILS # BLD AUTO: 0 10E9/L (ref 0–0.2)
BASOPHILS NFR BLD AUTO: 0 %
DIFFERENTIAL METHOD BLD: ABNORMAL
EOSINOPHIL # BLD AUTO: 0 10E9/L (ref 0–0.7)
EOSINOPHIL NFR BLD AUTO: 0 %
ERYTHROCYTE [DISTWIDTH] IN BLOOD BY AUTOMATED COUNT: 12.6 % (ref 10–15)
HCT VFR BLD AUTO: 47 % (ref 35–47)
HGB BLD-MCNC: 16.5 G/DL (ref 11.7–15.7)
LYMPHOCYTES # BLD AUTO: 1.4 10E9/L (ref 0.8–5.3)
MCH RBC QN AUTO: 30.3 PG (ref 26.5–33)
MCHC RBC AUTO-ENTMCNC: 35.1 G/DL (ref 31.5–36.5)
MCV RBC AUTO: 86 FL (ref 78–100)
MONOCYTES # BLD AUTO: 0.4 10E9/L (ref 0–1.3)
NEUTROPHILS # BLD AUTO: 0.9 10E9/L (ref 1.6–8.3)
PLATELET # BLD AUTO: 160 10E9/L (ref 150–450)
PLATELET # BLD EST: ABNORMAL 10*3/UL
RBC # BLD AUTO: 5.45 10E12/L (ref 3.8–5.2)
RBC MORPH BLD: NORMAL
WBC # BLD AUTO: 2.7 10E9/L (ref 4–11)

## 2020-01-06 PROCEDURE — 36415 COLL VENOUS BLD VENIPUNCTURE: CPT | Performed by: FAMILY MEDICINE

## 2020-01-06 PROCEDURE — 85025 COMPLETE CBC W/AUTO DIFF WBC: CPT | Performed by: FAMILY MEDICINE

## 2020-01-06 PROCEDURE — 99214 OFFICE O/P EST MOD 30 MIN: CPT | Performed by: FAMILY MEDICINE

## 2020-01-06 PROCEDURE — 71046 X-RAY EXAM CHEST 2 VIEWS: CPT

## 2020-01-06 RX ORDER — CODEINE PHOSPHATE AND GUAIFENESIN 10; 100 MG/5ML; MG/5ML
1 SOLUTION ORAL EVERY 6 HOURS PRN
Qty: 118 ML | Refills: 0 | Status: SHIPPED | OUTPATIENT
Start: 2020-01-06 | End: 2020-09-30

## 2020-01-06 RX ORDER — DOXYCYCLINE 100 MG/1
100 CAPSULE ORAL 2 TIMES DAILY
Qty: 20 CAPSULE | Refills: 0 | Status: SHIPPED | OUTPATIENT
Start: 2020-01-06 | End: 2020-01-08

## 2020-01-06 NOTE — PATIENT INSTRUCTIONS
Patient Education     Bronchitis, Antibiotic Treatment (Adult)    Bronchitis is an infection of the air passages (bronchial tubes) in your lungs. It often occurs when you have a cold. This illness is contagious during the first few days and is spread through the air by coughing and sneezing, or by direct contact (touching the sick person and then touching your own eyes, nose, or mouth).  Symptoms of bronchitis include cough with mucus (phlegm) and low-grade fever. Bronchitis usually lasts 7 to 14 days. Mild cases can be treated with simple home remedies. More severe infection is treated with an antibiotic.  Home care  Follow these guidelines when caring for yourself at home:    If your symptoms are severe, rest at home for the first 2 to 3 days. When you go back to your usual activities, don't let yourself get too tired.    Don't smoke. Also stay away from secondhand smoke.    You may use over-the-counter medicines to control fever or pain, unless another medicine was prescribed. If you have chronic liver or kidney disease or have ever had a stomach ulcer or gastrointestinal bleeding, talk with your healthcare provider before using these medicines. Also talk to your provider if you are taking medicine to prevent blood clots. Aspirin should never be given to anyone younger than 18 who is ill with a viral infection or fever. It may cause severe liver or brain damage.    Your appetite may be low, so a light diet is fine. Stay well hydrated by drinking 6 to 8 glasses of fluids per day. This includes water, soft drinks, sports drinks, juices, tea, or soup. Extra fluids will help loosen mucus in your nose and lungs.    Over-the-counter cough, cold, and sore-throat medicines will not shorten the length of the illness, but they may be helpful to reduce your symptoms. Don't use decongestants if you have high blood pressure.    Finish all antibiotic medicine. Do this even if you are feeling better after only a few  days.  Follow-up care  Follow up with your healthcare provider, or as advised. If you had an X-ray or ECG (electrocardiogram), a specialist will review it. You will be told of any new test results that may affect your care.  If you are age 65 or older, if you smoke, or if you have a chronic lung disease or condition that affects your immune system, ask your healthcare provider about getting a pneumococcal vaccine and a yearly flu shot (influenza vaccine).  When to seek medical advice  Call your healthcare provider right away if any of these occur:    Fever of 100.4 F (38 C) or higher, or as directed by your healthcare provider    Coughing up more sputum    Weakness, drowsiness, headache, facial pain, ear pain, or a stiff neck  Call 911  Call 911 if any of these occur.    Coughing up blood    Weakness, drowsiness, headache, or stiff neck that get worse    Trouble breathing, wheezing, or pain with breathing  Date Last Reviewed: 6/1/2018 2000-2019 [employer]. [address] [place] [address]. All rights reserved. This information is not intended as a substitute for professional medical care. Always follow your healthcare professional's instructions.           Patient Education     Sinusitis (Antibiotic Treatment)    The sinuses are air-filled spaces within the bones of the face. They connect to the inside of the nose. Sinusitis is an inflammation of the tissue that lines the sinuses. Sinusitis can occur during a cold. It can also happen due to allergies to pollens and other particles in the air. Sinusitis can cause symptoms of sinus congestion and a feeling of fullness. A sinus infection causes fever, headache, and facial pain. There is often green or yellow fluid draining from the nose or into the back of the throat (post-nasal drip). You have been given antibiotics to treat this condition.  Home care    Take the full course of antibiotics as instructed. Do not stop taking them, even when  you feel better.    Drink plenty of water, hot tea, and other liquids. This may help thin nasal mucus. It also may help your sinuses drain fluids.    Heat may help soothe painful areas of your face. Use a towel soaked in hot water. Or,  the shower and direct the warm spray onto your face. Using a vaporizer along with a menthol rub at night may also help soothe symptoms.     An expectorant with guaifenesin may help thin nasal mucus and help your sinuses drain fluids.    You can use an over-the-counter decongestant, unless a similar medicine was prescribed to you. Nasal sprays work the fastest. Use one that contains phenylephrine or oxymetazoline. First blow your nose gently. Then use the spray. Do not use these medicines more often than directed on the label. If you do, your symptoms may get worse. You may also take pills that contain pseudoephedrine. Don t use products that combine multiple medicines. This is because side effects may be increased. Read labels. You can also ask the pharmacist for help. (People with high blood pressure should not use decongestants. They can raise blood pressure.)    Over-the-counter antihistamines may help if allergies contributed to your sinusitis.      Do not use nasal rinses or irrigation during an acute sinus infection, unless your healthcare provider tells you to. Rinsing may spread the infection to other areas in your sinuses.    Use acetaminophen or ibuprofen to control pain, unless another pain medicine was prescribed to you. If you have chronic liver or kidney disease or ever had a stomach ulcer, talk with your healthcare provider before using these medicines. (Aspirin should never be taken by anyone under age 18 who is ill with a fever. It may cause severe liver damage.)    Don't smoke. This can make symptoms worse.  Follow-up care  Follow up with your healthcare provider or our staff if you are not better in 1 week.  When to seek medical advice  Call your healthcare  provider if any of these occur:    Facial pain or headache that gets worse    Stiff neck    Unusual drowsiness or confusion    Swelling of your forehead or eyelids    Vision problems, such as blurred or double vision    Fever of 100.4 F (38 C) or higher, or as directed by your healthcare provider    Seizure    Breathing problems    Symptoms don't go away in 10 days  Prevention  Here are steps you can take to help prevent an infection:    Keep good hand washing habits.    Don t have close contact with people who have sore throats, colds, or other upper respiratory infections.    Don t smoke, and stay away from secondhand smoke.    Stay up to date with of your vaccines.  Date Last Reviewed: 11/1/2017 2000-2019 [employer]. [address] [place] [address], [place] [address]. All rights reserved. This information is not intended as a substitute for professional medical care. Always follow your healthcare professional's instructions.

## 2020-01-06 NOTE — PROGRESS NOTES
Subjective     Muriel Porras is a 59 year old female who presents to clinic today for the following health issues:    HPI   Acute Illness   Acute illness concerns: Feeling generally unwell, cough; fatigue  Onset: x 2 weeks, no improvement     Fever: YES- intermittent    Chills/Sweats: YES- intermittent     Headache (location?): YES    Sinus Pressure:YES    Conjunctivitis:  no    Ear Pain: no    Rhinorrhea: YES    Congestion: YES    Sore Throat: YES- from cough      Cough: YES- cough is worsening and is having pain in upper ribs from coughing so much     Wheeze: no but reporting heaviness in chest     Decreased Appetite: no    Nausea: YES    Vomiting: no    Diarrhea:  no    Dysuria/Freq.: no    Fatigue/Achiness: YES- both    Sick/Strep Exposure: no    Reports a period of improvement and then recent worsening of symptoms.     Therapies Tried and outcome: tylenol, ibuprofen, zicam, airborne, day/ night quil, excedrin cold and flu, delsym     Ex smoker, states that he quit in 2016. States that she was a social smoker.    Patient Active Problem List   Diagnosis     Hypothyroidism due to acquired atrophy of thyroid     Osteopenia of both hips     Past Surgical History:   Procedure Laterality Date     COLONOSCOPY WITH CO2 INSUFFLATION N/A 9/13/2019    Procedure: COLONOSCOPY, WITH CO2 INSUFFLATION;  Surgeon: Marshall Kaba DO;  Location:  OR       Social History     Tobacco Use     Smoking status: Former Smoker     Packs/day: 1.00     Types: Cigarettes     Last attempt to quit: 4/1/2016     Years since quitting: 3.7     Smokeless tobacco: Never Used   Substance Use Topics     Alcohol use: Yes     Alcohol/week: 0.0 standard drinks     Comment: Occasional      Family History   Problem Relation Age of Onset     Colon Cancer Maternal Grandfather      Cerebrovascular Disease Mother 84     Myocardial Infarction Father         Stents     Pacemaker Father      Hypothyroidism Father      Hypothyroidism Sister      Colon Polyps  Daughter      Hypothyroidism Sister          Current Outpatient Medications   Medication Sig Dispense Refill     doxycycline hyclate (VIBRAMYCIN) 100 MG capsule Take 1 capsule (100 mg) by mouth 2 times daily for 10 days 20 capsule 0     guaiFENesin-codeine (ROBITUSSIN AC) 100-10 MG/5ML solution Take 5 mLs by mouth every 6 hours as needed for cough 118 mL 0     levothyroxine (SYNTHROID/LEVOTHROID) 200 MCG tablet Take 1 tablet (200 mcg) by mouth daily 90 tablet 3     Allergies   Allergen Reactions     Aspirin      Mouth lesions          Reviewed and updated as needed this visit by Provider  Med          Review of Systems   All others are negative except as above.        Objective    /74   Pulse 88   Temp 98.8  F (37.1  C) (Tympanic)   Resp 18   Wt 75.3 kg (166 lb)   LMP 09/01/2014   SpO2 97%   Breastfeeding No   BMI 30.55 kg/m    Body mass index is 30.55 kg/m .  Physical Exam   GENERAL: healthy, alert and no distress  EYES: Eyes grossly normal to inspection, PERRL and conjunctivae and sclerae normal  HENT: ear canals and TM's normal, bilateral nasal turbinate hypertrophy with bilateral maxillary sinus tenderness.  NECK: no adenopathy, no asymmetry, masses, or scars and thyroid normal to palpation  RESP: lungs clear to auscultation - no rales, rhonchi or wheezes  CV: regular rate and rhythm, normal S1 S2, no S3 or S4, no murmur, click or rub, no peripheral edema and peripheral pulses strong  MS: no gross musculoskeletal defects noted, no edema  PSYCH: mentation appears normal, affect normal/bright    Diagnostic Test Results:  Results for orders placed or performed in visit on 01/06/20   CBC with platelets and differential     Status: Abnormal   Result Value Ref Range    WBC 2.7 (L) 4.0 - 11.0 10e9/L    RBC Count 5.45 (H) 3.8 - 5.2 10e12/L    Hemoglobin 16.5 (H) 11.7 - 15.7 g/dL    Hematocrit 47.0 35.0 - 47.0 %    MCV 86 78 - 100 fl    MCH 30.3 26.5 - 33.0 pg    MCHC 35.1 31.5 - 36.5 g/dL    RDW 12.6  10.0 - 15.0 %    Platelet Count 160 150 - 450 10e9/L    % Eosinophils 0 %    % Basophils 0 %    Absolute Neutrophil 0.9 (L) 1.6 - 8.3 10e9/L    Absolute Lymphocytes 1.4 0.8 - 5.3 10e9/L    Absolute Monocytes 0.4 0.0 - 1.3 10e9/L    Absolute Eosinophils 0.0 0.0 - 0.7 10e9/L    Absolute Basophils 0.0 0.0 - 0.2 10e9/L    Diff Method Automated Method     RBC Morphology Normal     Platelet Estimate       Automated count confirmed.  Platelet morphology is normal.     CHEST TWO VIEWS 1/6/2020 2:47 PM      HISTORY: Malaise.     COMPARISON: 5/2/2019     FINDINGS: Heart size and pulmonary vascularity are within normal  limits. The lungs are clear. No pneumothorax or pleural effusion.                                                                       IMPRESSION: No radiographic evidence of acute chest abnormality.      AIDA SALCIDO MD        Assessment & Plan     Muriel was seen today for sinus problem.    Diagnoses and all orders for this visit:    Acute bronchitis with symptoms > 10 days  -     doxycycline hyclate (VIBRAMYCIN) 100 MG capsule; Take 1 capsule (100 mg) by mouth 2 times daily for 10 days  -     guaiFENesin-codeine (ROBITUSSIN AC) 100-10 MG/5ML solution; Take 5 mLs by mouth every 6 hours as needed for cough    Acute sinusitis with symptoms > 10 days  -     doxycycline hyclate (VIBRAMYCIN) 100 MG capsule; Take 1 capsule (100 mg) by mouth 2 times daily for 10 days    Malaise  -     XR Chest 2 Views  -     CBC with platelets and differential    Cough  -     XR Chest 2 Views      Patient education and Handout with home care instructions given. See AVS for details.      Return in about 1 week (around 1/13/2020), or if symptoms worsen or fail to improve.    Rosa Elena Holguin MD  [facility]

## 2020-01-08 ENCOUNTER — TELEPHONE (OUTPATIENT)
Dept: FAMILY MEDICINE | Facility: CLINIC | Age: 60
End: 2020-01-08

## 2020-01-08 DIAGNOSIS — J20.9 ACUTE BRONCHITIS WITH SYMPTOMS > 10 DAYS: Primary | ICD-10-CM

## 2020-01-08 DIAGNOSIS — J01.90 ACUTE SINUSITIS WITH SYMPTOMS > 10 DAYS: ICD-10-CM

## 2020-01-08 NOTE — TELEPHONE ENCOUNTER
Noted.   Let's discontinue the Doxycycline and start a different antibiotic- Augmentin.   Rx sent.   Recommend taking it with food.   Hope she feels better.

## 2020-01-08 NOTE — TELEPHONE ENCOUNTER
Reason for Call:  Medication or medication refill:    Do you use a [facility]?  Name of the pharmacy and phone number for the current request:  [facility] in [place] [phone]    Name of the medication requested: would like something for nausea    Other request: she is on an antibiotic for bronchitis/sinus infection, unable to keep it down.    Can we leave a detailed message on this number? YES    Phone number patient can be reached at: Home number on file [phone] (home)    Best Time: any    Call taken on 1/8/2020 at 8:16 AM by Misa Moncada

## 2020-01-08 NOTE — TELEPHONE ENCOUNTER
Left message on voice mail for patient to call clinic. [phone]      Judith Lai RN BSN

## 2020-01-08 NOTE — TELEPHONE ENCOUNTER
Patient was seen in the clinic on 1/6/20 and prescribed Doxycycline BID x 10 days.     She reports the following:     Monday evening, she vomited 15 minutes after taking her first dose of Doxycycline.     Yesterday she kept down the morning dose, however vomited again about 45-60 minutes after taking the evening dose.     Today patient vomited 10 minutes after she woke up and again an hour later. She tried to eat a Saltine cracker and Sprite soda prior to the last emesis.  She is afraid to eat, drink or take the Doxycycline at this point.     She states that she had been nauseated for a week or so prior to coming in on Monday, however the vomiting only started after taking the Doxycycline. No diarrhea reported.     Muriel tells me she drank sips yesterday and ate some mashed potatoes. She urinated 2-3 times yesterday and also once this morning.     I encouraged Muriel to continue to try to take sips of Gatorade or water  as she is able and slowly increase as tolerated. Patient verbalized a good understanding of this.     Patient wonders if there is something she can have for the nausea and wonders if there is a different antibiotic that she might tolerate better?     Please review and advise.     Judith Lai RN BSN

## 2020-01-08 NOTE — TELEPHONE ENCOUNTER
Directives from Dr. Holguin reviewed with Muriel and she verbalized a good understanding.     She states that she has had no more vomiting and has been able to drink water, Sprite soda and chicken broth.     Judith Lai RN BSN

## 2020-03-10 ENCOUNTER — HEALTH MAINTENANCE LETTER (OUTPATIENT)
Age: 60
End: 2020-03-10

## 2020-09-30 ENCOUNTER — OFFICE VISIT (OUTPATIENT)
Dept: FAMILY MEDICINE | Facility: CLINIC | Age: 60
End: 2020-09-30
Payer: COMMERCIAL

## 2020-09-30 ENCOUNTER — ANCILLARY PROCEDURE (OUTPATIENT)
Dept: CT IMAGING | Facility: CLINIC | Age: 60
End: 2020-09-30
Attending: FAMILY MEDICINE
Payer: COMMERCIAL

## 2020-09-30 VITALS
RESPIRATION RATE: 18 BRPM | SYSTOLIC BLOOD PRESSURE: 130 MMHG | BODY MASS INDEX: 29.81 KG/M2 | DIASTOLIC BLOOD PRESSURE: 84 MMHG | HEART RATE: 80 BPM | OXYGEN SATURATION: 99 % | WEIGHT: 162 LBS | TEMPERATURE: 97.3 F

## 2020-09-30 DIAGNOSIS — Z13.220 LIPID SCREENING: ICD-10-CM

## 2020-09-30 DIAGNOSIS — R42 DIZZINESS: ICD-10-CM

## 2020-09-30 DIAGNOSIS — Z23 NEED FOR PROPHYLACTIC VACCINATION AND INOCULATION AGAINST INFLUENZA: ICD-10-CM

## 2020-09-30 DIAGNOSIS — H81.10 BENIGN PAROXYSMAL POSITIONAL VERTIGO, UNSPECIFIED LATERALITY: Primary | ICD-10-CM

## 2020-09-30 LAB
ALBUMIN SERPL-MCNC: 3.9 G/DL (ref 3.4–5)
ALP SERPL-CCNC: 72 U/L (ref 40–150)
ALT SERPL W P-5'-P-CCNC: 27 U/L (ref 0–50)
ANION GAP SERPL CALCULATED.3IONS-SCNC: 9 MMOL/L (ref 3–14)
AST SERPL W P-5'-P-CCNC: 21 U/L (ref 0–45)
BILIRUB SERPL-MCNC: 0.5 MG/DL (ref 0.2–1.3)
BUN SERPL-MCNC: 10 MG/DL (ref 7–30)
CALCIUM SERPL-MCNC: 9 MG/DL (ref 8.5–10.1)
CHLORIDE SERPL-SCNC: 106 MMOL/L (ref 94–109)
CHOLEST SERPL-MCNC: 224 MG/DL
CO2 SERPL-SCNC: 24 MMOL/L (ref 20–32)
CREAT SERPL-MCNC: 0.63 MG/DL (ref 0.52–1.04)
ERYTHROCYTE [DISTWIDTH] IN BLOOD BY AUTOMATED COUNT: 13.1 % (ref 10–15)
GFR SERPL CREATININE-BSD FRML MDRD: >90 ML/MIN/{1.73_M2}
GLUCOSE SERPL-MCNC: 103 MG/DL (ref 70–99)
HCT VFR BLD AUTO: 43.2 % (ref 35–47)
HDLC SERPL-MCNC: 103 MG/DL
HGB BLD-MCNC: 14.9 G/DL (ref 11.7–15.7)
LDLC SERPL CALC-MCNC: 108 MG/DL
MCH RBC QN AUTO: 28.8 PG (ref 26.5–33)
MCHC RBC AUTO-ENTMCNC: 34.5 G/DL (ref 31.5–36.5)
MCV RBC AUTO: 84 FL (ref 78–100)
NONHDLC SERPL-MCNC: 121 MG/DL
PLATELET # BLD AUTO: 199 10E9/L (ref 150–450)
POTASSIUM SERPL-SCNC: 4.1 MMOL/L (ref 3.4–5.3)
PROT SERPL-MCNC: 7.6 G/DL (ref 6.8–8.8)
RBC # BLD AUTO: 5.17 10E12/L (ref 3.8–5.2)
SODIUM SERPL-SCNC: 139 MMOL/L (ref 133–144)
T4 FREE SERPL-MCNC: 1.86 NG/DL (ref 0.76–1.46)
TRIGL SERPL-MCNC: 65 MG/DL
TSH SERPL DL<=0.005 MIU/L-ACNC: <0.01 MU/L (ref 0.4–4)
VIT B12 SERPL-MCNC: 300 PG/ML (ref 193–986)
WBC # BLD AUTO: 5.1 10E9/L (ref 4–11)

## 2020-09-30 PROCEDURE — 85027 COMPLETE CBC AUTOMATED: CPT | Performed by: FAMILY MEDICINE

## 2020-09-30 PROCEDURE — 90471 IMMUNIZATION ADMIN: CPT | Performed by: FAMILY MEDICINE

## 2020-09-30 PROCEDURE — 36415 COLL VENOUS BLD VENIPUNCTURE: CPT | Performed by: FAMILY MEDICINE

## 2020-09-30 PROCEDURE — 70450 CT HEAD/BRAIN W/O DYE: CPT | Mod: TC

## 2020-09-30 PROCEDURE — 90682 RIV4 VACC RECOMBINANT DNA IM: CPT | Performed by: FAMILY MEDICINE

## 2020-09-30 PROCEDURE — 80061 LIPID PANEL: CPT | Performed by: FAMILY MEDICINE

## 2020-09-30 PROCEDURE — 82607 VITAMIN B-12: CPT | Performed by: FAMILY MEDICINE

## 2020-09-30 PROCEDURE — 84443 ASSAY THYROID STIM HORMONE: CPT | Performed by: FAMILY MEDICINE

## 2020-09-30 PROCEDURE — 82746 ASSAY OF FOLIC ACID SERUM: CPT | Performed by: FAMILY MEDICINE

## 2020-09-30 PROCEDURE — 80053 COMPREHEN METABOLIC PANEL: CPT | Performed by: FAMILY MEDICINE

## 2020-09-30 PROCEDURE — 84439 ASSAY OF FREE THYROXINE: CPT | Performed by: FAMILY MEDICINE

## 2020-09-30 PROCEDURE — 93000 ELECTROCARDIOGRAM COMPLETE: CPT | Performed by: FAMILY MEDICINE

## 2020-09-30 PROCEDURE — 99214 OFFICE O/P EST MOD 30 MIN: CPT | Mod: 25 | Performed by: FAMILY MEDICINE

## 2020-09-30 RX ORDER — MECLIZINE HYDROCHLORIDE 25 MG/1
25 TABLET ORAL 3 TIMES DAILY PRN
Qty: 30 TABLET | Refills: 0 | Status: SHIPPED | OUTPATIENT
Start: 2020-09-30 | End: 2021-07-13

## 2020-09-30 NOTE — PROGRESS NOTES
Subjective     Muriel Porras is a 60 year old female who presents to clinic today for the following health issues:    HPI       Dizziness  Onset/Duration: x 2 days  Description:   Do you feel faint: YES  Does it feel like the surroundings (bed, room) are moving: YES  Unsteady/off balance: YES  Have you passed out or fallen: no  Intensity: severe  Progression of Symptoms: worsening as of this morning   Accompanying Signs & Symptoms:  Heart palpitations or chest pain: no  Nausea, vomiting: YES- vomiting this morning   Weakness or lack of coordination in arms or legs: YES- lack of coordination in legs   Vision or speech changes: no  Numbness or tingling: YES- lips since vomiting   Ringing in ears (Tinnitus): YES- ringing and hears things moving in ears, also reporting headaches   Hearing Loss: no  History:   Head trauma/concussion history: no  Previous similar symptoms: YES  Recent bleeding history: no  Any new medications (BP?): no  Precipitating factors:   Worse with activity: YES  Worse with head movement: YES  Alleviating factors:   Does staying in a fixed position give relief: YES- not full relief but does help   Therapies tried and outcome: None          Review of Systems   Constitutional, HEENT, cardiovascular, pulmonary, gi and gu systems are negative, except as otherwise noted.      Objective    /84 (BP Location: Left arm, Patient Position: Standing, Cuff Size: Adult Regular)   Pulse 80   Temp 97.3  F (36.3  C) (Tympanic)   Resp 18   Wt 73.5 kg (162 lb)   LMP 09/01/2014   SpO2 99%   BMI 29.81 kg/m    Body mass index is 29.81 kg/m .  Physical Exam   GENERAL: healthy, alert and no distress  EYES: Eyes grossly normal to inspection, PERRL and conjunctivae and sclerae normal  HENT: ear canals and TM's normal, nose and mouth without ulcers or lesions  NECK: no adenopathy, no asymmetry, masses, or scars and thyroid normal to palpation  RESP: lungs clear to auscultation - no rales, rhonchi or wheezes  CV:  regular rate and rhythm, normal S1 S2, no S3 or S4, no murmur, click or rub, no peripheral edema and peripheral pulses strong  NEURO: Normal strength and tone, sensory exam grossly normal, mentation intact and cranial nerves 2-12 intact    DATA  Reviewed and discussed with patient prior to discharge.  Results for orders placed or performed in visit on 09/30/20   CT Head w/o Contrast     Status: None    Narrative    CT OF THE HEAD WITHOUT CONTRAST September 30, 2020 12:05 PM     HISTORY: Vertigo, episodic, peripheral. Dizziness.    TECHNIQUE: Axial CT images of the head from the skull base to the  vertex were acquired without IV contrast. Radiation dose for this scan  was reduced using automated exposure control, adjustment of the mA  and/or kV according to patient size, or iterative reconstruction  technique.    COMPARISON: None.    FINDINGS: The ventricles and basal cisterns are within normal limits  in configuration. There is no midline shift. There are no extra-axial  fluid collections.  Gray-white differentiation is well maintained.    No intracranial hemorrhage, mass or recent infarct.    There is near complete opacification of the ethmoid air cells  bilaterally by fluid and mucosal thickening. There is mild peripheral  mucosal thickening in the maxillary sinuses bilaterally and sphenoid  sinus. There is no mastoiditis. There are no fractures of the  visualized bones.       Impression    IMPRESSION:  1. Inflammatory paranasal sinus disease as detailed above.  2. Otherwise, normal head CT.        NELSON FRANCIS MD   Results for orders placed or performed in visit on 09/30/20   CBC with platelets     Status: None   Result Value Ref Range    WBC 5.1 4.0 - 11.0 10e9/L    RBC Count 5.17 3.8 - 5.2 10e12/L    Hemoglobin 14.9 11.7 - 15.7 g/dL    Hematocrit 43.2 35.0 - 47.0 %    MCV 84 78 - 100 fl    MCH 28.8 26.5 - 33.0 pg    MCHC 34.5 31.5 - 36.5 g/dL    RDW 13.1 10.0 - 15.0 %    Platelet Count 199 150 - 450 10e9/L      EKG: appears normal, NSR, normal axis, normal intervals, no acute ST/T changes c/w ischemia, no LVH by voltage criteria, unchanged from previous tracings          Assessment & Plan     Muriel was seen today for dizziness.    Diagnoses and all orders for this visit:    Benign paroxysmal positional vertigo, unspecified laterality  -     meclizine (ANTIVERT) 25 MG tablet; Take 1 tablet (25 mg) by mouth 3 times daily as needed for dizziness  -     PHYSICAL THERAPY REFERRAL; Future    Dizziness  -     TSH with free T4 reflex  -     CBC with platelets  -     Comprehensive metabolic panel  -     EKG 12-lead complete w/read - Clinics  -     Vitamin B12  -     Folate  -     CT Head w/o Contrast; Future    Lipid screening  -     Lipid panel reflex to direct LDL Fasting    Need for prophylactic vaccination and inoculation against influenza  -     INFLUENZA QUAD, RECOMBINANT, P-FREE (RIV4) (FLUBLOCK) [56864]  -     VACCINE ADMINISTRATION, INITIAL         Patient education and Handout with home care instructions given. See AVS for details.      Return in about 1 week (around 10/7/2020), or if symptoms worsen or fail to improve.    Rosa Elena Holguin MD  [facility]

## 2020-09-30 NOTE — PATIENT INSTRUCTIONS
Patient Education     Inner Ear Problems: Causes of Dizziness (Vertigo)       Benign positional vertigo (BPV)  This is the most common cause of vertigo. BPV is also called benign positional paroxysmal vertigo (BPPV). It happens when crystals in the ear canals shift into the wrong place. Vertigo usually occurs when you move your head in a certain way. This can happen when turning in bed, bending, or looking up. Because BPV comes on quickly, you should think about if you are safe to drive or do other tasks that need your full attention.  BPV:    Causes vertigo that last for seconds. Vertigo can occur several times a day, depending on body position.    Doesn t cause hearing loss    Often goes away on its own. But it but may go away sooner with treatment.  Infection or inflammation  Sometimes the semicircular canals swell and send incorrect balance signals. This problem may be caused by a viral infection. Depending on the cause, your hearing can be affected (labyrinthitis). Or your hearing can remain normal (neuronitis).  Infection or inflammation:    Causes vertigo that lasts for hours or days. The first episode is usually the worst.    Can cause hearing loss    Often goes away on its own. But it may go away sooner with treatment.  You may need vestibular rehabilitation if you have balance problems that don't go away.  Meniere s disease  This condition is uncommon. It happens when there is too much fluid in the ear canals. This causes increased pressure and swelling. It affects balance and hearing signals.  Meniere s disease may:    Cause vertigo that last for hours    Cause hearing problems that come and go. The problems are usually in one ear and get worse over time.    Cause buzzing or ringing in the ears (tinnitus)    Cause a feeling of fullness or pressure in the ear    Cause any of these symptoms: vertigo, hearing loss, tinnitus, or ear fullness to last a lifetime  Date Last Reviewed: 11/1/2016 2000-2019 [employer]  [employer]. [address] [place] [address], [place] [address]. All rights reserved. This information is not intended as a substitute for professional medical care. Always follow your healthcare professional's instructions.

## 2020-09-30 NOTE — LETTER
October 7, 2020      Muriel Porras  [address] [facility] [address]        Dear ,    We are writing to inform you of your test results.  We have tried to reach you by phone.    Your recent labs showed-      Thyroid function test is overactive on the current dose of Levothyroxine.   Recommend that we decrease the dose and repeat labs in 4-6 weeks.   New Rx sent. Future labs ordered.      Vitamin B12 was on the low side of normal- I recommend that you take OTC Vitamin B12 supplementation at least 1000 mcg/day.      Complete Metabolic Panel (panel that checks liver function, kidney function and electrolytes) was normal.     Cholesterol was slightly elevated but within goal. No cholesterol lowering medications recommended at this time.      Resulted Orders   Lipid panel reflex to direct LDL Fasting   Result Value Ref Range    Cholesterol 224 (H) <200 mg/dL      Comment:      Desirable:       <200 mg/dl    Triglycerides 65 <150 mg/dL      Comment:      Fasting specimen    HDL Cholesterol 103 >49 mg/dL    LDL Cholesterol Calculated 108 (H) <100 mg/dL      Comment:      Above desirable:  100-129 mg/dl  Borderline High:  130-159 mg/dL  High:             160-189 mg/dL  Very high:       >189 mg/dl      Non HDL Cholesterol 121 <130 mg/dL   TSH with free T4 reflex   Result Value Ref Range    TSH <0.01 (L) 0.40 - 4.00 mU/L   CBC with platelets   Result Value Ref Range    WBC 5.1 4.0 - 11.0 10e9/L    RBC Count 5.17 3.8 - 5.2 10e12/L    Hemoglobin 14.9 11.7 - 15.7 g/dL    Hematocrit 43.2 35.0 - 47.0 %    MCV 84 78 - 100 fl    MCH 28.8 26.5 - 33.0 pg    MCHC 34.5 31.5 - 36.5 g/dL    RDW 13.1 10.0 - 15.0 %    Platelet Count 199 150 - 450 10e9/L   Comprehensive metabolic panel   Result Value Ref Range    Sodium 139 133 - 144 mmol/L    Potassium 4.1 3.4 - 5.3 mmol/L    Chloride 106 94 - 109 mmol/L    Carbon Dioxide 24 20 - 32 mmol/L    Anion Gap 9 3 - 14 mmol/L    Glucose 103 (H) 70 - 99 mg/dL      Comment:       Fasting specimen    Urea Nitrogen 10 7 - 30 mg/dL    Creatinine 0.63 0.52 - 1.04 mg/dL    GFR Estimate >90 >60 mL/min/[1.73_m2]      Comment:      Non  GFR Calc  Starting 12/18/2018, serum creatinine based estimated GFR (eGFR) will be   calculated using the Chronic Kidney Disease Epidemiology Collaboration   (CKD-EPI) equation.      GFR Estimate If Black >90 >60 mL/min/[1.73_m2]      Comment:       GFR Calc  Starting 12/18/2018, serum creatinine based estimated GFR (eGFR) will be   calculated using the Chronic Kidney Disease Epidemiology Collaboration   (CKD-EPI) equation.      Calcium 9.0 8.5 - 10.1 mg/dL    Bilirubin Total 0.5 0.2 - 1.3 mg/dL    Albumin 3.9 3.4 - 5.0 g/dL    Protein Total 7.6 6.8 - 8.8 g/dL    Alkaline Phosphatase 72 40 - 150 U/L    ALT 27 0 - 50 U/L    AST 21 0 - 45 U/L   Vitamin B12   Result Value Ref Range    Vitamin B12 300 193 - 986 pg/mL   Folate   Result Value Ref Range    Folate 21.5 >5.4 ng/mL   T4 free   Result Value Ref Range    T4 Free 1.86 (H) 0.76 - 1.46 ng/dL       If you have any questions or concerns, please call the clinic at the number listed above.       Sincerely,        Rosa Elena Holguin MD/yulisao

## 2020-10-01 LAB — FOLATE SERPL-MCNC: 21.5 NG/ML

## 2020-10-05 DIAGNOSIS — E03.9 HYPOTHYROIDISM, UNSPECIFIED TYPE: ICD-10-CM

## 2020-10-05 RX ORDER — LEVOTHYROXINE SODIUM 175 UG/1
175 TABLET ORAL DAILY
Qty: 30 TABLET | Refills: 1 | Status: SHIPPED | OUTPATIENT
Start: 2020-10-05 | End: 2021-07-13

## 2020-11-16 ENCOUNTER — TRANSFERRED RECORDS (OUTPATIENT)
Dept: HEALTH INFORMATION MANAGEMENT | Facility: CLINIC | Age: 60
End: 2020-11-16

## 2020-12-28 ENCOUNTER — ANCILLARY PROCEDURE (OUTPATIENT)
Dept: MAMMOGRAPHY | Facility: CLINIC | Age: 60
End: 2020-12-28
Attending: PHYSICIAN ASSISTANT
Payer: COMMERCIAL

## 2020-12-28 DIAGNOSIS — Z12.31 VISIT FOR SCREENING MAMMOGRAM: ICD-10-CM

## 2020-12-28 PROCEDURE — 77063 BREAST TOMOSYNTHESIS BI: CPT | Mod: TC | Performed by: RADIOLOGY

## 2020-12-28 PROCEDURE — 77067 SCR MAMMO BI INCL CAD: CPT | Mod: TC | Performed by: RADIOLOGY

## 2021-02-26 ENCOUNTER — OFFICE VISIT (OUTPATIENT)
Dept: FAMILY MEDICINE | Facility: CLINIC | Age: 61
End: 2021-02-26
Payer: COMMERCIAL

## 2021-02-26 VITALS
HEIGHT: 61 IN | OXYGEN SATURATION: 100 % | RESPIRATION RATE: 16 BRPM | SYSTOLIC BLOOD PRESSURE: 151 MMHG | WEIGHT: 167.4 LBS | BODY MASS INDEX: 31.6 KG/M2 | HEART RATE: 56 BPM | DIASTOLIC BLOOD PRESSURE: 77 MMHG | TEMPERATURE: 97.2 F

## 2021-02-26 DIAGNOSIS — K12.0 APHTHOUS ULCER OF MOUTH: Primary | ICD-10-CM

## 2021-02-26 PROCEDURE — 99213 OFFICE O/P EST LOW 20 MIN: CPT | Performed by: PHYSICIAN ASSISTANT

## 2021-02-26 RX ORDER — DEXAMETHASONE 0.5 MG/5ML
ELIXIR ORAL
Qty: 474 ML | Refills: 0 | Status: SHIPPED | OUTPATIENT
Start: 2021-02-26 | End: 2021-08-17

## 2021-02-26 ASSESSMENT — MIFFLIN-ST. JEOR: SCORE: 1268.76

## 2021-02-26 NOTE — PROGRESS NOTES
"    Assessment & Plan   Problem List Items Addressed This Visit     None      Visit Diagnoses     Aphthous ulcer of mouth    -  Primary    Relevant Medications    dexamethasone (DECADRON) 0.5 MG/5ML elixir         Impression is isolated aphthous ulcer. Per history has had these in various spots of the oropharynx for one month. No clear cause. No other symptoms. Will treat with dexamethasone elixir swish and spit and continued benzocaine otc. Follow up in one month if not improving or at any time if worsening.  Appears well and non-toxic and I have low suspicion for impending airway obstruction, systemic illness or respiratory distress.    27 minutes spent on the date of the encounter doing chart review, history and exam, documentation and further activities as noted above  {  See Patient Instructions    Return in about 1 month (around 3/26/2021) for a recheck of your symptoms if not improving.    MARISOL [facility] ERNESTO Llamas is a 60 year old who presents for the following health issues:    HPI     Has had aphthous ulcers intermittently her whole life, but has had ~10 in the last month. No tobacco use. No new medications.     Concern - Mouth sores  Onset: mid January 2021  Description: all over inside the mouth, canker sores with white dots all around it  Intensity: moderate  Progression of Symptoms:  same  Accompanying Signs & Symptoms: No  Previous history of similar problem: Yes  Precipitating factors:        Worsened by: don't know  Alleviating factors:        Improved by: No  Therapies tried and outcome: gargling with salt water and baking soda, OTC canka cream    Review of Systems   Constitutional, HEENT, cardiovascular, pulmonary, gi and gu systems are negative, except as otherwise noted.      Objective    BP (!) 151/77   Pulse 56   Temp 97.2  F (36.2  C) (Tympanic)   Resp 16   Ht 1.561 m (5' 1.45\")   Wt 75.9 kg (167 lb 6.4 oz)   LMP 09/01/2014   SpO2 " 100%   BMI 31.17 kg/m    Body mass index is 31.17 kg/m .  Physical Exam  Vitals signs and nursing note reviewed.   Constitutional:       General: She is not in acute distress.     Appearance: Normal appearance. She is not diaphoretic.   HENT:      Head: Normocephalic and atraumatic.      Nose: Nose normal.      Mouth/Throat:      Mouth: Mucous membranes are moist.      Pharynx: No oropharyngeal exudate or posterior oropharyngeal erythema.      Comments: Left lower buccal mucosa has a 3mm diameter aphthous ulcer.   Eyes:      Conjunctiva/sclera: Conjunctivae normal.   Neck:      Musculoskeletal: No muscular tenderness.   Pulmonary:      Effort: Pulmonary effort is normal. No respiratory distress.   Lymphadenopathy:      Cervical: No cervical adenopathy.   Skin:     General: Skin is dry.      Coloration: Skin is not jaundiced or pale.   Neurological:      General: No focal deficit present.      Mental Status: She is alert. Mental status is at baseline.   Psychiatric:         Mood and Affect: Mood normal.         Behavior: Behavior normal.

## 2021-02-26 NOTE — PATIENT INSTRUCTIONS
Rosibel Llamas,    Thank you for allowing [facility] to manage your care.    I sent your prescriptions to your pharmacy.    If you have any questions or concerns, please feel free to call us at [phone]    Sincerely,    Anthony Bruce PA-C    Did you know?      You can schedule a video visit for follow-up appointments as well as future appointments for certain conditions.  Please see the below link.     [email or web address]    If you have not already done so,  I encourage you to sign up for byydt ([email or web address]).  This will allow you to review your results, securely communicate with a provider, and schedule virtual visits as well.      Patient Education     Understanding Canker Sores  Canker sores (aphthous ulcers) are small, painful sores in the mouth. They occur most often on the tongue, gums, or insides of the cheeks.  What causes a canker sore?  The exact cause of canker sores is not known. But they are linked to different conditions. These include:    An injury or irritation in the mouth, such as biting the inside of your cheek or braces rubbing    Allergy or sensitivity to certain foods or substances, such as citrus juice or some kinds of toothpaste    Poor nutrition    Emotional stress    Certain infections and illnesses  Canker sores often run in families.  What are the symptoms of a canker sore?  These are some common symptoms of canker sores:    Sores are open and grayish-yellow, surrounded by redness.    Sores are often painful and sensitive to touch.    There may be a burning or tingling feeling a few hours to a few days before the sore appears.    Children and teens tend to get canker sores more often than adults.  How are canker sores treated?  Canker sores often go away by themselves within 10 to 14 days. There is no cure for canker sores. Treatment focuses on easing symptoms and shortening outbreaks. Treatments may  include:    Prescription or other-the-counter topical treatments. These medicines are put right on the sores. Topical steroids may protect the canker sores from more irritation and allow them to heal. Topical pain relief medicines may numb the area and make the sores less painful.    Certain types of toothpaste. These don't contain sodium lauryl sulfate. This type of toothpaste may prevent further aggravation of canker sores.    Oral prescription medicines. Oral medicines are taken by mouth. These are used for severe cases to help ease symptoms.    Prescription or over-the-counter pain medicines.  These help with mild pain.  What are possible complications of a canker sore?  Mouth sores that seem to be canker sores can be signs of a more serious illness. If you have other signs of illness along with mouth sores, talk with a healthcare provider. Canker sores can be so painful that they cause problems with talking, eating, or drinking.  When should I call my healthcare provider?  Call your healthcare provider right away if you have any of these:    Canker sores that don t go away after 2 weeks    Canker sores that come back more than 3 times a year    Canker sores that are larger than about a half-inch across    Fever of 100.4 F (38 C) or higher, or as directed    Pain that gets worse    You aren t able to eat or drink because of painful sores    Symptoms that don t get better, or symptoms that get worse    New symptoms  Ricardo last reviewed this educational content on 6/1/2019 2000-2020 [employer]. [address] [place] [address], [place] [address]. All rights reserved. This information is not intended as a substitute for professional medical care. Always follow your healthcare professional's instructions.

## 2021-03-02 ENCOUNTER — OFFICE VISIT (OUTPATIENT)
Dept: PODIATRY | Facility: CLINIC | Age: 61
End: 2021-03-02
Payer: COMMERCIAL

## 2021-03-02 VITALS
WEIGHT: 167 LBS | BODY MASS INDEX: 31.1 KG/M2 | SYSTOLIC BLOOD PRESSURE: 147 MMHG | HEART RATE: 70 BPM | DIASTOLIC BLOOD PRESSURE: 74 MMHG

## 2021-03-02 DIAGNOSIS — M76.822 POSTERIOR TIBIAL TENDINITIS OF LEFT LOWER EXTREMITY: Primary | ICD-10-CM

## 2021-03-02 PROCEDURE — 99214 OFFICE O/P EST MOD 30 MIN: CPT | Performed by: PODIATRIST

## 2021-03-02 NOTE — LETTER
3/2/2021         RE: Muriel Porras  [address]  [place] [address]        Dear Colleague,    Thank you for referring your patient, Muriel Porras, to the [facility]. Please see a copy of my visit note below.    S:  Complains of left foot pain.  Points to posterior tibial tendon where is courses around the medial malleoli.  Has had this for approximately 4 years.   Patient works on her feet and getting harder to do this.  She is taken ibuprofen to relieve the pain.  It is gotten worse over the last 6 months.  She also has pain in her sinus tarsi.  Pain aggravated by activity and relieved by rest.  She had orthotics made in the past.  She did not wear these because they were too hard.  Patient also had ankle brace.  She still wears this at times.  She is basically just wearing tennis shoes.  It sounds as though around the house she is now wearing a shoe that is very supportive.  She has some edema.  She denies any erythema ecchymosis numbness    ROS: See above       Allergies   Allergen Reactions     Aspirin      Mouth lesions        Current Outpatient Medications   Medication Sig Dispense Refill     dexamethasone (DECADRON) 0.5 MG/5ML elixir Swish 5ml in your mouth for 5 minutes 3 times daily as needed and then spit out. Avoid eating or drinking for 30 minutes after use. For aphthous ulcers. 474 mL 0     levothyroxine (SYNTHROID/LEVOTHROID) 175 MCG tablet Take 1 tablet (175 mcg) by mouth daily 30 tablet 1     meclizine (ANTIVERT) 25 MG tablet Take 1 tablet (25 mg) by mouth 3 times daily as needed for dizziness 30 tablet 0       Patient Active Problem List   Diagnosis     Hypothyroidism due to acquired atrophy of thyroid     Osteopenia of both hips       Past Medical History:   Diagnosis Date     Hypothyroidism (acquired)        Past Surgical History:   Procedure Laterality Date     COLONOSCOPY WITH CO2 INSUFFLATION N/A 9/13/2019    Procedure: COLONOSCOPY, WITH CO2  INSUFFLATION;  Surgeon: Marshall Kaba DO;  Location: [facility] OR       Family History   Problem Relation Age of Onset     Colon Cancer Maternal Grandfather      Cerebrovascular Disease Mother 84     Myocardial Infarction Father         Stents     Pacemaker Father      Hypothyroidism Father      Hypothyroidism Sister      Cancer Sister      No Known Problems Brother      Colon Polyps Daughter      Hypothyroidism Sister      No Known Problems Sister        Social History     Tobacco Use     Smoking status: Former Smoker     Packs/day: 1.00     Types: Cigarettes     Quit date: 2016     Years since quittin.9     Smokeless tobacco: Never Used   Substance Use Topics     Alcohol use: Yes     Alcohol/week: 0.0 standard drinks     Comment: rare         Exam:  Vitals: BP (!) 147/74   Pulse 70   Wt 75.8 kg (167 lb)   LMP 2014   BMI 31.10 kg/m    BMI: Body mass index is 31.1 kg/m .  Height: Data Unavailable    Constitutional/ general:  Pt is in no apparent distress, appears well-nourished.  Cooperative with history and physical exam.     Psych:  The patient answered questions appropriately.  Normal affect.  Seems to have reasonable expectations, in terms of treatment.     Eyes:  Visual scanning/ tracking without deficit.     Ears:  Response to auditory stimuli is normal.    Auricles in proper alignment.     Lymphatic:  Popliteal lymph nodes not enlarged.     Lungs:  Non labored breathing, non labored speech. No cough.  No audible wheezing. Even, quiet breathing.       Vascular:  Pedal pulses are palpable bilaterally for both the DP and PT arteries.  CFT < 3 sec.  No edema.  No varicosities.  Pedal hair growth noted.     Neuro:  Alert and oriented x 3. Coordinated gait.  Light touch sensation is intact to the L4, L5, S1 distributions. No obvious deficits.  No evidence of neurological-based weakness, spasticity, or contracture in the lower extremities.    Derm: Normal texture and turgor.  No erythema, ecchymosis,  or cyanosis.  No open lesions.     Musculoskeletal:    Lower extremity muscle strength is normal.  Patient is ambulatory without an assistive device or brace.  No gross deformities.  Normal ROM all fore foot and rearfoot joints.  No equinus.  With weightbearing patient has bilateral pronation.  Pain with palpation and stressing the left posterior tibial tendon.  There is edema here.  Slight pain over the sinus tarsi.     Radiographic:    MR ANKLE LEFT WITHOUT CONTRAST June 12, 2018 7:24 AM      HISTORY: MRI scan left ankle to evaluate posterior tibial tendon. Left  foot pain.     TECHNIQUE: Sagittal and coronal T1 and inversion recovery, and   transverse proton density and T2 weighted images.     COMPARISON: None.     FINDINGS:  Plantar Fascia: Unremarkable, with no findings to suggest active  plantar fasciitis.      Osseous and Cartilaginous Structures: Mild pes planus. No evidence of  acute fracture or subluxation. Mild bone marrow edema in the medial  malleolus is likely a bone contusion. Talar dome and talar dome  articular cartilage are intact. There is mild edema surrounding the  calcaneocuboid joint which may be degenerative in nature.      Posterior Tibial and Flexor Tendons: There appears to be mild  longitudinal splitting of the tibialis posterior tendon as seen on  axial series 4 image 23. No evidence of tibialis posterior tendon  tear. FDL and FHL tendons are intact.      Peroneal Tendons: No tear, tendinosis, or apparent longitudinal  splitting of the peroneus brevis tendon or peroneus longus tendon. No  tendon subluxation.      Achilles Tendon: No tear or tendinosis.      Extensor Tendons: No tear tendinosis of the anterior tibial tendon  extensor hallux longus tendons or extensor digitorum longus tendons.      Lateral Ligaments: The anterior talofibular ligament appears intact.  The calcaneofibular, posterior talofibular, and anterior tibiofibular  ligaments appear intact.      Medial Deltoid  Ligamentous Complex: Intact.      Joint space: No tibiotalar or subtalar joint effusion.     Additional Findings: No retrocalcaneal bursitis. There is a 1.5 x 1.9  cm septated cystic lesion in the tarsal tunnel likely a ganglion cyst.  This does cause some mass effect in the tarsal tunnel. Mild fluid in  the bursa Gruberi near the sinus tarsi.                                                                      IMPRESSION:    1. Mild pes planus.  2. Mild bone marrow edema in the medial malleolus likely related to  contusion.  3. There is a 1.5 x 1.9 cm septated cystic lesion in the sinus tarsi  likely a ganglion cyst that does result in mass effect.   4. There may be mild longitudinal splitting of the tibialis posterior  tendon but no evidence of rupture.  5. Mild calcaneocuboid degenerative changes.  6. Mild fluid in the bursa of Gruberi near the sinus tarsi may  represent mild bursitis.    A:  Pronation and posterior tibial tendonitis, sinus tarsi syndrome    P: Reviewed past MRI.  Discussed with patient that increasing pain and swelling may be from tendon tear.  Discussed that without orthotics are wearing ankle brace all the time this foot has to work much harder.  Explained that if she has a tear in the tendon she may need surgery on this repaired.  Briefly discussed surgery and recovery.  Discussed this may be done in conjunction with flatfoot surgery.  We will get MRI for further evaluation and call patient with results.    Jose Anaya DPM DPM, FACFAS             Again, thank you for allowing me to participate in the care of your patient.        Sincerely,        Jose Anaya DPM

## 2021-03-02 NOTE — PATIENT INSTRUCTIONS
We wish you continued good healing. If you have any questions or concerns, please do not hesitate to contact us at [phone]    [email or web address] (secure e-mail communication and access to your chart) to send a message or to make an appointment.    Please remember to call and schedule a follow up appointment if one was recommended at your earliest convenience.     +++OF MARCH 2020+++ LOCATION AND HOURS HAVE CHANGED    PLEASE CALL CLINICS TO VERIFY DAYS AND TIMES  PODIATRY CLINIC HOURS  TELEPHONE NUMBER    Dr. Jose MUHAMMADPKARYN [facility]        Clinics:  [facility]   Tuesday 1PM-6PM  [place]  Wednesday 745AM-330PM  [place]  Thursday/Friday 745AM-230PM  [place] CARRASCO/JOSE APPOINTMENTS  [phone]    [place] APPOINTMENTS  [phone]          If you need a medication refill, please contact us you may need lab work and/or a follow up visit prior to your refill (i.e. Antifungal medications).    If MRI needed please call Imaging at [phone] or [phone]    HOW DO I GET MY KNEE SCOOTER? Knee scooters can be picked up at ANY Medical Supply stores with your knee scooter Prescription.  OR    Bring your signed prescription to an [facility] Medical [facility] showroom.

## 2021-03-03 NOTE — PROGRESS NOTES
S:  Complains of left foot pain.  Points to posterior tibial tendon where is courses around the medial malleoli.  Has had this for approximately 4 years.   Patient works on her feet and getting harder to do this.  She is taken ibuprofen to relieve the pain.  It is gotten worse over the last 6 months.  She also has pain in her sinus tarsi.  Pain aggravated by activity and relieved by rest.  She had orthotics made in the past.  She did not wear these because they were too hard.  Patient also had ankle brace.  She still wears this at times.  She is basically just wearing tennis shoes.  It sounds as though around the house she is now wearing a shoe that is very supportive.  She has some edema.  She denies any erythema ecchymosis numbness    ROS: See above       Allergies   Allergen Reactions     Aspirin      Mouth lesions        Current Outpatient Medications   Medication Sig Dispense Refill     dexamethasone (DECADRON) 0.5 MG/5ML elixir Swish 5ml in your mouth for 5 minutes 3 times daily as needed and then spit out. Avoid eating or drinking for 30 minutes after use. For aphthous ulcers. 474 mL 0     levothyroxine (SYNTHROID/LEVOTHROID) 175 MCG tablet Take 1 tablet (175 mcg) by mouth daily 30 tablet 1     meclizine (ANTIVERT) 25 MG tablet Take 1 tablet (25 mg) by mouth 3 times daily as needed for dizziness 30 tablet 0       Patient Active Problem List   Diagnosis     Hypothyroidism due to acquired atrophy of thyroid     Osteopenia of both hips       Past Medical History:   Diagnosis Date     Hypothyroidism (acquired)        Past Surgical History:   Procedure Laterality Date     COLONOSCOPY WITH CO2 INSUFFLATION N/A 9/13/2019    Procedure: COLONOSCOPY, WITH CO2 INSUFFLATION;  Surgeon: Marshall Kaba DO;  Location: [facility] OR       Family History   Problem Relation Age of Onset     Colon Cancer Maternal Grandfather      Cerebrovascular Disease Mother 84     Myocardial Infarction Father         Stents     Pacemaker Father       Hypothyroidism Father      Hypothyroidism Sister      Cancer Sister      No Known Problems Brother      Colon Polyps Daughter      Hypothyroidism Sister      No Known Problems Sister        Social History     Tobacco Use     Smoking status: Former Smoker     Packs/day: 1.00     Types: Cigarettes     Quit date: 2016     Years since quittin.9     Smokeless tobacco: Never Used   Substance Use Topics     Alcohol use: Yes     Alcohol/week: 0.0 standard drinks     Comment: rare         Exam:  Vitals: BP (!) 147/74   Pulse 70   Wt 75.8 kg (167 lb)   LMP 2014   BMI 31.10 kg/m    BMI: Body mass index is 31.1 kg/m .  Height: Data Unavailable    Constitutional/ general:  Pt is in no apparent distress, appears well-nourished.  Cooperative with history and physical exam.     Psych:  The patient answered questions appropriately.  Normal affect.  Seems to have reasonable expectations, in terms of treatment.     Eyes:  Visual scanning/ tracking without deficit.     Ears:  Response to auditory stimuli is normal.    Auricles in proper alignment.     Lymphatic:  Popliteal lymph nodes not enlarged.     Lungs:  Non labored breathing, non labored speech. No cough.  No audible wheezing. Even, quiet breathing.       Vascular:  Pedal pulses are palpable bilaterally for both the DP and PT arteries.  CFT < 3 sec.  No edema.  No varicosities.  Pedal hair growth noted.     Neuro:  Alert and oriented x 3. Coordinated gait.  Light touch sensation is intact to the L4, L5, S1 distributions. No obvious deficits.  No evidence of neurological-based weakness, spasticity, or contracture in the lower extremities.    Derm: Normal texture and turgor.  No erythema, ecchymosis, or cyanosis.  No open lesions.     Musculoskeletal:    Lower extremity muscle strength is normal.  Patient is ambulatory without an assistive device or brace.  No gross deformities.  Normal ROM all fore foot and rearfoot joints.  No equinus.  With weightbearing  patient has bilateral pronation.  Pain with palpation and stressing the left posterior tibial tendon.  There is edema here.  Slight pain over the sinus tarsi.     Radiographic:    MR ANKLE LEFT WITHOUT CONTRAST June 12, 2018 7:24 AM      HISTORY: MRI scan left ankle to evaluate posterior tibial tendon. Left  foot pain.     TECHNIQUE: Sagittal and coronal T1 and inversion recovery, and   transverse proton density and T2 weighted images.     COMPARISON: None.     FINDINGS:  Plantar Fascia: Unremarkable, with no findings to suggest active  plantar fasciitis.      Osseous and Cartilaginous Structures: Mild pes planus. No evidence of  acute fracture or subluxation. Mild bone marrow edema in the medial  malleolus is likely a bone contusion. Talar dome and talar dome  articular cartilage are intact. There is mild edema surrounding the  calcaneocuboid joint which may be degenerative in nature.      Posterior Tibial and Flexor Tendons: There appears to be mild  longitudinal splitting of the tibialis posterior tendon as seen on  axial series 4 image 23. No evidence of tibialis posterior tendon  tear. FDL and FHL tendons are intact.      Peroneal Tendons: No tear, tendinosis, or apparent longitudinal  splitting of the peroneus brevis tendon or peroneus longus tendon. No  tendon subluxation.      Achilles Tendon: No tear or tendinosis.      Extensor Tendons: No tear tendinosis of the anterior tibial tendon  extensor hallux longus tendons or extensor digitorum longus tendons.      Lateral Ligaments: The anterior talofibular ligament appears intact.  The calcaneofibular, posterior talofibular, and anterior tibiofibular  ligaments appear intact.      Medial Deltoid Ligamentous Complex: Intact.      Joint space: No tibiotalar or subtalar joint effusion.     Additional Findings: No retrocalcaneal bursitis. There is a 1.5 x 1.9  cm septated cystic lesion in the tarsal tunnel likely a ganglion cyst.  This does cause some mass effect  in the tarsal tunnel. Mild fluid in  the bursa Gruberi near the sinus tarsi.                                                                      IMPRESSION:    1. Mild pes planus.  2. Mild bone marrow edema in the medial malleolus likely related to  contusion.  3. There is a 1.5 x 1.9 cm septated cystic lesion in the sinus tarsi  likely a ganglion cyst that does result in mass effect.   4. There may be mild longitudinal splitting of the tibialis posterior  tendon but no evidence of rupture.  5. Mild calcaneocuboid degenerative changes.  6. Mild fluid in the bursa of Gruberi near the sinus tarsi may  represent mild bursitis.    A:  Pronation and posterior tibial tendonitis, sinus tarsi syndrome    P: Reviewed past MRI.  Discussed with patient that increasing pain and swelling may be from tendon tear.  Discussed that without orthotics are wearing ankle brace all the time this foot has to work much harder.  Explained that if she has a tear in the tendon she may need surgery on this repaired.  Briefly discussed surgery and recovery.  Discussed this may be done in conjunction with flatfoot surgery.  We will get MRI for further evaluation and call patient with results.    Jose Anaya, GAL DPM, FACFAS

## 2021-03-05 ENCOUNTER — ANCILLARY PROCEDURE (OUTPATIENT)
Dept: MRI IMAGING | Facility: CLINIC | Age: 61
End: 2021-03-05
Attending: PODIATRIST
Payer: COMMERCIAL

## 2021-03-05 DIAGNOSIS — M76.822 POSTERIOR TIBIAL TENDINITIS OF LEFT LOWER EXTREMITY: ICD-10-CM

## 2021-03-05 PROCEDURE — 73721 MRI JNT OF LWR EXTRE W/O DYE: CPT | Mod: LT | Performed by: RADIOLOGY

## 2021-03-08 ENCOUNTER — MYC MEDICAL ADVICE (OUTPATIENT)
Dept: PODIATRY | Facility: CLINIC | Age: 61
End: 2021-03-08

## 2021-03-11 ENCOUNTER — MYC MEDICAL ADVICE (OUTPATIENT)
Dept: PODIATRY | Facility: CLINIC | Age: 61
End: 2021-03-11

## 2021-03-16 ENCOUNTER — MYC MEDICAL ADVICE (OUTPATIENT)
Dept: PODIATRY | Facility: CLINIC | Age: 61
End: 2021-03-16

## 2021-03-16 NOTE — TELEPHONE ENCOUNTER
Called her this morning     Dr. Anaya    59F hx of DM presenting with CC of cough x 6 weeks. +productive, with white sputum. No fevers, chills, nausea, vomiting. No sick contacts or recent hospitalization. Patient states that she has seen her pmd twice for this cough and given antibiotics with no improvement. No travel. Says that she feels sinus pressure when she coughs, and has had a runny nose. No congestion, sore throat, flu like symptoms.

## 2021-04-16 ENCOUNTER — OFFICE VISIT (OUTPATIENT)
Dept: PODIATRY | Facility: CLINIC | Age: 61
End: 2021-04-16
Attending: PODIATRIST
Payer: COMMERCIAL

## 2021-04-16 ENCOUNTER — ANCILLARY PROCEDURE (OUTPATIENT)
Dept: GENERAL RADIOLOGY | Facility: CLINIC | Age: 61
End: 2021-04-16
Attending: PODIATRIST
Payer: COMMERCIAL

## 2021-04-16 VITALS
DIASTOLIC BLOOD PRESSURE: 72 MMHG | HEIGHT: 61 IN | WEIGHT: 177 LBS | SYSTOLIC BLOOD PRESSURE: 138 MMHG | BODY MASS INDEX: 33.42 KG/M2

## 2021-04-16 DIAGNOSIS — M76.822 POSTERIOR TIBIAL TENDINITIS OF LEFT LOWER EXTREMITY: ICD-10-CM

## 2021-04-16 PROCEDURE — 73610 X-RAY EXAM OF ANKLE: CPT | Mod: LT | Performed by: RADIOLOGY

## 2021-04-16 PROCEDURE — 99214 OFFICE O/P EST MOD 30 MIN: CPT | Performed by: PODIATRIST

## 2021-04-16 PROCEDURE — 82306 VITAMIN D 25 HYDROXY: CPT | Performed by: PODIATRIST

## 2021-04-16 PROCEDURE — 73630 X-RAY EXAM OF FOOT: CPT | Mod: LT | Performed by: RADIOLOGY

## 2021-04-16 PROCEDURE — 36415 COLL VENOUS BLD VENIPUNCTURE: CPT | Performed by: PODIATRIST

## 2021-04-16 ASSESSMENT — MIFFLIN-ST. JEOR: SCORE: 1312.39

## 2021-04-16 NOTE — PATIENT INSTRUCTIONS
"  Muriel to follow up with Primary Care provider regarding elevated blood pressure.    Thank you for choosing [facility] Podiatry / Foot & Ankle Surgery!    DR HATCH'S CLINIC LOCATIONS  [id number]   [place] [address] [place], [place] [id number] [phone]       SET UP SURGERY: [phone]    APPOINTMENTS: [phone]    BILLING QUESTIONS: [phone]    FAX NUMBER: [phone]        Surgical planning.  If you have decided to have surgery, follow these few steps to get the procedure scheduled and to have the proper paperwork filled out.  If you are unsure about surgery, or would like to sit down and further discuss your issue and treatment options, please make a clinic appointment with Dr Hatch.    1.  Pick the date that you would like to have surgery.  Keep in mind that you will likely need at least 2 weeks off after the procedure for proper rest and healing.    2.  Call the surgery scheduling line at [phone] to get the procedure scheduled.  My , April, will assist you in getting surgery set up.      3.  Make an appointment to see Dr Hatch within 1-2 weeks of the date of surgery for your pre-operative consult.  When making the appointment, say \"I need to make a 30 minute surgical consult with Dr Hatch\".  All the paperwork will be ready for you during the visit.  It is recommended that you bring a spouse, family member or friend with you.  There will be lots of information presented.  It can be overwhelming, and it is better to have someone there to help sort out the details.    4.  Make an appointment to see your Primary Care Physician within 4 weeks of the date of surgery for your \"Pre-operative History and Physical\".  This is done to make sure you are medically healthy to undergo surgery.        If you have any post-operative questions regarding your procedure, call our triage nurses at " [phone].

## 2021-04-16 NOTE — PROGRESS NOTES
"Foot & Ankle Surgery   April 16, 2021    S:  Pt is seen today for evaluation of chronic pes planus left lower extremity in setting of posterior tibial tendon dysfunction with PT tendon tear.  She is followed with Dr. Drummond recently, and has seen Dr. Hannah for this in the past.  She has done orthotics and bracing without improvement.  She was referred to me for discussion of surgical intervention.    Vitals:    04/16/21 1424   BP: 138/72   Weight: 80.3 kg (177 lb)   Height: 1.561 m (5' 1.45\")   '      ROS - Pos for CC.  Patient denies current nausea, vomiting, chills, fevers, belly pain, calf pain, chest pain or SOB.  Complete remainder of ROS it otherwise neg.      PE:  Gen:   No apparent distress  Eye:    Visual scanning without deficit  Ear:    Response to auditory stimuli wnl  Lung:    Non-labored breathing on RA noted  Abd:    NTND per patient report  Lymph:    Neg for pitting/non-pitting edema BLE  Vasc:    Pulses palpable, CFT minimally delayed  Neuro:    Light touch sensation intact to all sensory nerve distributions without paresthesias  Derm:    Neg for nodules, lesions or ulcerations  MSK:    Left lower extremity -ankle range of motion less than 0 with knee extended, approximately 5 degrees with knee flexed.  Patient has on weightbearing examination mild calcaneal valgus with forefoot abduction and decrease in medial longitudinal arch height.  On exam she has tenderness along the course of posterior tibial tendon specifically posterior to the medial malleolus.  The rear foot is reducible as is the medial longitudinal arch.  Calf:    Neg for redness, swelling or tenderness      Imaging:  3 views WB L ankle and 4 views WB left foot ordered, awaiting results    Labs:  Vit D draw today      Assessment:  60 year old female with symptomatic pes planovalgus left lower extremity in setting of gastrocsoleus equinus with a posterior tibial tendon tear      Plan:  Discussed etiologies, anatomy and options  1.  " symptomatic pes planovalgus left lower extremity in setting of gastrocsoleus equinus with a posterior tibial tendon tear  -We dispensed a walking cast boot today for an assessment as to how effective this may be as nonsurgical management.  We discussed that this is unlikely to fully alleviate her pain and surgery may certainly still be necessary.  -Surgically we discussed a flatfoot reconstruction including posterior equinus release, PT tendon repair, and joint fusion versus osteotomies for correction of the arch and rear foot.  We discussed a brief generic postoperative course.  -Weightbearing x-rays of the foot and ankle were ordered for today for surgical planning.  -Vitamin D draw ordered today  -Surgery scheduling handout dispensed.  She will follow up as needed for a surgery consult    Job duties; time off work - ; no seated duties  Smoking history - neg  Vit D - draw today  Diabetic/A1c - neg  Hemoglobin - draw prior  Clot history - neg  Allergies to surgical implants/suture - neg  Allergies/issues with narcotics - vomiting with percocet; tolerated norco/vicodin    Procedure(s):  1.  Left lower extremity flatfoot reconstruction  Consent: above  Diagnosis:  Pes planus  Equipment/Vendor: arthrex midfoot fusion set; arthrex cannulated screw sets    Follow up:  prn or sooner with acute issues           Marko Maya DPM FACFAS FACFAOM  Podiatric Foot & Ankle Surgeon  [facility]  [phone]

## 2021-04-16 NOTE — Clinical Note
Good afternoon    I saw Muriel today for her PTTD left lower extremity.  We discussed a reasonable surgical plan going forward for her flatfoot and PT tendon tear, and she'll schedule the procedure at a time convenient for her.    Thanks    Marko

## 2021-04-19 LAB — DEPRECATED CALCIDIOL+CALCIFEROL SERPL-MC: 9 UG/L (ref 20–75)

## 2021-04-23 ENCOUNTER — TELEPHONE (OUTPATIENT)
Dept: PODIATRY | Facility: CLINIC | Age: 61
End: 2021-04-23

## 2021-04-23 ENCOUNTER — PREP FOR PROCEDURE (OUTPATIENT)
Dept: PODIATRY | Facility: CLINIC | Age: 61
End: 2021-04-23

## 2021-04-23 DIAGNOSIS — M21.42 ACQUIRED FLAT FOOT, LEFT: Primary | ICD-10-CM

## 2021-04-23 DIAGNOSIS — Z11.59 ENCOUNTER FOR SCREENING FOR OTHER VIRAL DISEASES: Primary | ICD-10-CM

## 2021-04-23 NOTE — PROGRESS NOTES
"Orders signed for \"left flatfoot reconstruction\".    Supine     Gen + blocks    23-hour admit    Arthrex    Marko Maya DPM [facility] FACFAOM  Podiatric Foot & Ankle Surgeon  [facility]  [phone]      "

## 2021-04-23 NOTE — TELEPHONE ENCOUNTER
Patient called inquiring about surgery for left foot surgery.     Patient is thinking Mid to late June for surgery.      Please place surgery order.       April Borja Surgery Scheduler

## 2021-04-24 ENCOUNTER — HEALTH MAINTENANCE LETTER (OUTPATIENT)
Age: 61
End: 2021-04-24

## 2021-05-04 ENCOUNTER — IMMUNIZATION (OUTPATIENT)
Dept: FAMILY MEDICINE | Facility: CLINIC | Age: 61
End: 2021-05-04
Payer: COMMERCIAL

## 2021-05-04 PROCEDURE — 91301 PR COVID VAC MODERNA 100 MCG/0.5 ML IM: CPT

## 2021-05-04 PROCEDURE — 0011A PR COVID VAC MODERNA 100 MCG/0.5 ML IM: CPT

## 2021-05-10 PROBLEM — M21.42 ACQUIRED FLAT FOOT, LEFT: Status: ACTIVE | Noted: 2021-05-10

## 2021-05-10 NOTE — TELEPHONE ENCOUNTER
Spoke to patient and scheduled surgery.     ATC/Triage: please place covid order.     Type of surgery: left flatfoot reconstruction   Location of surgery: [facility]  Date and time of surgery: 7/14/21  @30   Surgeon: Zulma  Pre-Op Appt Date: 75/21  Post-Op Appt Date: 723/21   Packet sent out: Yes  Pre-cert/Authorization completed:  No  Date: 5/10/21      April Borja Surgery Scheduler

## 2021-06-01 ENCOUNTER — IMMUNIZATION (OUTPATIENT)
Dept: FAMILY MEDICINE | Facility: CLINIC | Age: 61
End: 2021-06-01
Attending: FAMILY MEDICINE
Payer: COMMERCIAL

## 2021-06-01 PROCEDURE — 91301 PR COVID VAC MODERNA 100 MCG/0.5 ML IM: CPT

## 2021-06-01 PROCEDURE — 0012A PR COVID VAC MODERNA 100 MCG/0.5 ML IM: CPT

## 2021-06-28 ENCOUNTER — VIRTUAL VISIT (OUTPATIENT)
Dept: PODIATRY | Facility: CLINIC | Age: 61
End: 2021-06-28
Payer: COMMERCIAL

## 2021-06-28 DIAGNOSIS — M21.42 ACQUIRED FLAT FOOT, LEFT: Primary | ICD-10-CM

## 2021-06-28 DIAGNOSIS — M76.822 POSTERIOR TIBIAL TENDINITIS OF LEFT LOWER EXTREMITY: ICD-10-CM

## 2021-06-28 PROCEDURE — 99214 OFFICE O/P EST MOD 30 MIN: CPT | Mod: 95 | Performed by: PODIATRIST

## 2021-06-28 NOTE — PROGRESS NOTES
Muriel is a 61 year old who is being evaluated via a billable video visit.      How would you like to obtain your AVS? MyChart  If the video visit is dropped, the invitation should be resent by: [phone]        Video Start Time: 7:09  Video-Visit Details    Type of service:  Video Visit    Video End Time:7:27    Originating Location (pt. Location): Home    Distant Location (provider location):  [facility] PODIATRY     Platform used for Video Visit: Maintenance Assistant

## 2021-06-28 NOTE — PATIENT INSTRUCTIONS
Thank you for choosing [facility] Podiatry / Foot & Ankle Surgery!    DR. HATCH'S CLINIC LOCATIONS:   [address] [facility] [address]   [place] [address]                        [place] [address]   [phone]      SET UP SURGERY: [phone]   APPOINTMENTS: [phone]   BILLING QUESTIONS: [phone]   FAX NUMBER: [phone]     FOOT & ANKLE SURGICAL RISKS  Undergoing surgical procedure involves a certain amount of risks. Risks of complications vary, depending on the complexity of the surgery and how you take care of the surgical area during the healing process. Complications can range from minor infection to death. Some complications are temporary while others will be permanent. Your surgeon weighs the risk of complications versus the potential benefit of undergoing the procedure. You need to consider your tolerance for unexpected problems as you decide whether to undergo surgery.    Foot and ankle surgery involves cutting skin, bone, ligaments, blood vessels, and joints. These structures heal well but not without consequence. Any break in the skin can lead to infection. A deep infection can involve bone or joints, which can be devastating. Deep infection can lead to further surgeries such as amputation or could spread to other parts of the body. Most infections are minor and easily treated with oral antibiotics. Infections are often times from bacteria already present on your skin. Proper care of the surgical site is an essential component of avoiding infection. Do not get the bandage wet and take proper care of external pins to avoid these complications.    Joint stiffness is inherent to any foot or ankle surgery. Joint surgery is a major component of reconstructive foot and ankle procedures. The ligaments and tissue around the joint are released for  exposure and/or correction of alignment. Scar tissue limits joint mobility. This can lead to hypersensitivity to touch, pain, problems wearing shoes, and need for revision surgery.    Bones do not always heal after surgery. Poor healing after a bone cut of joint fusion can lead to an extended period of casting, bone stimulators, or repeat surgery. A surgical nonunion is when bones do not heal properly. Smoking will almost always increase your risks of having postoperative complications. So if you smoke, quit now.    Bone grafting is sometimes necessary during the original or repeat surgery. Bone is sometimes taken from other parts of the body, freeze-dried cadaveric bone, or synthetic bone grafts may be used as needed.    BLOOD CLOT PREVENTION & EDUCATION  Foot and ankle surgery can lead to blood clots in the large veins of your legs. This is called a deep venous thrombosis or DVT. A DVT can be life threatening if a portion of the clot breaks away and travels to the lungs. A clot in the lungs is called a pulmonary embolism or PE.    Your risk of developing a DVT is dependent on many factors. Risk factors associated with surgery include the type of surgery you are having (foot and ankle surgery is considered a much lower risk that knee, hip, or abdominal surgery), how long you are in a cast/boot, restricted ambulation, inability to move the ankle, and if you are hospitalized after surgery.    A number of medical conditions also increase your risk of DVT, including diabetes, use of estrogen medications such as birth control pills or postmenopausal medications, obesity, pregnancy, heart failure, cancer, etc.    Other risk factors include heavy smoking, advanced age (over 60 years old, but even those over 40 have increased risk), family of personal history of blood clots or clotting disorders. Symptoms of a DVT in the leg may include swelling, tenderness, a warm feeling or redness. A DVT can occur in both legs even if  only one side is being treated. If you have these symptoms, call your doctor immediately.    Symptoms of a PE include chest pain, shortness of breath or the need to breath rapidly that is not associated with exercise, difficulty breathing, rapid heart rate, a feeling of passing out, and coughing or coughing up blood. A PE is an emergency so you need to be evaluated in the ER immediately if any of these symptoms occur. You could die from a PE. Call 911 right away. PE and DVT can occur without any symptoms in the leg and chest.    Prevention of DVT and PE is important. Your doctor may apply various types of compression to your legs before and after surgery. In addition, high risk patients may be place on a short course of blood thinning medication after surgery.    You can reduce your risk for DVT after surgery by getting up and moving/crawling/crutching around the house once or twice each hour while awake in the first few weeks. Seated range of motion exercises of your ankle and leg may also help. Moving your legs keeps blood flowing through your leg veins and reduced any pooling of blood that may clot. Be sure to follow your doctor's instructions on elevation and weight bearing restrictions.      SURGICAL DRESSING  Your surgical dressing is a sterile dressing and should be left in place until removed by your doctor or their assistant at your first postop visit in clinic. Keep the dressing dry by covering it with a plastic bag during showers, taking baths with your surgical foot hanging outside of the tub, or by sponge bathing. If the dressing does become wet or dirty, call the clinic as it most likely needs to be changed. Do not change it yourself unless told otherwise by your surgeon. The safest plan is to wait to shower for three days after surgery. So take a long shower the morning of surgery.    Do not wear regular shoes with a surgical bandage and/or external pins in your foot. Wear loose fitting clothing that  will easily slide over the dressing. Do not cover your surgical foot with blankets as it can contaminate the dressing. Also, remember to keep it away from your pets as they may try to chew on it.    If your surgeon places external pins in your foot, you must keep your foot dry until the pins are removed at six to eight weeks after surgery. Pins should be covered for protection but still examine the pin sites for loosening, movement, infection, or drainage whenever possible. Do not apply ointment on the pin sites and never push a loose pin back into place.    PRESURGICAL MEDICATION RECOMMENDATIONS  Certain prescription, over-the-counter and herbal medications interfere with healing after an operation. The main concern related to medications that increase bleeding at the surgical site. Excess blood under the incision results in poor wound healing, excess pain, increased scarring, and a higher risk of infection.    Some medications slow the healing process of bone. Medications can also interfere with anesthesia drugs that keep you asleep during the operation. It is important to ensure that these medications are out of your system prior to the operation. The list below details a number of medications that are of concern. Pay special attention to how long you should avoid these medications prior to surgery. Please note that this list is not complete. You should ask your surgeon, pharmacist, or primary care physician if you are uncertain about other medications. Any herbal supplement not listed should be discontinued at least one week prior to surgery.    Aspirin: stop one week prior and may restart the day after surgery. This medication promotes bleeding.  Motrin/Ibuprofen/Aleve/Advil/NSAIDS: stop one week prior to surgery. These medications promote bleeding and may delay bone healing. Avoid these medications at least six weeks postop.  Coumadin: your primary care doctor will manage your coumadin in relation to  surgery.  Enbrel: stop two weeks prior and may restart two weeks after. It may affect soft tissue healing and increase infection risk.  Remicade: stop eight to twelve weeks prior and may restart two weeks after. It can affect soft tissue healing and increase infection risk.  Humera: stop four weeks prior and may restart two weeks after. It can affect soft tissue healing and increase infection risk.  Methotrexate: stop taking on dose prior to surgery. It may be restarted when the wound is healing well.  Kava: stop at least one day prior and may restart one day after. It can cause increased sedative effects of anesthetics.  Ephedra (ma may): stop at least one day prior and may restart one day after. It can increase risk of heart attack or stroke and bleeding at the surgical site.  Zarina's Wort: stop at least five days prior and may restart one day after. It can diminish the effects of medications given during surgery.  Ginseng: stop at least one week prior and may restart one day after. It lowers blood sugar and can increase bleeding at the surgical site.  Ginkgo: stop 36 hours prior and may restart one day after. It can increase bleeding at the surgical site.  Garlic: stop at least one week prior and may restart one day after.  Valerian: slowly decrease the dose over a few weeks prior to avoid withdrawal symptoms. It can increase sedative effects of anesthetics.  Echinacea: no stop/start date. It can be associated with allergic reactions and suppression of your immune system.  Vitamin E/Omgea-3/Fish Oil/Flax/Glucosamine/Chondroitin: stop two weeks prior and may restart one day after. They can increase bleeding at the surgical site.      TIPS FOR SUCCESSFUL POST-OP HEALING  How you care for your surgical site is critically important to achieve successful results. Avoidance of injury, infection, excess swelling, scar tissue, and stiffness are completely dependent on the care you provide over the next six weeks  after surgery.    Your foot requires significant rest and elevation. Sitting for long hours with your foot elevated, however, will create its own problems. Expect muscle aches, back pain, cramps etc. Optimal posture, lumbar support, back exercises, ice, and heat may help with your new aches and pains. DO not apply a heating pad to your foot or leg, as this can worsen pain or swelling. Instead apply ice packs behind the involved knee. Do not apply it directly to the skin.    Narcotic pain medications and inactivity may also cause constipation. Limiting the use of these narcotics will help minimize this problem. The pain medication will not completely alleviate your pain. The purpose of pain pills is to take the edge off and help you get through the first few days. You can substitute extra strength Tylenol if pain is milder. Do not take Tylenol and prescription pain pills at the same time. Do not take more than 4,000mg of Tylenol in 24 hours. You can also minimize constipation by drinking plenty of water, eating lots of fruits and veggies, and taking the appropriate amount of Metamucil or other fiber supplements. These measures should be continued while on narcotic pain medications and if you remain inactive.    Showering can be a major challenge after surgery. Your incision requires about three days to become sealed from water. Your bandage should not get wet or removed. Attempt to avoid showing for three days after surgery and try a sponge bath instead. It can be dangerous showering while standing on only one foot so be careful. Consider borrowing a shower chair. If the bandage does get wet, call us immediately for a dressing change as this can slow the healing process or cause infection.    External pins need to be kept dry without ointment or moisture. Keep them covered at all times. Protect them from clothing, blankets, and pets.  Any change or movement of the pins deserves a call to clinic. A loose pin will need  to be removed. Never push them back into your foot.    Stiffness will develop after any operation due to scarring. The scar tissue begins to form immediately after the surgery. Inactivity can cause excess stiffness and may lead to blood clots, scar tissue, and adhesions.        SCAR CARE  Scarring is an unfortunate but unavoidable part of surgery. Every person scars differently and there is no way to predict how an individual's scar will look. Once the sutures are removed, there are a few things you can do to help minimize the scar tissue formation.    As soon as the skin is incised during surgery, the body is taking steps to prepare for healing. After about three days, the body has sent cells to the incision to begin the healing process. These cells, called fibroblasts, make collagen, a protein in the skin that helps provide strength. Once the skin has been sufficiently strengthened, the sutures are removed. Over the next year, the body synthesizes new collagen and breaks down old collagen to help achieve a strong scar that allows the foot and ankle to function appropriately. This is where patients can help the appearance of the scare, as it will change over the next year.    1. Do not expose the scar to the sun for one year.  2. Wear shoes/socks or cover your scar with zinc oxide  3. Any sun exposure can permanently darken the scar  4. Massage the scar 2-3 times a day to break down the collagen  5. Apply lotion/Vitamin E on the scar using some pressure  6. Try over the counter products like Mederma/Scar Zone    SHOWERING BEFORE SURGERY  You must wash with the soap twice before coming to the hospital for your surgery. This will decrease bacteria (germs) on your skin. It will also help reduce your chance of infection (illness caused by germs) after surgery.  Read the directions and safety tips on the bottle of soap. Wash once the evening before surgery and once the morning of surgery. Use 4 ounces of soap each time.  When showering, it is best to use 2 fresh washcloths and a fresh towel.   Items you will need for showerin newly washed washcloths    2 newly washed towels    8 ounces of soap  FOLLOW THESE INSTRUCTIONS:  The evening before surgery   1. Shower or bathe as you normally would, and use your regular soap and a clean washcloth. Give special attention to places where your incision (surgical cut) or catheters will be. This includes your groin area. Rinse well. You may wash your hair with your regular shampoo.  2. Next, wash your entire body from your chin down with the antiseptic soap. See the next page for directions about how to do this.  3. Rinse well and dry off using a newly washed towel.  The morning of surgery  Repeat steps 1, 2 and 3.   Other suggestions:    Wear freshly washed pajamas or clothing after your evening shower.    Wear freshly washed clothes the day of surgery.    Wash and change your bed sheets the day before surgery to have clean bed sheets after you shower and when you get home from surgery.    If you have trouble washing all areas, make sure someone helps you.    Don t use any deodorant, lotion or powder after your shower.    Women who are menstruating should wear a fresh sanitary pad to the hospital.    Hibiclens - 4% CHG  1. Put about 1 ounce of soap onto a clean, damp washcloth. Wash your skin from your chin down to your toes. Pay special attention to your incision areas.  2. Gently rub your incision area and surrounding areas.  3. Repeat steps 1 and 2 until you have used 4 ounces. Make sure you gently rub your incision area and surrounding areas for a full 5 minutes.   4. Rinse with water and towel dry with a clean towel.       * If you have any post-operative questions regarding your procedure, call our triage team at the [facility] at [phone] (option 3).

## 2021-06-28 NOTE — PROGRESS NOTES
Foot & Ankle Surgery   June 28, 2021    S:  Video visit for surgical consultationfor left lower extremity pes planus in setting of PT tendon rupture.    There were no vitals filed for this visit.  There is no height or weight on file to calculate BMI.    ROS - Pos for CC.  Patient denies current nausea, vomiting, chills, fevers, belly pain, calf pain, chest pain or SOB.  Complete remainder of ROS is otherwise neg.      PE:  Lymph:    Neg for pitting/non-pitting edema BLE  Vasc:    Pulses palpable, CFT minimally delayed  Neuro:    Light touch sensation intact to all sensory nerve distributions without paresthesias  Derm:    Neg for nodules, lesions or ulcerations  MSK:    Left lower extremity -ankle range of motion less than 0 with knee extended, approximately 5 degrees with knee flexed.  Patient has on weightbearing examination mild calcaneal valgus with forefoot abduction and decrease in medial longitudinal arch height.  On exam she has tenderness along the course of posterior tibial tendon specifically posterior to the medial malleolus.  The rear foot is reducible as is the medial longitudinal arch.  Calf:    Neg for redness, swelling or tenderness    Imaging:  IMPRESSION: Plantar calcaneal spurring. Pes planus. No fracture identified.    Labs:     Vitamin D Deficiency screening 20 - 75 ug/L 9Low           Assessment:  61 year old female pes planus in setting of PT tendon rupture    Plan:  The surgical procedure(s) was discussed in detail today.  Risks that were discussed include, but are not limited to, infection, wound healing complications, temporary or permanent nerve damage/numbness, painful scarring, possible recurrence of/new deformity, over-correction, under-correction, possible abnormal bone healing, fixation/hardware failure, continued or new pain, the need to revise the procedure, as well as blood clots, limb loss, pain syndrome and death.  After a discussion of the procedure, risks, and post-operative  care and course, the patient has elected to forego further non-operative management in favor of surgical intervention.  No guarantees were given.  The patient was informed that swelling and generalized discomfort can persist for months, and full recovery can often take up to a year.  I anticipate discontinuation of prescription pain medication at/shortly after suture removal.    Diagnosis:  above  Procedure:  Flatfoot reconstruction - gastroc, T-N fusion, Triangle/Echevarria  Activity Level:  NWB  Pain Management:  MS contin, norco, atarax, ibuprofen  DVT prophylaxis:  Lovenox x 2 weeks, followed by SIN23mx BID.  Patient instructed on ankle ROM/calf massage exercises; patient advised on early frequent ambulation  Allergies:     Allergies   Allergen Reactions     Aspirin      Mouth lesions      Dispo:  Would like to go home after  WB assistive device:  Boot, crutches  Smoking:  neg  Vit D status:  9.  Taking 5000U daily now  Clot/bleeding disorder history:   neg  Job duties/anticipated time off:  2 weeks min    Billing today is based on a time of >30 minutes, more than 50% of which was spent on counseling and coordinating care, including chart review(pertinent imaging, clinical encounters) and documentation.      Marko Maya DPM FACFAS FACFAOM  Podiatric Foot & Ankle Surgeon  [facility]  [phone]    Disclaimer: This note consists of symbols derived from keyboarding, dictation and/or voice recognition software. As a result, there may be errors in the script that have gone undetected. Please consider this when interpreting information found in this chart.

## 2021-07-05 ENCOUNTER — OFFICE VISIT (OUTPATIENT)
Dept: FAMILY MEDICINE | Facility: CLINIC | Age: 61
End: 2021-07-05
Payer: COMMERCIAL

## 2021-07-05 VITALS
TEMPERATURE: 97.9 F | WEIGHT: 170.4 LBS | HEART RATE: 56 BPM | DIASTOLIC BLOOD PRESSURE: 64 MMHG | RESPIRATION RATE: 16 BRPM | SYSTOLIC BLOOD PRESSURE: 114 MMHG | BODY MASS INDEX: 31.73 KG/M2

## 2021-07-05 DIAGNOSIS — E55.9 VITAMIN D DEFICIENCY: ICD-10-CM

## 2021-07-05 DIAGNOSIS — M21.42 FLAT FEET: ICD-10-CM

## 2021-07-05 DIAGNOSIS — Z01.818 PREOP GENERAL PHYSICAL EXAM: Primary | ICD-10-CM

## 2021-07-05 DIAGNOSIS — E03.4 HYPOTHYROIDISM DUE TO ACQUIRED ATROPHY OF THYROID: ICD-10-CM

## 2021-07-05 DIAGNOSIS — M21.41 FLAT FEET: ICD-10-CM

## 2021-07-05 LAB
ERYTHROCYTE [DISTWIDTH] IN BLOOD BY AUTOMATED COUNT: 12.6 % (ref 10–15)
HCT VFR BLD AUTO: 39.2 % (ref 35–47)
HGB BLD-MCNC: 13.1 G/DL (ref 11.7–15.7)
MCH RBC QN AUTO: 29.2 PG (ref 26.5–33)
MCHC RBC AUTO-ENTMCNC: 33.4 G/DL (ref 31.5–36.5)
MCV RBC AUTO: 88 FL (ref 78–100)
PLATELET # BLD AUTO: 216 10E9/L (ref 150–450)
RBC # BLD AUTO: 4.48 10E12/L (ref 3.8–5.2)
T4 FREE SERPL-MCNC: 1.3 NG/DL (ref 0.76–1.46)
TSH SERPL DL<=0.005 MIU/L-ACNC: 0.15 MU/L (ref 0.4–4)
WBC # BLD AUTO: 4.6 10E9/L (ref 4–11)

## 2021-07-05 PROCEDURE — 36415 COLL VENOUS BLD VENIPUNCTURE: CPT | Performed by: PHYSICIAN ASSISTANT

## 2021-07-05 PROCEDURE — 85027 COMPLETE CBC AUTOMATED: CPT | Performed by: PHYSICIAN ASSISTANT

## 2021-07-05 PROCEDURE — 84439 ASSAY OF FREE THYROXINE: CPT | Performed by: PHYSICIAN ASSISTANT

## 2021-07-05 PROCEDURE — 84443 ASSAY THYROID STIM HORMONE: CPT | Performed by: PHYSICIAN ASSISTANT

## 2021-07-05 PROCEDURE — 99214 OFFICE O/P EST MOD 30 MIN: CPT | Performed by: PHYSICIAN ASSISTANT

## 2021-07-05 ASSESSMENT — PAIN SCALES - GENERAL: PAINLEVEL: MILD PAIN (3)

## 2021-07-05 NOTE — PATIENT INSTRUCTIONS
In 3 months start OTC vitamin D, 5,000 units every other day.    Preparing for Your Surgery  Getting started  A nurse will call you to review your health history and instructions. They will give you an arrival time based on your scheduled surgery time.  Please be ready to share the following:    Your doctor's clinic name and phone number    Your medical, surgical and anesthesia history    A list of allergies and sensitivities    A list of medicines, including herbal treatments and over-the-counter drugs    Whether the patient has a legal guardian (ask how to send us the papers in advance)  If you have a child who's having surgery, please ask for a copy of Preparing for Your Child's Surgery.    Preparing for surgery    Within 30 days of surgery: Have a pre-op exam (sometimes called an H&P, or History and Physical). This can be done at a clinic or pre-operative center.  ? If you're having a , you may not need this exam. Talk to your care team    At your pre-op exam, talk to your care team about all medicines you take. If you need to stop any medicines before surgery, ask when to start taking them again.  ? We do this for your safety. Many medicines can make you bleed too much during surgery. Some change how well surgery (anesthesia) drugs work.    Call your insurance company to let them know you're having surgery. (If you don't have insurance, call [phone].)    Call your clinic if there's any change in your health. This includes signs of a cold or flu (sore throat, runny nose, cough, rash, fever). It also includes a scrape or scratch near the surgery site.    If you have questions on the day of surgery, call your hospital or surgery center.  Eating and drinking guidelines  For your safety: Unless your surgeon tells you otherwise, follow the guidelines below.    Eat and drink as usual until 8 hours before surgery. After that, no food or milk.    Drink clear liquids until 2 hours before surgery. These are  liquids you can see through, like water, Gatorade and Propel Water. You may also have black coffee and tea (no cream or milk).    Nothing by mouth within 2 hours of surgery. This includes gum, candy and breath mints.    If you drink, stop drinking alcohol the night before surgery.    If your care team tells you to take medicine on the morning of surgery, it's okay to take it with a sip of water.  Preventing infection    Shower or bathe the night before and morning of your surgery. Follow the instructions your clinic gave you. (If no instructions, use regular soap.)    Don't shave or clip hair near your surgery site. We'll remove the hair if needed.    Don't smoke or vape the morning of surgery. You may chew nicotine gum up to 2 hours before surgery. A nicotine patch is okay.  ? Note: Some surgeries require you to completely quit smoking and nicotine. Check with your surgeon.    Your care team will make every effort to keep you safe from infection. We will:  ? Clean our hands often with soap and water (or an alcohol-based hand rub).  ? Clean the skin at your surgery site with a special soap that kills germs.  ? Give you a special gown to keep you warm. (Cold raises the risk of infection.)  ? Wear special hair covers, masks, gowns and gloves during surgery.  ? Give antibiotic medicine, if prescribed. Not all surgeries need antibiotics.  What to bring on the day of surgery    Photo ID and insurance card    Copy of your health care directive, if you have one    Glasses and hearing aides (bring cases)  ? You can't wear contacts during surgery    Inhaler and eye drops, if you use them (tell us about these when you arrive)    CPAP machine or breathing device, if you use them    A few personal items, if spending the night    If you have . . .  ? A pacemaker or ICD (cardiac defibrillator): Bring the ID card.  ? An implanted stimulator: Bring the remote control.  ? A legal guardian: Bring a copy of the certified  (court-stamped) guardianship papers.  Please remove any jewelry, including body piercings. Leave jewelry and other valuables at home.  If you're going home the day of surgery  Important: If you don't follow the rules below, we must cancel your surgery.     Arrange for someone to drive you home after surgery. You may not drive, take a taxi or take public transportation by yourself (unless you'll have local anesthesia only).    Arrange for a responsible adult to stay with you overnight. If you don't, we may keep you in the hospital overnight, and you may need to pay the costs yourself.  Questions?   If you have any questions for your care team, list them here: _________________________________________________________________________________________________________________________________________________________________________________________________________________________________________________________________________________________________________________________  For informational purposes only. Not to replace the advice of your health care provider. Copyright   2003, 2019 [place] [employer]. All rights reserved. Clinically reviewed by Rhoda Wang MD. [employer] [id number] - REV 4/20.

## 2021-07-05 NOTE — PROGRESS NOTES
[facility]  [address]  [place] [address]  Phone: [phone]  Primary Provider: Shruthi Osorio  Pre-op Performing Provider: SHRUTHI OSORIO      PREOPERATIVE EVALUATION:  Today's date: 7/5/2021    Muriel Porras is a 61 year old female who presents for a preoperative evaluation.    Surgical Information:  Surgery/Procedure: Left Flatfoot Reconstruction  Surgery Location: [facility]  Surgeon: Dr. Maya  Surgery Date: 7/14/21  Time of Surgery: 7:30 am  Where patient plans to recover: At home with family  Fax number for surgical facility: Note does not need to be faxed, will be available electronically in Epic.    Type of Anesthesia Anticipated: Combined General with Popliteal Block    Assessment & Plan     The proposed surgical procedure is considered INTERMEDIATE risk.    1. Preop general physical exam    2. Flat feet    3. Hypothyroidism due to acquired atrophy of thyroid    4. Vitamin D deficiency        3) Repeat TSH today, stable.    4) Start high dose vit D, then continue OTC. Recheck vitamin D level in 6 months.        Risks and Recommendations:  The patient has the following additional risks and recommendations for perioperative complications:   - No identified additional risk factors other than previously addressed    Medication Instructions:  She will hold her medication the day of surgery.    RECOMMENDATION:  APPROVAL GIVEN to proceed with proposed procedure, without further diagnostic evaluation.    Review of the result(s) of each unique test - TSH, CBC          Subjective     HPI related to upcoming procedure: Flat foot, left    Preop Questions 7/1/2021   1. Have you ever had a heart attack or stroke? No   2. Have you ever had surgery on your heart or blood vessels, such as a stent placement, a coronary artery bypass, or surgery on an artery in your head, neck, heart, or legs? No   3. Do you have chest pain with activity?  No   4. Do you have a history of  heart failure? No   5. Do you currently have a cold, bronchitis or symptoms of other infection? No   6. Do you have a cough, shortness of breath, or wheezing? No   7. Do you or anyone in your family have previous history of blood clots? No   8. Do you or does anyone in your family have a serious bleeding problem such as prolonged bleeding following surgeries or cuts? No   9. Have you ever had problems with anemia or been told to take iron pills? YES - 15+ years ago   10. Have you had any abnormal blood loss such as black, tarry or bloody stools, or abnormal vaginal bleeding? No   11. Have you ever had a blood transfusion? No   12. Are you willing to have a blood transfusion if it is medically needed before, during, or after your surgery? Yes   13. Have you or any of your relatives ever had problems with anesthesia? UNKNOWN    14. Do you have sleep apnea, excessive snoring or daytime drowsiness? No   15. Do you have any artifical heart valves or other implanted medical devices like a pacemaker, defibrillator, or continuous glucose monitor? No   16. Do you have artificial joints? No   17. Are you allergic to latex? No       Health Care Directive:  Patient does not have a Health Care Directive or Living Will: Discussed advance care planning with patient; however, patient declined at this time.    Preoperative Review of :   reviewed - no record of controlled substances prescribed.      Status of Chronic Conditions:  See problem list for active medical problems.  Problems all longstanding and stable, except as noted/documented.  See ROS for pertinent symptoms related to these conditions.      Review of Systems  Constitutional, neuro, ENT, endocrine, pulmonary, cardiac, gastrointestinal, genitourinary, musculoskeletal, integument and psychiatric systems are negative, except as otherwise noted.    Patient Active Problem List    Diagnosis Date Noted     Acquired flat foot, left  05/10/2021     Priority: Medium     Added automatically from request for surgery [id number]       Osteopenia of both hips 07/15/2019     Priority: Medium     Hypothyroidism due to acquired atrophy of thyroid 2016     Priority: Medium      Past Medical History:   Diagnosis Date     Hypothyroidism (acquired)      Past Surgical History:   Procedure Laterality Date     COLONOSCOPY WITH CO2 INSUFFLATION N/A 2019    Procedure: COLONOSCOPY, WITH CO2 INSUFFLATION;  Surgeon: Marshall Kaba DO;  Location: [facility] OR     Current Outpatient Medications   Medication Sig Dispense Refill     dexamethasone (DECADRON) 0.5 MG/5ML elixir Swish 5ml in your mouth for 5 minutes 3 times daily as needed and then spit out. Avoid eating or drinking for 30 minutes after use. For aphthous ulcers. 474 mL 0     levothyroxine (SYNTHROID/LEVOTHROID) 175 MCG tablet Take 1 tablet (175 mcg) by mouth daily 30 tablet 1     meclizine (ANTIVERT) 25 MG tablet Take 1 tablet (25 mg) by mouth 3 times daily as needed for dizziness 30 tablet 0     vitamin D3 (CHOLECALCIFEROL) 1.25 MG (81911 UT) capsule Take 1 capsule (50,000 Units) by mouth every 7 days 12 capsule 0     Vitamin D3 (CHOLECALCIFEROL) 125 MCG (5000 UT) tablet Take by mouth daily         Allergies   Allergen Reactions     Aspirin      Mouth lesions         Social History     Tobacco Use     Smoking status: Former Smoker     Packs/day: 1.00     Types: Cigarettes     Quit date: 2016     Years since quittin.2     Smokeless tobacco: Never Used   Substance Use Topics     Alcohol use: Yes     Alcohol/week: 0.0 standard drinks     Comment: rare     Family History   Problem Relation Age of Onset     Colon Cancer Maternal Grandfather      Cerebrovascular Disease Mother 84     Myocardial Infarction Father         Stents     Pacemaker Father      Hypothyroidism Father      Hypothyroidism Sister      Cancer Sister      No Known Problems Brother      Colon Polyps Daughter      Hypothyroidism  Sister      No Known Problems Sister      History   Drug Use No         Objective     /64 (BP Location: Left arm, Patient Position: Sitting, Cuff Size: Adult Large)   Pulse 56   Temp 97.9  F (36.6  C) (Tympanic)   Resp 16   Wt 77.3 kg (170 lb 6.4 oz)   LMP 09/01/2014   BMI 31.73 kg/m      Physical Exam    GENERAL APPEARANCE: healthy, alert and no distress     EYES: EOMI, PERRL     HENT: ear canals and TM's normal and nose and mouth without ulcers or lesions     NECK: no adenopathy, no asymmetry, masses, or scars and thyroid normal to palpation     RESP: lungs clear to auscultation - no rales, rhonchi or wheezes     CV: regular rates and rhythm, normal S1 S2, no S3 or S4 and no murmur, click or rub     ABDOMEN:  soft, nontender, no HSM or masses and bowel sounds normal     MS: extremities normal- no gross deformities noted, no evidence of inflammation in joints, FROM in all extremities.     SKIN: no suspicious lesions or rashes     NEURO: Normal strength and tone, sensory exam grossly normal, mentation intact and speech normal     PSYCH: mentation appears normal. and affect normal/bright     LYMPHATICS: No cervical adenopathy    Recent Labs   Lab Test 09/30/20  1031 01/06/20  1449   HGB 14.9 16.5*    160     --    POTASSIUM 4.1  --    CR 0.63  --         Diagnostics:  Recent Results (from the past 168 hour(s))   TSH with free T4 reflex    Collection Time: 07/05/21  7:33 AM   Result Value Ref Range    TSH 0.15 (L) 0.40 - 4.00 mU/L   CBC with platelets    Collection Time: 07/05/21  7:33 AM   Result Value Ref Range    WBC 4.6 4.0 - 11.0 10e9/L    RBC Count 4.48 3.8 - 5.2 10e12/L    Hemoglobin 13.1 11.7 - 15.7 g/dL    Hematocrit 39.2 35.0 - 47.0 %    MCV 88 78 - 100 fl    MCH 29.2 26.5 - 33.0 pg    MCHC 33.4 31.5 - 36.5 g/dL    RDW 12.6 10.0 - 15.0 %    Platelet Count 216 150 - 450 10e9/L   T4 free    Collection Time: 07/05/21  7:33 AM   Result Value Ref Range    T4 Free 1.30 0.76 - 1.46 ng/dL         No EKG required, no history of coronary heart disease, significant arrhythmia, peripheral arterial disease or other structural heart disease.   EKG done on 6/30/20: Normal sinus rhythm    Revised Cardiac Risk Index (RCRI):  The patient has the following serious cardiovascular risks for perioperative complications:   - No serious cardiac risks = 0 points     RCRI Interpretation: 0 points: Class I (very low risk - 0.4% complication rate)       Signed Electronically by: Shruthi Osorio PA-C  Copy of this evaluation report is provided to requesting physician.

## 2021-07-05 NOTE — H&P (VIEW-ONLY)
[facility]  [address]  [place] [address]  Phone: [phone]  Primary Provider: Shruthi Osorio  Pre-op Performing Provider: SHRUTHI OSORIO      PREOPERATIVE EVALUATION:  Today's date: 7/5/2021    Muriel Porras is a 61 year old female who presents for a preoperative evaluation.    Surgical Information:  Surgery/Procedure: Left Flatfoot Reconstruction  Surgery Location: [facility]  Surgeon: Dr. Maya  Surgery Date: 7/14/21  Time of Surgery: 7:30 am  Where patient plans to recover: At home with family  Fax number for surgical facility: Note does not need to be faxed, will be available electronically in Epic.    Type of Anesthesia Anticipated: Combined General with Popliteal Block    Assessment & Plan     The proposed surgical procedure is considered INTERMEDIATE risk.    1. Preop general physical exam    2. Flat feet    3. Hypothyroidism due to acquired atrophy of thyroid    4. Vitamin D deficiency        3) Repeat TSH today, stable.    4) Start high dose vit D, then continue OTC. Recheck vitamin D level in 6 months.        Risks and Recommendations:  The patient has the following additional risks and recommendations for perioperative complications:   - No identified additional risk factors other than previously addressed    Medication Instructions:  She will hold her medication the day of surgery.    RECOMMENDATION:  APPROVAL GIVEN to proceed with proposed procedure, without further diagnostic evaluation.    Review of the result(s) of each unique test - TSH, CBC          Subjective     HPI related to upcoming procedure: Flat foot, left    Preop Questions 7/1/2021   1. Have you ever had a heart attack or stroke? No   2. Have you ever had surgery on your heart or blood vessels, such as a stent placement, a coronary artery bypass, or surgery on an artery in your head, neck, heart, or legs? No   3. Do you have chest pain with activity?  No   4. Do you have a history of  heart failure? No   5. Do you currently have a cold, bronchitis or symptoms of other infection? No   6. Do you have a cough, shortness of breath, or wheezing? No   7. Do you or anyone in your family have previous history of blood clots? No   8. Do you or does anyone in your family have a serious bleeding problem such as prolonged bleeding following surgeries or cuts? No   9. Have you ever had problems with anemia or been told to take iron pills? YES - 15+ years ago   10. Have you had any abnormal blood loss such as black, tarry or bloody stools, or abnormal vaginal bleeding? No   11. Have you ever had a blood transfusion? No   12. Are you willing to have a blood transfusion if it is medically needed before, during, or after your surgery? Yes   13. Have you or any of your relatives ever had problems with anesthesia? UNKNOWN    14. Do you have sleep apnea, excessive snoring or daytime drowsiness? No   15. Do you have any artifical heart valves or other implanted medical devices like a pacemaker, defibrillator, or continuous glucose monitor? No   16. Do you have artificial joints? No   17. Are you allergic to latex? No       Health Care Directive:  Patient does not have a Health Care Directive or Living Will: Discussed advance care planning with patient; however, patient declined at this time.    Preoperative Review of :   reviewed - no record of controlled substances prescribed.      Status of Chronic Conditions:  See problem list for active medical problems.  Problems all longstanding and stable, except as noted/documented.  See ROS for pertinent symptoms related to these conditions.      Review of Systems  Constitutional, neuro, ENT, endocrine, pulmonary, cardiac, gastrointestinal, genitourinary, musculoskeletal, integument and psychiatric systems are negative, except as otherwise noted.    Patient Active Problem List    Diagnosis Date Noted     Acquired flat foot, left  05/10/2021     Priority: Medium     Added automatically from request for surgery [id number]       Osteopenia of both hips 07/15/2019     Priority: Medium     Hypothyroidism due to acquired atrophy of thyroid 2016     Priority: Medium      Past Medical History:   Diagnosis Date     Hypothyroidism (acquired)      Past Surgical History:   Procedure Laterality Date     COLONOSCOPY WITH CO2 INSUFFLATION N/A 2019    Procedure: COLONOSCOPY, WITH CO2 INSUFFLATION;  Surgeon: Marshall Kaba DO;  Location: [facility] OR     Current Outpatient Medications   Medication Sig Dispense Refill     dexamethasone (DECADRON) 0.5 MG/5ML elixir Swish 5ml in your mouth for 5 minutes 3 times daily as needed and then spit out. Avoid eating or drinking for 30 minutes after use. For aphthous ulcers. 474 mL 0     levothyroxine (SYNTHROID/LEVOTHROID) 175 MCG tablet Take 1 tablet (175 mcg) by mouth daily 30 tablet 1     meclizine (ANTIVERT) 25 MG tablet Take 1 tablet (25 mg) by mouth 3 times daily as needed for dizziness 30 tablet 0     vitamin D3 (CHOLECALCIFEROL) 1.25 MG (83637 UT) capsule Take 1 capsule (50,000 Units) by mouth every 7 days 12 capsule 0     Vitamin D3 (CHOLECALCIFEROL) 125 MCG (5000 UT) tablet Take by mouth daily         Allergies   Allergen Reactions     Aspirin      Mouth lesions         Social History     Tobacco Use     Smoking status: Former Smoker     Packs/day: 1.00     Types: Cigarettes     Quit date: 2016     Years since quittin.2     Smokeless tobacco: Never Used   Substance Use Topics     Alcohol use: Yes     Alcohol/week: 0.0 standard drinks     Comment: rare     Family History   Problem Relation Age of Onset     Colon Cancer Maternal Grandfather      Cerebrovascular Disease Mother 84     Myocardial Infarction Father         Stents     Pacemaker Father      Hypothyroidism Father      Hypothyroidism Sister      Cancer Sister      No Known Problems Brother      Colon Polyps Daughter      Hypothyroidism  Sister      No Known Problems Sister      History   Drug Use No         Objective     /64 (BP Location: Left arm, Patient Position: Sitting, Cuff Size: Adult Large)   Pulse 56   Temp 97.9  F (36.6  C) (Tympanic)   Resp 16   Wt 77.3 kg (170 lb 6.4 oz)   LMP 09/01/2014   BMI 31.73 kg/m      Physical Exam    GENERAL APPEARANCE: healthy, alert and no distress     EYES: EOMI, PERRL     HENT: ear canals and TM's normal and nose and mouth without ulcers or lesions     NECK: no adenopathy, no asymmetry, masses, or scars and thyroid normal to palpation     RESP: lungs clear to auscultation - no rales, rhonchi or wheezes     CV: regular rates and rhythm, normal S1 S2, no S3 or S4 and no murmur, click or rub     ABDOMEN:  soft, nontender, no HSM or masses and bowel sounds normal     MS: extremities normal- no gross deformities noted, no evidence of inflammation in joints, FROM in all extremities.     SKIN: no suspicious lesions or rashes     NEURO: Normal strength and tone, sensory exam grossly normal, mentation intact and speech normal     PSYCH: mentation appears normal. and affect normal/bright     LYMPHATICS: No cervical adenopathy    Recent Labs   Lab Test 09/30/20  1031 01/06/20  1449   HGB 14.9 16.5*    160     --    POTASSIUM 4.1  --    CR 0.63  --         Diagnostics:  Recent Results (from the past 168 hour(s))   TSH with free T4 reflex    Collection Time: 07/05/21  7:33 AM   Result Value Ref Range    TSH 0.15 (L) 0.40 - 4.00 mU/L   CBC with platelets    Collection Time: 07/05/21  7:33 AM   Result Value Ref Range    WBC 4.6 4.0 - 11.0 10e9/L    RBC Count 4.48 3.8 - 5.2 10e12/L    Hemoglobin 13.1 11.7 - 15.7 g/dL    Hematocrit 39.2 35.0 - 47.0 %    MCV 88 78 - 100 fl    MCH 29.2 26.5 - 33.0 pg    MCHC 33.4 31.5 - 36.5 g/dL    RDW 12.6 10.0 - 15.0 %    Platelet Count 216 150 - 450 10e9/L   T4 free    Collection Time: 07/05/21  7:33 AM   Result Value Ref Range    T4 Free 1.30 0.76 - 1.46 ng/dL         No EKG required, no history of coronary heart disease, significant arrhythmia, peripheral arterial disease or other structural heart disease.   EKG done on 6/30/20: Normal sinus rhythm    Revised Cardiac Risk Index (RCRI):  The patient has the following serious cardiovascular risks for perioperative complications:   - No serious cardiac risks = 0 points     RCRI Interpretation: 0 points: Class I (very low risk - 0.4% complication rate)       Signed Electronically by: Shruthi Osorio PA-C  Copy of this evaluation report is provided to requesting physician.

## 2021-07-08 ENCOUNTER — TELEPHONE (OUTPATIENT)
Dept: PODIATRY | Facility: CLINIC | Age: 61
End: 2021-07-08

## 2021-07-08 NOTE — TELEPHONE ENCOUNTER
Rosa is calling again because she has still not received the paperwork the was supposed to be faxed this morning. Her HR person needs this.     Please call back.

## 2021-07-08 NOTE — TELEPHONE ENCOUNTER
Reason for call:  Rosa is following up on paperwork faxed from Steffanie to her that she has not received. Says she has a different Fax# to try but did not provide in the voicemail message.  Please call back.    Phone number to reach patient:  Cell number on file:    Telephone Information:   Mobile [phone]       Best Time:  NA    Can we leave a detailed message on this number?  NO

## 2021-07-08 NOTE — TELEPHONE ENCOUNTER
Reason for Call:  Form, our goal is to have forms completed with 7 days, however, some forms may require a visit or additional information.    Type of letter, form or note:  LA     Who is the form from?: Patient    Where did the form come from: form was faxed in by patient    Phone number of person requesting form: [phone] (patient cell)  Can we leave a detailed message on this number:  YES    Desired completion date of form: asap      How will form be returned?:  2 sets of forms received - 1.  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 [employer] Claims : fax to [phone]    Has the patient signed a consent form for release of information (may be included with form)? YES    Additional comments: DOS 7/14/21    Forms were completed by Dr. Maya.    Phone call to patient. Spoke with patient and informed her forms were completed and will be sent to their respective parties this morning. She verbalized understanding and was appreciative of call. Patient had requested that the first set of forms be emailed to Steff Roberts. Informed her that we cannot email it since we cannot properly secure her PHI. She provided her public work fax number ([phone]) and will watch for it closely. She will call back in ~ 10 minutes if she has not received it yet.     Forms faxed. Copy sent to scan.    Steffanie Rasmay MBA, ATC

## 2021-07-09 NOTE — TELEPHONE ENCOUNTER
Return call to patient. Spoke with patient who states that after she called yesterday afternoon she did receive the forms through fax. She has no further questions or concerns at this time. She was appreciative of call back.    Steffanie Ramsay MBA, ATC

## 2021-07-09 NOTE — TELEPHONE ENCOUNTER
Routed to Steffanie who assisted Dr Maya yesterday. I looked an did not see the forms at [facility] yesterday.

## 2021-07-12 DIAGNOSIS — Z11.59 ENCOUNTER FOR SCREENING FOR OTHER VIRAL DISEASES: ICD-10-CM

## 2021-07-12 LAB — SARS-COV-2 RNA RESP QL NAA+PROBE: NEGATIVE

## 2021-07-12 PROCEDURE — U0005 INFEC AGEN DETEC AMPLI PROBE: HCPCS

## 2021-07-12 PROCEDURE — U0003 INFECTIOUS AGENT DETECTION BY NUCLEIC ACID (DNA OR RNA); SEVERE ACUTE RESPIRATORY SYNDROME CORONAVIRUS 2 (SARS-COV-2) (CORONAVIRUS DISEASE [COVID-19]), AMPLIFIED PROBE TECHNIQUE, MAKING USE OF HIGH THROUGHPUT TECHNOLOGIES AS DESCRIBED BY CMS-2020-01-R: HCPCS

## 2021-07-13 ENCOUNTER — ANESTHESIA EVENT (OUTPATIENT)
Dept: SURGERY | Facility: CLINIC | Age: 61
End: 2021-07-13
Payer: COMMERCIAL

## 2021-07-13 RX ORDER — LEVOTHYROXINE SODIUM 175 UG/1
175 TABLET ORAL EVERY MORNING
COMMUNITY
End: 2022-05-17

## 2021-07-13 RX ORDER — ACETAMINOPHEN 500 MG
500-1000 TABLET ORAL EVERY 6 HOURS PRN
Status: ON HOLD | COMMUNITY
End: 2021-07-14

## 2021-07-13 RX ORDER — MECLIZINE HYDROCHLORIDE 25 MG/1
25 TABLET ORAL 3 TIMES DAILY PRN
COMMUNITY
End: 2022-07-14

## 2021-07-13 NOTE — PROGRESS NOTES
PTA medications updated by Medication Scribe prior to surgery via phone call with patient (last doses completed by Nurse)     Medication history sources: Patient, Surescripts and H&P  In the past week, patient estimated taking medication this percent of the time: Greater than 90%  Adherence assessment: N/A Not Observed    Significant changes made to the medication list:  None      Additional medication history information:   None    Medication reconciliation completed by provider prior to medication history? No    Time spent in this activity: 20 minutes    The information provided in this note is only as accurate as the sources available at the time of update(s)      Prior to Admission medications    Medication Sig Last Dose Taking? Auth Provider   acetaminophen (TYLENOL) 500 MG tablet Take 500-1,000 mg by mouth every 6 hours as needed for mild pain 7/9/2021 at PRN Yes Reported, Patient   dexamethasone (DECADRON) 0.5 MG/5ML elixir Swish 5ml in your mouth for 5 minutes 3 times daily as needed and then spit out. Avoid eating or drinking for 30 minutes after use. For aphthous ulcers. 7/13/2021 at AM Yes Moreno Bruce PA   levothyroxine (SYNTHROID/LEVOTHROID) 175 MCG tablet Take 175 mcg by mouth every morning 7/13/2021 at AM Yes Reported, Patient   vitamin D3 (CHOLECALCIFEROL) 1.25 MG (28238 UT) capsule Take 1 capsule (50,000 Units) by mouth every 7 days 7/12/2021 at AM Yes Shruthi Osorio PA-C   meclizine (ANTIVERT) 25 MG tablet Take 25 mg by mouth 3 times daily as needed for dizziness More than a month  Reported, Patient       Xander Bowen CPhT  Medication Scribe  [facility]

## 2021-07-14 ENCOUNTER — APPOINTMENT (OUTPATIENT)
Dept: GENERAL RADIOLOGY | Facility: CLINIC | Age: 61
End: 2021-07-14
Attending: PODIATRIST
Payer: COMMERCIAL

## 2021-07-14 ENCOUNTER — HOSPITAL ENCOUNTER (OUTPATIENT)
Facility: CLINIC | Age: 61
Discharge: HOME | End: 2021-07-14
Attending: PODIATRIST | Admitting: PODIATRIST
Payer: COMMERCIAL

## 2021-07-14 ENCOUNTER — NURSE TRIAGE (OUTPATIENT)
Dept: NURSING | Facility: CLINIC | Age: 61
End: 2021-07-14

## 2021-07-14 ENCOUNTER — ANESTHESIA (OUTPATIENT)
Dept: SURGERY | Facility: CLINIC | Age: 61
End: 2021-07-14
Payer: COMMERCIAL

## 2021-07-14 VITALS
DIASTOLIC BLOOD PRESSURE: 69 MMHG | HEIGHT: 61 IN | SYSTOLIC BLOOD PRESSURE: 155 MMHG | BODY MASS INDEX: 32.28 KG/M2 | OXYGEN SATURATION: 95 % | RESPIRATION RATE: 16 BRPM | HEART RATE: 54 BPM | TEMPERATURE: 97 F | WEIGHT: 171 LBS

## 2021-07-14 DIAGNOSIS — M21.42 ACQUIRED FLAT FOOT, LEFT: ICD-10-CM

## 2021-07-14 DIAGNOSIS — Z98.890 S/P FOOT SURGERY, LEFT: Primary | ICD-10-CM

## 2021-07-14 PROCEDURE — 999N000063 XR FOOT PORT LEFT 2 VIEWS: Mod: LT

## 2021-07-14 PROCEDURE — 28740 FUSION OF FOOT BONES: CPT | Mod: LT | Performed by: PODIATRIST

## 2021-07-14 PROCEDURE — 250N000011 HC RX IP 250 OP 636: Performed by: PODIATRIST

## 2021-07-14 PROCEDURE — 710N000012 HC RECOVERY PHASE 2, PER MINUTE: Performed by: PODIATRIST

## 2021-07-14 PROCEDURE — 27691 REVISE LOWER LEG TENDON: CPT | Mod: 59 | Performed by: PODIATRIST

## 2021-07-14 PROCEDURE — 360N000076 HC SURGERY LEVEL 3, PER MIN: Performed by: PODIATRIST

## 2021-07-14 PROCEDURE — C1713 ANCHOR/SCREW BN/BN,TIS/BN: HCPCS | Performed by: PODIATRIST

## 2021-07-14 PROCEDURE — 250N000011 HC RX IP 250 OP 636: Performed by: NURSE ANESTHETIST, CERTIFIED REGISTERED

## 2021-07-14 PROCEDURE — 27687 REVISION OF CALF TENDON: CPT | Mod: 59 | Performed by: PODIATRIST

## 2021-07-14 PROCEDURE — 250N000009 HC RX 250: Performed by: ANESTHESIOLOGY

## 2021-07-14 PROCEDURE — 999N000141 HC STATISTIC PRE-PROCEDURE NURSING ASSESSMENT: Performed by: PODIATRIST

## 2021-07-14 PROCEDURE — 271N000001 HC OR GENERAL SUPPLY NON-STERILE: Performed by: PODIATRIST

## 2021-07-14 PROCEDURE — 272N000001 HC OR GENERAL SUPPLY STERILE: Performed by: PODIATRIST

## 2021-07-14 PROCEDURE — 250N000025 HC SEVOFLURANE, PER MIN: Performed by: PODIATRIST

## 2021-07-14 PROCEDURE — C1769 GUIDE WIRE: HCPCS | Performed by: PODIATRIST

## 2021-07-14 PROCEDURE — 258N000003 HC RX IP 258 OP 636: Performed by: NURSE ANESTHETIST, CERTIFIED REGISTERED

## 2021-07-14 PROCEDURE — 370N000017 HC ANESTHESIA TECHNICAL FEE, PER MIN: Performed by: PODIATRIST

## 2021-07-14 PROCEDURE — 250N000009 HC RX 250: Performed by: NURSE ANESTHETIST, CERTIFIED REGISTERED

## 2021-07-14 PROCEDURE — 250N000011 HC RX IP 250 OP 636: Performed by: ANESTHESIOLOGY

## 2021-07-14 PROCEDURE — 710N000009 HC RECOVERY PHASE 1, LEVEL 1, PER MIN: Performed by: PODIATRIST

## 2021-07-14 PROCEDURE — 258N000003 HC RX IP 258 OP 636: Performed by: ANESTHESIOLOGY

## 2021-07-14 PROCEDURE — 999N000179 XR SURGERY CARM FLUORO LESS THAN 5 MIN W STILLS

## 2021-07-14 DEVICE — IMPLANTABLE DEVICE: Type: IMPLANTABLE DEVICE | Site: FOOT | Status: FUNCTIONAL

## 2021-07-14 DEVICE — IMP SCR ARTHREX BLUE LOCKING 3.5X18MM AR-8935L-18: Type: IMPLANTABLE DEVICE | Site: FOOT | Status: FUNCTIONAL

## 2021-07-14 DEVICE — IMP SCR ARTHREX CORTICAL 3.5MMX18MM AR-8935-18: Type: IMPLANTABLE DEVICE | Site: FOOT | Status: FUNCTIONAL

## 2021-07-14 DEVICE — IMP SCR ARTHREX BLUE LOCKING 3.5X20MM AR-8935L-20: Type: IMPLANTABLE DEVICE | Site: FOOT | Status: FUNCTIONAL

## 2021-07-14 DEVICE — IMP SCR ARTHREX BLUE LOCKING 3.5X22MM AR-8935L-22: Type: IMPLANTABLE DEVICE | Site: FOOT | Status: FUNCTIONAL

## 2021-07-14 RX ORDER — IBUPROFEN 600 MG/1
TABLET, FILM COATED ORAL
Qty: 28 TABLET | Refills: 0 | Status: SHIPPED | OUTPATIENT
Start: 2021-07-14 | End: 2021-07-30

## 2021-07-14 RX ORDER — LIDOCAINE HYDROCHLORIDE 20 MG/ML
INJECTION, SOLUTION INFILTRATION; PERINEURAL PRN
Status: DISCONTINUED | OUTPATIENT
Start: 2021-07-14 | End: 2021-07-14

## 2021-07-14 RX ORDER — FENTANYL CITRATE 50 UG/ML
50 INJECTION, SOLUTION INTRAMUSCULAR; INTRAVENOUS
Status: DISCONTINUED | OUTPATIENT
Start: 2021-07-14 | End: 2021-07-14 | Stop reason: HOSPADM

## 2021-07-14 RX ORDER — ONDANSETRON 2 MG/ML
INJECTION INTRAMUSCULAR; INTRAVENOUS PRN
Status: DISCONTINUED | OUTPATIENT
Start: 2021-07-14 | End: 2021-07-14

## 2021-07-14 RX ORDER — GLYCOPYRROLATE 0.2 MG/ML
INJECTION, SOLUTION INTRAMUSCULAR; INTRAVENOUS PRN
Status: DISCONTINUED | OUTPATIENT
Start: 2021-07-14 | End: 2021-07-14

## 2021-07-14 RX ORDER — HYDROCODONE BITARTRATE AND ACETAMINOPHEN 5; 325 MG/1; MG/1
TABLET ORAL
Qty: 20 TABLET | Refills: 0 | Status: SHIPPED | OUTPATIENT
Start: 2021-07-14 | End: 2021-07-30

## 2021-07-14 RX ORDER — PROPOFOL 10 MG/ML
INJECTION, EMULSION INTRAVENOUS PRN
Status: DISCONTINUED | OUTPATIENT
Start: 2021-07-14 | End: 2021-07-14

## 2021-07-14 RX ORDER — SODIUM CHLORIDE, SODIUM LACTATE, POTASSIUM CHLORIDE, CALCIUM CHLORIDE 600; 310; 30; 20 MG/100ML; MG/100ML; MG/100ML; MG/100ML
INJECTION, SOLUTION INTRAVENOUS CONTINUOUS
Status: DISCONTINUED | OUTPATIENT
Start: 2021-07-14 | End: 2021-07-14 | Stop reason: HOSPADM

## 2021-07-14 RX ORDER — MEPERIDINE HYDROCHLORIDE 25 MG/ML
12.5 INJECTION INTRAMUSCULAR; INTRAVENOUS; SUBCUTANEOUS EVERY 5 MIN PRN
Status: DISCONTINUED | OUTPATIENT
Start: 2021-07-14 | End: 2021-07-14 | Stop reason: HOSPADM

## 2021-07-14 RX ORDER — FENTANYL CITRATE 0.05 MG/ML
50 INJECTION, SOLUTION INTRAMUSCULAR; INTRAVENOUS EVERY 5 MIN PRN
Status: DISCONTINUED | OUTPATIENT
Start: 2021-07-14 | End: 2021-07-14 | Stop reason: HOSPADM

## 2021-07-14 RX ORDER — FENTANYL CITRATE 50 UG/ML
INJECTION, SOLUTION INTRAMUSCULAR; INTRAVENOUS PRN
Status: DISCONTINUED | OUTPATIENT
Start: 2021-07-14 | End: 2021-07-14

## 2021-07-14 RX ORDER — CEFAZOLIN SODIUM 2 G/100ML
2 INJECTION, SOLUTION INTRAVENOUS
Status: COMPLETED | OUTPATIENT
Start: 2021-07-14 | End: 2021-07-14

## 2021-07-14 RX ORDER — ONDANSETRON 2 MG/ML
4 INJECTION INTRAMUSCULAR; INTRAVENOUS EVERY 30 MIN PRN
Status: DISCONTINUED | OUTPATIENT
Start: 2021-07-14 | End: 2021-07-14 | Stop reason: HOSPADM

## 2021-07-14 RX ORDER — MORPHINE SULFATE 15 MG/1
15 TABLET, FILM COATED, EXTENDED RELEASE ORAL EVERY 12 HOURS
Qty: 10 TABLET | Refills: 0 | Status: SHIPPED | OUTPATIENT
Start: 2021-07-14 | End: 2021-07-19

## 2021-07-14 RX ORDER — BUPIVACAINE HYDROCHLORIDE 5 MG/ML
INJECTION, SOLUTION EPIDURAL; INTRACAUDAL
Status: DISCONTINUED
Start: 2021-07-14 | End: 2021-07-14 | Stop reason: HOSPADM

## 2021-07-14 RX ORDER — DEXAMETHASONE SODIUM PHOSPHATE 4 MG/ML
INJECTION, SOLUTION INTRA-ARTICULAR; INTRALESIONAL; INTRAMUSCULAR; INTRAVENOUS; SOFT TISSUE
Status: DISCONTINUED
Start: 2021-07-14 | End: 2021-07-14 | Stop reason: WASHOUT

## 2021-07-14 RX ORDER — PROPOFOL 10 MG/ML
INJECTION, EMULSION INTRAVENOUS CONTINUOUS PRN
Status: DISCONTINUED | OUTPATIENT
Start: 2021-07-14 | End: 2021-07-14

## 2021-07-14 RX ORDER — CEFAZOLIN SODIUM 2 G/100ML
2 INJECTION, SOLUTION INTRAVENOUS SEE ADMIN INSTRUCTIONS
Status: DISCONTINUED | OUTPATIENT
Start: 2021-07-14 | End: 2021-07-14 | Stop reason: HOSPADM

## 2021-07-14 RX ORDER — DEXAMETHASONE SODIUM PHOSPHATE 4 MG/ML
INJECTION, SOLUTION INTRA-ARTICULAR; INTRALESIONAL; INTRAMUSCULAR; INTRAVENOUS; SOFT TISSUE PRN
Status: DISCONTINUED | OUTPATIENT
Start: 2021-07-14 | End: 2021-07-14

## 2021-07-14 RX ORDER — HYDROMORPHONE HYDROCHLORIDE 1 MG/ML
0.5 INJECTION, SOLUTION INTRAMUSCULAR; INTRAVENOUS; SUBCUTANEOUS EVERY 5 MIN PRN
Status: DISCONTINUED | OUTPATIENT
Start: 2021-07-14 | End: 2021-07-14 | Stop reason: HOSPADM

## 2021-07-14 RX ORDER — HYDROCODONE BITARTRATE AND ACETAMINOPHEN 5; 325 MG/1; MG/1
1 TABLET ORAL
Status: DISCONTINUED | OUTPATIENT
Start: 2021-07-14 | End: 2021-07-14 | Stop reason: HOSPADM

## 2021-07-14 RX ORDER — ONDANSETRON 4 MG/1
4 TABLET, ORALLY DISINTEGRATING ORAL EVERY 30 MIN PRN
Status: DISCONTINUED | OUTPATIENT
Start: 2021-07-14 | End: 2021-07-14 | Stop reason: HOSPADM

## 2021-07-14 RX ORDER — HYDROXYZINE HYDROCHLORIDE 25 MG/1
TABLET, FILM COATED ORAL
Qty: 20 TABLET | Refills: 0 | Status: SHIPPED | OUTPATIENT
Start: 2021-07-14 | End: 2021-07-30

## 2021-07-14 RX ADMIN — SODIUM CHLORIDE, POTASSIUM CHLORIDE, SODIUM LACTATE AND CALCIUM CHLORIDE: 600; 310; 30; 20 INJECTION, SOLUTION INTRAVENOUS at 07:02

## 2021-07-14 RX ADMIN — LIDOCAINE HYDROCHLORIDE 1 ML: 10 INJECTION, SOLUTION EPIDURAL; INFILTRATION; INTRACAUDAL; PERINEURAL at 07:02

## 2021-07-14 RX ADMIN — LIDOCAINE HYDROCHLORIDE 100 MG: 20 INJECTION, SOLUTION INFILTRATION; PERINEURAL at 07:42

## 2021-07-14 RX ADMIN — PROPOFOL 25 MCG/KG/MIN: 10 INJECTION, EMULSION INTRAVENOUS at 07:47

## 2021-07-14 RX ADMIN — CEFAZOLIN SODIUM 2 G: 2 INJECTION, SOLUTION INTRAVENOUS at 07:37

## 2021-07-14 RX ADMIN — DEXAMETHASONE SODIUM PHOSPHATE 4 MG: 4 INJECTION, SOLUTION INTRA-ARTICULAR; INTRALESIONAL; INTRAMUSCULAR; INTRAVENOUS; SOFT TISSUE at 07:54

## 2021-07-14 RX ADMIN — PHENYLEPHRINE HYDROCHLORIDE 100 MCG: 10 INJECTION INTRAVENOUS at 08:35

## 2021-07-14 RX ADMIN — FENTANYL CITRATE 50 MCG: 50 INJECTION, SOLUTION INTRAMUSCULAR; INTRAVENOUS at 08:05

## 2021-07-14 RX ADMIN — Medication 8 MCG: at 09:21

## 2021-07-14 RX ADMIN — GLYCOPYRROLATE 0.2 MG: 0.2 INJECTION, SOLUTION INTRAMUSCULAR; INTRAVENOUS at 08:37

## 2021-07-14 RX ADMIN — FENTANYL CITRATE 25 MCG: 50 INJECTION, SOLUTION INTRAMUSCULAR; INTRAVENOUS at 09:13

## 2021-07-14 RX ADMIN — FENTANYL CITRATE 25 MCG: 50 INJECTION, SOLUTION INTRAMUSCULAR; INTRAVENOUS at 09:21

## 2021-07-14 RX ADMIN — Medication 12 MCG: at 09:17

## 2021-07-14 RX ADMIN — MIDAZOLAM HYDROCHLORIDE 1 MG: 1 INJECTION, SOLUTION INTRAMUSCULAR; INTRAVENOUS at 07:17

## 2021-07-14 RX ADMIN — Medication 10 ML: at 07:21

## 2021-07-14 RX ADMIN — ONDANSETRON 4 MG: 2 INJECTION INTRAMUSCULAR; INTRAVENOUS at 09:57

## 2021-07-14 RX ADMIN — FENTANYL CITRATE 50 MCG: 50 INJECTION INTRAMUSCULAR; INTRAVENOUS at 07:18

## 2021-07-14 RX ADMIN — PROPOFOL 200 MG: 10 INJECTION, EMULSION INTRAVENOUS at 07:42

## 2021-07-14 ASSESSMENT — ENCOUNTER SYMPTOMS: SEIZURES: 0

## 2021-07-14 ASSESSMENT — LIFESTYLE VARIABLES: TOBACCO_USE: 1

## 2021-07-14 ASSESSMENT — MIFFLIN-ST. JEOR: SCORE: 1273.03

## 2021-07-14 NOTE — BRIEF OP NOTE
[facility]    Brief Operative Note    Pre-operative diagnosis: Acquired flat foot, left [M21.42]  Post-operative diagnosis sp flatfoot reconstruction    Procedure: Procedure(s):  LEFT FLATFOOT RECONSTRUCTION  Surgeon: Surgeon(s) and Role:     * Marko Maya DPM - Primary  Anesthesia: Combined General with Popliteal Block   Estimated blood loss: Less than 10 ml  Drains: None  Specimens: * No specimens in log *  Findings:   good correction of flatfoot with T-N fusion in both sagittal and transverse planes.  Complications: None.  Implants:   Implant Name Type Inv. Item Serial No.  Lot No. LRB No. Used Action   IMP SCR ARTHREX CAN LP 6.7X50MM 28MM THRD TI [id number] - [id number] Metallic Hardware/[place] IMP SCR ARTHREX CAN LP 6.7X50MM 28MM THRD TI [id number] ARTHREX [id number] Left 1 Implanted   IMP PLATE ARTHREX MIDFOOT FLAT H LG RT [id number] - [id number] Metallic Hardware/[place] IMP PLATE ARTHREX MIDFOOT FLAT H LG RT [id number]  ARTHREX 42 07 28 JUN 2021 Left 1 Implanted   IMP SCR ARTHREX BLUE LOCKING 3.5X18MM AR-8935L-18 - [id number] Metallic Hardware/[place] IMP SCR ARTHREX BLUE LOCKING 3.5X18MM [id number]-18  ARTHREX 42 07 28 JUN 2021 Left 1 Implanted   IMP SCR ARTHREX BLUE LOCKING 3.5X20MM AR-8935L-20 - [id number] Metallic Hardware/[place] IMP SCR ARTHREX BLUE LOCKING 3.5X20MM AR-[id number]-20  ARTHREX 42 07 28 JUN 2021 Left 1 Implanted   IMP SCR ARTHREX BLUE LOCKING 3.5X22MM AR-8935L-22 - [id number] Metallic Hardware/[place] IMP SCR ARTHREX BLUE LOCKING 3.5X22MM AR-8935L-22  ARTHREX 42 07 28 JUN 2021 Left 1 Implanted   IMP SCR ARTHREX CORTICAL 3.2PHR31UE AR-8935-18 - [id number] Metallic Hardware/[place] IMP SCR ARTHREX CORTICAL [id number] AR-8935-18  ARTHREX 42 07 28 JUN 2021 Left 1 Implanted

## 2021-07-14 NOTE — ANESTHESIA CARE TRANSFER NOTE
Patient: Muriel Porras    Procedure(s):  LEFT FLATFOOT RECONSTRUCTION    Diagnosis: Acquired flat foot, left [M21.42]  Diagnosis Additional Information: No value filed.    Anesthesia Type:   General     Note:    Oropharynx: oropharynx clear of all foreign objects and spontaneously breathing  Level of Consciousness: drowsy  Oxygen Supplementation: nasal cannula  Level of Supplemental Oxygen (L/min / FiO2): 2  Independent Airway: airway patency satisfactory and stable  Dentition: dentition unchanged  Vital Signs Stable: post-procedure vital signs reviewed and stable  Report to RN Given: handoff report given  Patient transferred to: Phase II    Handoff Report: Identifed the Patient, Identified the Reponsible Provider, Reviewed the pertinent medical history, Discussed the surgical course, Reviewed Intra-OP anesthesia mangement and issues during anesthesia, Set expectations for post-procedure period and Allowed opportunity for questions and acknowledgement of understanding      Vitals: (Last set prior to Anesthesia Care Transfer)  CRNA VITALS  7/14/2021 0941 - 7/14/2021 1016      7/14/2021             Resp Rate (set):  10        Electronically Signed By: HUA Hernandez CRNA  July 14, 2021  10:16 AM

## 2021-07-14 NOTE — ANESTHESIA PROCEDURE NOTES
Airway       Patient location during procedure: OR  Staff -        CRNA: Fariha Washington APRN CRNA       Performed By: CRNA  Consent for Airway        Urgency: elective  Indications and Patient Condition       Indications for airway management: brando-procedural       Induction type:intravenous       Mask difficulty assessment: 0 - not attempted    Final Airway Details       Final airway type: supraglottic airway    Supraglottic Airway Details        Type: LMA       Brand: Ambu AuraGain       LMA size: 4    Post intubation assessment        Placement verified by: capnometry, equal breath sounds and chest rise        Number of attempts at approach: 1       Number of other approaches attempted: 0       Secured with: pink tape       Ease of procedure: easy       Dentition: Intact and Unchanged

## 2021-07-14 NOTE — ANESTHESIA PROCEDURE NOTES
Adductor canal and Femoral Procedure Note  Pre-Procedure   Staff -        Anesthesiologist:  Gus Jones MD       Performed By: anesthesiologist       Location: pre-op       Pre-Anesthestic Checklist: patient identified, IV checked, site marked, risks and benefits discussed, informed consent, monitors and equipment checked, pre-op evaluation, at physician/surgeon's request and post-op pain management  Timeout:       Correct Patient: Yes        Correct Procedure: Yes        Correct Site: Yes        Correct Position: Yes        Correct Laterality: Yes        Site Marked: Yes  Procedure Documentation  Procedure: Adductor canal, Femoral       Laterality: left       Patient Position: supine       Patient Prep/Sterile Barriers: sterile gloves, mask, patient draped       Skin prep: Chloraprep       Local skin infiltrated with 2 mL of 1% lidocaine.        Needle Type: other       Needle Gauge: 21.        Needle Length (millimeters): 90        Ultrasound guided       1. Ultrasound was used to identify targeted nerve, plexus, vascular marker, or fascial plane and place a needle adjacent to it in real-time.       2. Ultrasound was used to visualize the spread of anesthetic in close proximity to the above referenced structure.       3. A permanent image is entered into the patient's record.       4. The visualized anatomic structures appeared normal.       5. There were no apparent abnormal pathologic findings.    Assessment/Narrative         The placement was negative for: blood aspirated, painful injection and site bleeding       Paresthesias: No.     Bolus given via needle..        Secured via.        Insertion/Infusion Method: Single Shot       Complications: none    Medication(s) Administered   Bupivacaine 0.5% w/ 1:400K Epi (Injection), 10 mL  Medication Administration Time: 7/14/2021 7:21 AM    Comments:  Bupivacaine 0.5% with 1:400,000 epi 10ml   Patient sedated but commutative throughout the procedure.    Patient tolerated well.    Ultrasound Interpretation, peripheral nerve block    1.  Under ultrasound guidance, the needle was inserted and placed in close proximity to the nerve.  2. Ultrasound was also used to visualize the spread of the anesthetic in close proximity to the nerve being blocked.  3. The nerve(s) appeared anatomically normal.   4. There were no apparent abnormal pathological findings.  5. A permanent ultrasound image was saved n the patient's record.    The surgeon has given a verbal order transferring this pt to me for a performance of regional analgesia block for post op pain control. It is requested of me because I am trained and qualifed to perform this block and the surgeon is nether trained nor qualified to perform this procedure.

## 2021-07-14 NOTE — ANESTHESIA PROCEDURE NOTES
Sciatic Procedure Note  Pre-Procedure   Staff -        Anesthesiologist:  Gus Jones MD       Performed By: anesthesiologist       Location: pre-op       Pre-Anesthestic Checklist: patient identified, IV checked, site marked, risks and benefits discussed, informed consent, monitors and equipment checked, pre-op evaluation, at physician/surgeon's request and post-op pain management  Timeout:       Correct Patient: Yes        Correct Procedure: Yes        Correct Site: Yes        Correct Position: Yes        Correct Laterality: Yes        Site Marked: Yes  Procedure Documentation  Procedure: Sciatic       Laterality: left       Patient Position: supine       Patient Prep/Sterile Barriers: sterile gloves, mask, patient draped       Skin prep: Chloraprep       Local skin infiltrated with 2 mL of 1% lidocaine.        Needle Type: other       Needle Gauge: 21.        Needle Length (millimeters): 90        Ultrasound guided       1. Ultrasound was used to identify targeted nerve, plexus, vascular marker, or fascial plane and place a needle adjacent to it in real-time.       2. Ultrasound was used to visualize the spread of anesthetic in close proximity to the above referenced structure.       3. A permanent image is entered into the patient's record.       4. The visualized anatomic structures appeared normal.       5. There were no apparent abnormal pathologic findings.    Assessment/Narrative         The placement was negative for: blood aspirated, painful injection and site bleeding       Paresthesias: No.     Bolus given via needle..        Secured via.        Insertion/Infusion Method: Single Shot       Complications: none    Medication(s) Administered   Bupivacaine 0.5% w/ 1:400K Epi (Injection), 20 mL  Medication Administration Time: 7/14/2021 7:20 AM    Comments:  Bupivacaine 0.5% with 1:400,000 epi 20ml   Patient sedated but commutative throughout the procedure.   Patient tolerated  well.    Ultrasound Interpretation, peripheral nerve block    1.  Under ultrasound guidance, the needle was inserted and placed in close proximity to the nerve.  2. Ultrasound was also used to visualize the spread of the anesthetic in close proximity to the nerve being blocked.  3. The nerve(s) appeared anatomically normal.   4. There were no apparent abnormal pathological findings.  5. A permanent ultrasound image was saved n the patient's record.    The surgeon has given a verbal order transferring this pt to me for a performance of regional analgesia block for post op pain control. It is requested of me because I am trained and qualifed to perform this block and the surgeon is nether trained nor qualified to perform this procedure.

## 2021-07-14 NOTE — ANESTHESIA PREPROCEDURE EVALUATION
Anesthesia Pre-Procedure Evaluation    Patient: Muriel Porras   MRN: [id number] : 1960        Preoperative Diagnosis: Acquired flat foot, left [M21.42]   Procedure : Procedure(s):  LEFT FLATFOOT RECONSTRUCTION     Past Medical History:   Diagnosis Date     Hypothyroidism (acquired)       Past Surgical History:   Procedure Laterality Date     COLONOSCOPY WITH CO2 INSUFFLATION N/A 2019    Procedure: COLONOSCOPY, WITH CO2 INSUFFLATION;  Surgeon: Marshall Kaba DO;  Location: [facility] OR      Allergies   Allergen Reactions     Aspirin      Mouth lesions       Social History     Tobacco Use     Smoking status: Former Smoker     Packs/day: 1.00     Types: Cigarettes     Quit date: 2016     Years since quittin.2     Smokeless tobacco: Never Used   Substance Use Topics     Alcohol use: Yes     Alcohol/week: 0.0 standard drinks     Comment: rare      Wt Readings from Last 1 Encounters:   21 77.6 kg (171 lb)        Anesthesia Evaluation   Pt has had prior anesthetic.     No history of anesthetic complications       ROS/MED HX  ENT/Pulmonary:     (+) tobacco use, Past use,  (-) sleep apnea   Neurologic:    (-) no seizures and no CVA   Cardiovascular:    (-) hypertension   METS/Exercise Tolerance:     Hematologic:       Musculoskeletal:       GI/Hepatic:    (-) GERD and liver disease   Renal/Genitourinary:    (-) renal disease   Endo:     (+) thyroid problem, Obesity,  (-) Type II DM   Psychiatric/Substance Use:       Infectious Disease:       Malignancy:       Other:            Physical Exam    Airway        Mallampati: II   TM distance: > 3 FB   Neck ROM: full   Mouth opening: > 3 cm    Respiratory Devices and Support         Dental  no notable dental history         Cardiovascular   cardiovascular exam normal          Pulmonary   pulmonary exam normal                OUTSIDE LABS:  CBC:   Lab Results   Component Value Date    WBC 4.6 2021    WBC 5.1 2020    HGB 13.1 2021    HGB 14.9  09/30/2020    HCT 39.2 07/05/2021    HCT 43.2 09/30/2020     07/05/2021     09/30/2020     BMP:   Lab Results   Component Value Date     09/30/2020     05/18/2016    POTASSIUM 4.1 09/30/2020    POTASSIUM 3.9 05/18/2016    CHLORIDE 106 09/30/2020    CHLORIDE 108 05/18/2016    CO2 24 09/30/2020    CO2 28 05/18/2016    BUN 10 09/30/2020    BUN 17 05/18/2016    CR 0.63 09/30/2020    CR 0.66 05/18/2016     (H) 09/30/2020    GLC 98 06/18/2019     COAGS: No results found for: PTT, INR, FIBR  POC: No results found for: BGM, HCG, HCGS  HEPATIC:   Lab Results   Component Value Date    ALBUMIN 3.9 09/30/2020    PROTTOTAL 7.6 09/30/2020    ALT 27 09/30/2020    AST 21 09/30/2020    ALKPHOS 72 09/30/2020    BILITOTAL 0.5 09/30/2020     OTHER:   Lab Results   Component Value Date    SHANNON 9.0 09/30/2020    TSH 0.15 (L) 07/05/2021    T4 1.30 07/05/2021    CRP 11.9 (H) 05/18/2016    SED 22 05/18/2016       Anesthesia Plan    ASA Status:  2   NPO Status:  NPO Appropriate    Anesthesia Type: General.   Induction: Intravenous.   Maintenance: Balanced.        Consents    Anesthesia Plan(s) and associated risks, benefits, and realistic alternatives discussed. Questions answered and patient/representative(s) expressed understanding.     - Discussed with:  Patient         Postoperative Care    Pain management: Multi-modal analgesia, Peripheral nerve block (Single Shot).   PONV prophylaxis: Ondansetron (or other 5HT-3), Dexamethasone or Solumedrol, Background Propofol Infusion     Comments:                Gus Jones MD

## 2021-07-14 NOTE — TELEPHONE ENCOUNTER
Received voicemail from Sung, [employer]. She wants to confirm that patient had flat foot surgery today so she can ensure that patient's benefits get paid.     Left voicemail informing Sung that patient did have surgery today by Dr. Maya. Asked that she call back for any further questions.     GAETANO Nettles RN

## 2021-07-14 NOTE — OP NOTE
Procedure Date: 07/14/2021    SURGEON:  Marko Maya MD.    ASSISTANT:  Dr. Clemencia Beltran, PGY 2.     PREOPERATIVE DIAGNOSIS:  Symptomatic pes planovalgus with equinus and PT tendon tear, left lower extremity.    POSTOPERATIVE DIAGNOSIS:  Symptomatic pes planovalgus with equinus and tendon tear, left lower extremity.    PROCEDURES:    1.  Left gastrocnemius recession.  2.  Left posterior tibial tendon debridement and repair.  3.  Left talonavicular fusion.    PATHOLOGY:  None.    ANESTHESIA:  General endotracheal with popliteal and saphenous nerve blocks.    HEMOSTASIS:  Well-padded pneumatic thigh cuff.    ESTIMATED BLOOD LOSS:  Less than 10 mL.    MATERIALS:  See nursing note for details.  We utilized an Arthrex 6.7 mm cannulated screw with an Arthrex 4-hole locking plate.    INJECTABLES:  Preoperative popliteal and saphenous nerve blocks by Anesthesia.    COMPLICATIONS:  None apparent.    INDICATIONS FOR PROCEDURE:  The patient is a pleasant 61-year-old female who was referred to me for symptomatic pes planus in the setting of equinus with an underlying PT tendon tear.  She failed nonsurgical management and wished to forego further nonoperative management intervention for surgical management.    DESCRIPTION OF PROCEDURE:  After obtaining written consent, the patient was transferred to the operating room and placed in the supine position on the operating room table.  She was placed under general anesthesia and received preoperative popliteal and saphenous nerve blocks.  The left lower extremity was then prepped and draped in normal aseptic fashion, exsanguinated, and the well-padded pneumatic thigh cuff was inflated.  The patient, procedure, and site were correctly identified by OR staff.    PROCEDURE #1:  Gastrocnemius recession.  A 5 cm incision was carried down to subcutaneous tissue, at the posteromedial left lower extremity.  Bleeding vessels were electrocauterized as necessary.  Blunt dissection was  used to carry the incision down to the deep fascia, which was longitudinally incised.  The aponeurosis and the plantaris tendon were identified and sectioned with Metzenbaum scissors with improvement in ankle dorsiflexion.  The sural nerve was not identified throughout the duration of the procedure.  The wound was flushed with copious amounts of normal saline and closed in layers with 3-0 Vicryl, 4-0 Vicryl and 4-0 nylon.    PROCEDURE #2:  PT tendon debridement and repair:  Attention was directed to the medial ankle where a 12 cm curvilinear incision was carried down along the course of the PT tendon and out past the talonavicular joint.  Bleeding vessels were electrocauterized as necessary.  Blunt dissection was used to carry the incision down to the flexor retinaculum, which was longitudinally incised.  The PT tendon was identified and was noted to have a very large long deep longitudinal tear.  A section of the nonviable tendon was resected.  After reefing the internal margins, the tendon was repaired using an interlocking 2-0 FiberWire stitch.  No other pathology was identified.    PROCEDURE #3:  Talonavicular fusion:  Attention was directed to the talonavicular joint where subperiosteal dissection was performed and the joint was opened with a Hintermann joint distractor.  The joint surfaces were prepared by debriding the cartilage full thickness with a rongeur and a curette.  The subchondral plate was then fenestrated on both sides using a smooth K-wire.  The talonavicular joint was then reduced to correct the underlying deformity in both the transverse and sagittal planes and temporarily fixated with a guide pin for the 6.7 mm cannulated screws.  The joint surfaces were noted to be congruous and in good alignment with intraoperative imaging.  As such, the 6.7 mm cannulated screw was placed using standard AO technique with excellent purchase and compression at the fusion site.  The fusion site was further  stabilized with a 4-hole rhomboid locking plate.  Imaging showed good position of the plate and screws without violation of the naviculocuneiform joints, the subtalar joint and ankle joint.  Loading of the foot showed a vertical bisection of the calcaneus to be parallel with a vertical bisection of the lower leg and as such, a [place] calcaneal osteotomy was not necessary    The wound was flushed with copious amounts of normal saline and layered closure was performed with 3-0 Vicryl, 4-0 Vicryl and skin staples.    A dry sterile dressing was applied to the patient's left lower extremity.  .  The patient was transferred to the PACU with vital signs stable and vascular status intact.  The patient will discharge to home and follow up with me in approximately 1 week or sooner with any acute issues.    Marko Maya DPM        D: 2021   T: 2021   MT: kristy    Name:     SAMY MADDOX  MRN:      [id number]        Account:        [id number]   :      1960           Procedure Date: 2021     Document: [id number]

## 2021-07-14 NOTE — DISCHARGE INSTRUCTIONS
Reasons to contact your surgeon:    1. Signs of possible infection: Check your incision daily for redness, swelling, warmth, red streaks or foul drainage.   2. Elevated temperature.  3. Pain not controlled with pain medication and/or rest.   4. Uncontrolled nausea or vomiting.  5. Any questions or concerns.      Watch you tube video at home on how to administer lovenox blood thinner injections- You should start taking the asprin the day after your last lovenox injection but if you start experiencing any adverse reactions such as open sores in your mouth you should stop taking it- if you start experiencing any numbness, tingles to the mouth or swelling of the lips or shortness of breath you should call 9-11       You may call Dr Maya's office tomorrow if you want a knee scooter ordered      Same Day Surgery Discharge Instructions for  Sedation and General Anesthesia       It's not unusual to feel dizzy, light-headed or faint for up to 24 hours after surgery or while taking pain medication.  If you have these symptoms: sit for a few minutes before standing and have someone assist you when you get up to walk or use the bathroom.      You should rest and relax for the next 24 hours. We recommend you make arrangements to have an adult stay with you for at least 24 hours after your discharge.  Avoid hazardous and strenuous activity.      DO NOT DRIVE any vehicle or operate mechanical equipment for 24 hours following the end of your surgery.  Even though you may feel normal, your reactions may be affected by the medication you have received.      Do not drink alcoholic beverages for 24 hours following surgery.       Slowly progress to your regular diet as you feel able. It's not unusual to feel nauseated and/or vomit after receiving anesthesia.  If you develop these symptoms, drink clear liquids (apple juice, ginger ale, broth, 7-up, etc. ) until you feel better.  If your nausea and vomiting persists for 24 hours,  please notify your surgeon.        All narcotic pain medications, along with inactivity and anesthesia, can cause constipation. Drinking plenty of liquids and increasing fiber intake will help.      For any questions of a medical nature, call your surgeon.      Do not make important decisions for 24 hours.      If you had general anesthesia, you may have a sore throat for a couple of days related to the breathing tube used during surgery.  You may use Cepacol lozenges to help with this discomfort.  If it worsens or if you develop a fever, contact your surgeon.       If you feel your pain is not well managed with the pain medications prescribed by your surgeon, please contact your surgeon's office to let them know so they can address your concerns.       CoVid 19 Information    We want to give you information regarding Covid. Please consult your primary care provider with any questions you might have.     Patient who have symptoms (cough, fever, or shortness of breath), need to isolate for 7 days from when symptoms started OR 72 hours after fever resolves (without fever reducing medications) AND improvement of respiratory symptoms (whichever is longer).      Isolate yourself at home (in own room/own bathroom if possible)    Do Not allow any visitors    Do Not go to work or school    Do Not go to [employer],  centers, shopping, or other public places.    Do Not shake hands.    Avoid close and intimate contact with others (hugging, kissing).    Follow CDC recommendations for household cleaning of frequently touched services.     After the initial 7 days, continue to isolate yourself from household members as much as possible. To continue decrease the risk of community spread and exposure, you and any members of your household should limit activities in public for 14 days after starting home isolation.     You can reference the following CDC link for helpful home isolation/care  "tips:  https://www.cdc.gov/coronavirus/2019-ncov/downloads/10Things.pdf    Protect Others:[facility]      DISCHARGE INSTRUCTIONS FOLLOWING   REGIONAL BLOCK ANESTHESIA      Numbness or lack of feeling in the arm/leg that was operated may last up to 24 hours.  The average time is usually 10-15 hours.  You may not be able to lift or move the arm or leg where the operation was by itself during that time.  Long-acting local anesthetic medicines were used to give you long-lasting pain relief.    Wear a sling until your arm is completely \"awake\"    Avoid bumping your arm, leg or foot while it is numb    Avoid extremes of hot or cold while it is numb    Remain quiet and restful the day of surgery.  Resume normal activities gradually over the next day or so as advise by your surgeon.    Do not drive or operate  Any machinery until your extremity is full  \"awake\"        You will have a tingling and prickly sensation in your arm/leg as the feeling begins to return; you can also expect some discomfort. The amount of discomfort is unpredictable, but if you have more pain that can be controlled with the pain medication you received, you should contact your surgeon.  Start to take your pain pills as soon as you start to feel any discomfort or pain.                    Crutch Instructions      Because of your surgery you will need crutches.  Make sure you tell your healthcare provider if crutches do not seem to work for you.  Using Crutches   Crutches are the most commonly used device for injuries to the lower part of the body because they allow the most mobility. All walking assistance devices require strength in your upper body but crutches require more coordination. If you are unable to use crutches then a cane or walker may be better.   Remember these rules when using crutches:   Always look straight ahead when you walk. Do not look down.   Hold the top padded part of the crutches into your chest near " "your armpits. Grasp the padded hand  and always support your weight with your arms and hands.   Do not lean on the crutches with your armpit as this could cause nerve damage.  Standing Up  Make sure you are in a stable chair. Move forward to the edge of the chair so that your  good  foot is flat on the floor. Put both crutches together and hold the handgrips in one hand. Stretch the injured leg out straight and then use the crutches with one hand and the chair armrest with the other hand to push yourself into a standing position onto your  good  leg. Once you are standing, wait until you are steady before placing a crutch in each hand. Until you become good at standing, have someone assist you in case you lose your balance. When standing still, lean slightly forward with the crutches ahead of you and about 3 feet apart. Unless you are told otherwise, you should keep weight off your injured leg.  Walking  To begin crutch walking, balance yourself on your  good  leg. Move both crutches forward about the length of one step and place them firmly on the floor in front of you about 3 feet apart. Support your weight on the padded hand rests while leaning forward. Press the top of the crutches against your chest wall and not into your armpits. Be sure to hold the injured leg up off of the floor. When ready, swing the \"good\" leg forward about one step length past the crutches while supporting your weight on the padded hand . Be careful not to go too far. Transfer your weight onto the \"good\" leg then bring both crutches forward about the length of one step. Repeating this motion will allow you to move fairly quickly without the use of your injured leg. Keep practicing until you become good at crutch walking.  Sitting Down  Make sure the chair you are about to sit on is stable. Walk in front of the chair such that you are facing away from it. Move back a little at a time until the back of your  good  leg is nearly " "touching the seat. Keeping your weight on the  good  leg, move both crutches to one hand holding onto the handgrips. Lean forward, bend your  good  knee, and move your injured leg out forward. Sit down slowly while using your free hand to reach for the chair s armrest for support. Place the crutches where you can easily get them. Never pivot, even on your  good  leg. This could cause you to fall or injure your  good  leg.  Navigating Stairs, Curbs & Door Steps  Only attempt this once you are very comfortable with regular crutch walking and only when someone is there to steady you should you begin to lose your balance. Hold on to a  railing with one hand and both crutches in the other hand or have someone carry the loose crutch for you. It does not matter which side the handrail is on. If there is no handrail, you can use both crutches. Using only crutches is the same as the railing method but maintaining your balance can be difficult. The crutch-only method is not recommended until you have become very good at crutch walking. Be sure to only take one step at a time. A simple rule to remember for crutches when navigating stairs or curbs: up with the  good  leg and down with the  bad .  Going Up Stairs or Curbs  Walk close to the first step with the crutches slightly behind you. Push down on the handrail and the crutch and step up with the \"good\" leg. You may have to make a short hop to do this if you are not allowed to place any weight on the injured leg. Lean forward and bring the injured leg and the crutch up beside the \"good\" leg. Repeat this until you have climbed up all of the steps. Remember, the \"good\" leg goes up first and the crutches move with the injured leg. The person helping you should stand behind and to your side that is away from the railing.  Going Down Stairs or Curbs  Walk to the edge of the first step. While standing and balancing on the  good  leg, place both crutches in one hand and " "then down on the step below. Be sure to support your weight by leaning on the crutches and handrail. Bring the injured leg forward, then the \"good\" leg down to the same step as the crutches. Remember crutches go down first with the injured leg. The person helping you should  front and to your side that is away from the railing.  Sitting Method for Navigating Stairs  A safer way to get up and down stairs is to sit down. To go up steps, sit down on the second or third step. Push with your  good  leg below while pushing up with both arms on the step above to move your bottom to the next step. When you get to the top of the stairs you may need to use the  scooting  method described later to get back up. To go down steps you first need to lower yourself to the floor near the top of the steps. Once seated, support yourself with your  good  leg on the second to next step below, and push with both arms on the step where you are sitting to move your bottom down to the next step. When you reach the bottom, pull yourself up to standing position using your crutches. It is helpful if someone can move your crutches and assist you in getting up and down. With practice it may be possible to manage on your own.  If You Start To Fall  If you start to fall and cannot get your balance, throw the crutches away from you. Try to fall onto your  good  side away from your injury and then break your fall with your arms. If uninjured, you can usually get back up by moving into a sitting position and scooting to a chair. Push with your arms and hands on the chair seat while pushing with the \"good\" leg to get up into the chair. You should not attempt to get back up if you are having significant pain. Call for assistance or dial 911 for an ambulance if necessary.  Safety Tips for Crutch Walking   At first you may want to wear a training belt (or a strong regular belt) so others can assist you.   Do not use crutches if you feel dizzy or " drowsy.   Be careful on slick or wet surfaces like a kitchen or bathroom floor, snow, ice, or rainy conditions.   Temporarily remove pets, throw rugs or other items from your home that might trip you.   Do not hop around while holding onto furniture.   Wear sneakers or rubber soled shoes that will not easily slip or come off.   Be careful on ramps or slopes as they can be harder to walk up or down   Do not remove any parts from your crutches especially the padding or rubber traction footings. Replace padding or footings that become damaged immediately.   It is important to use your crutches correctly. If you feel any numbness or tingling in your arms, you are probably using the crutches incorrectly.  Helpful Hints   A bedside toilet or toilet riser may be helpful.   Always allow for extra time to get around. Children in school should be given permission to leave class early to avoid crowds when changing classes.   Elevate the injured leg when sitting.   Use a backpack to carry books or waist pouch to carry other items so that both hands are free.   Call your healthcare provider if you have any questions or concerns.      **If you have questions or concerns about your procedure,   call Dr. Maya at [phone]**

## 2021-07-14 NOTE — ANESTHESIA POSTPROCEDURE EVALUATION
Patient: Muriel Porras    Procedure(s):  LEFT FLATFOOT RECONSTRUCTION    Diagnosis:Acquired flat foot, left [M21.42]  Diagnosis Additional Information: No value filed.    Anesthesia Type:  General    Note:  Disposition: Outpatient   Postop Pain Control: Uneventful            Sign Out: Well controlled pain   PONV: No   Neuro/Psych: Uneventful            Sign Out: Acceptable/Baseline neuro status   Airway/Respiratory: Uneventful            Sign Out: Acceptable/Baseline resp. status   CV/Hemodynamics: Uneventful            Sign Out: Acceptable CV status; No obvious hypovolemia; No obvious fluid overload   Other NRE: NONE   DID A NON-ROUTINE EVENT OCCUR? No           Last vitals:  Vitals:    07/14/21 1230 07/14/21 1300 07/14/21 1330   BP: (!) 142/78 (!) 142/72 (!) 155/69   Pulse:      Resp: 14 14 16   Temp:      SpO2: 95% 95% 95%       Last vitals prior to Anesthesia Care Transfer:  CRNA VITALS  7/14/2021 0941 - 7/14/2021 1041      7/14/2021             Resp Rate (set):  10          Electronically Signed By: Gus Jones MD  July 14, 2021  1:51 PM

## 2021-07-14 NOTE — OR NURSING
Dr Dejesus called to clarify aspirin order- lovenox to be sent to pts own pharmacy tomorrow - Up to BR voids light pale X1- [id number] sats >92% RA- Good PO intake, Denies c/o- Discharge instructions by phone per Hospital COVID Protocol w  Hussein-  Pt and responsible adult verbalize understanding of discharge instructions, denies questions. Up in W/C - transported to door for discharge to home by staff- Nurse Aidflorida Villalpando per Hospital Peripheral Nerve Block Policy.

## 2021-07-15 ENCOUNTER — TELEPHONE (OUTPATIENT)
Dept: FAMILY MEDICINE | Facility: CLINIC | Age: 61
End: 2021-07-15

## 2021-07-15 DIAGNOSIS — M21.41 FLAT FEET: Primary | ICD-10-CM

## 2021-07-15 DIAGNOSIS — M21.42 FLAT FEET: Primary | ICD-10-CM

## 2021-07-15 DIAGNOSIS — Z98.890 S/P FOOT SURGERY: ICD-10-CM

## 2021-07-15 NOTE — TELEPHONE ENCOUNTER
Follow up today if necessary, she's currently scheduled.  Order for [employer] signed, can  today in [place].    Marko Maya DPM [facility] FACFA  Podiatric Foot & Ankle Surgeon  [facility]  [phone]

## 2021-07-15 NOTE — TELEPHONE ENCOUNTER
Francisco, patient  calling, no consent to communicate. Patient got on phone to give permission to speak with .   Patient had surgery early this morning on her foot for flat feet and her achilles tendon. Patient has been feeling okay. The bandages around her foot and ankle are really thick and the back towards the heel there is blood coming through. The dull looking stain is 3 inches across but the bright red area is like a sliver a quarter of an inch wide and an inch long. That area is not wet or saturated.   Two incisions one on the inside of the ankle and the other on the achilles area up the back from the heel. Leg is still numb from nerve blocks.  Denies fever, pain.   Protocol recommends follow up with surgeon office in the morning. Reviewed AVS for aftercare instructions and encouraged  to start icing, ROM on BLE, and to won the dressing for the drainage and note the time.    verbalized understanding and will call back with worsening symptoms or further questions.  Gladys Lovell RN   07/14/21 8:53 PM  [facility] Nurse Advisor  Reason for Disposition    [1] Clear or blood-tinged fluid draining from wound AND [2] no fever    Additional Information    Negative: [1] Major abdominal surgical incision AND [2] wound gaping open AND [3] visible internal organs    Negative: Sounds like a life-threatening emergency to the triager    Negative: [1] Bleeding from incision AND [2] won't stop after 10 minutes of direct pressure    Negative: [1] Widespread rash AND [2] bright red, sunburn-like    Negative: Severe pain in the incision    Negative: [1] Incision gaping open AND [2] < 48 hours since wound re-opened    Negative: [1] Incision gaping open AND [2] length of opening > 2 inches (5 cm)    Negative: Patient sounds very sick or weak to the triager    Negative: Sounds like a serious complication to the triager    Negative: Fever > 100.4 F (38.0 C)    Negative: [1] Incision looks infected  (spreading redness, pain) AND [2] fever > 99.5 F (37.5 C)    Negative: [1] Incision looks infected (spreading redness, pain) AND [2] large red area (> 2 in. or 5 cm)    Negative: [1] Incision looks infected (spreading redness, pain) AND [2] face wound    Negative: [1] Red streak runs from the incision AND [2] longer than 1 inch (2.5 cm)    Negative: [1] Pus or bad-smelling fluid draining from incision AND [2] no fever    Negative: [1] Post-op pain AND [2] not controlled with pain medications    Negative: Dressing soaked with blood or body fluid (e.g., drainage)    Negative: [1] Raised bruise and [2] size > 2 inches (5 cm) and expanding    Negative: [1] Caller has URGENT question AND [2] triager unable to answer question    Negative: [1] Small red area or streak AND [2] no fever    Negative: [1] INCREASING pain in incision AND [2] > 2 days (48 hours) since surgery    Protocols used: POST-OP INCISION SYMPTOMS AND [id number]       No

## 2021-07-15 NOTE — TELEPHONE ENCOUNTER
"Dr. Maya/Triage-Please review.  Please call patient's , Francisco ([phone]), to advise on whether reinforcement of surgical dressing, or evaluation recommended.  Please route encounter to appropriate care team pool.  Call received by [facility].    Call received from Francisco, patient's .    No consent to communicate on file.    Patient got on phone and gave writer verbal permission to speak with Francisco.    Per Francisco:  1. Dressing on patient's foot last night showed shadowing of blood  2. Spoke with a triage nurse and milan a line around shadowing with magic marker-marked with time of day  3. This AM, one spot of stain is larger than ouline, but not a lot    A. Bottom edge of heel goes over line about 1/4\" more   4. Does patient need to be evaluated prior to 7/23/21 follow up visit?    Informed Francisco:  1. A high priority message will be sent to Dr. Maya and his care team for follow up  2. Based on this morning's description of shadowing, it appears bleeding is slowed down  3. Place towel underneath heel and have patient elevate surgical foot as much as possible  4. Continue to follow discharge instructions    Thank you!  .OUMOU HollisN, RN  [facility]            "

## 2021-07-15 NOTE — TELEPHONE ENCOUNTER
"Received message from Dr. Maya     \"Good morning     Have her continue to monitor for the rest of the day. Please schedule her for 3:00 this afternoon(we're already booked but we'll work her in.  Tell her to arrive at 2:45) but she can cancel if this doesn't progress throughout the day.     Thanks     Marko\"    Called and spoke with  Francisco.  He states the area of blood on the dressing has not changed since he called this am, he is wondering if he should be cincerned    Informed Francisco that Dr. Maay said he could see Muriel this afternoon. I asked Francisco to keep an eye on the area of dried blood and the [place] he milan around the area and if there is no changes by ~1pm, he may call and cancel 3pm appointment in [place].    Patient verbalized agreement.  He also asked about rolling knee scooter Rx. Looking to pick one up today at [facility]    Order pending, please sign    Aleshia Matos ATC      "

## 2021-07-17 ENCOUNTER — TELEPHONE (OUTPATIENT)
Dept: NURSING | Facility: CLINIC | Age: 61
End: 2021-07-17

## 2021-07-17 ENCOUNTER — NURSE TRIAGE (OUTPATIENT)
Dept: NURSING | Facility: CLINIC | Age: 61
End: 2021-07-17

## 2021-07-17 NOTE — TELEPHONE ENCOUNTER
Patient calling with questions regarding her after visit summary from her surgery. Patient wondering if she was to be taking Lovenox as it was not sent to her pharmacy. Advised this was sent to the [facility] pharmacy but could be transferred to her local [facility]. Patient to contact [facility] to initiate pharmacy to pharmacy transfer.     Kathleen Dawkins RN 07/17/21 9:43 AM   [facility] Triage Nurse Advisor

## 2021-07-17 NOTE — TELEPHONE ENCOUNTER
Caller is    States they have obtained the Lovenox she should have gotten upon discharge 3 days ago. Was not instructed to go pharmacy to get it and other meds were handed to her at discharge.    States the surgeon  gave verbal instructions about it  But  when they were not handed it they  forgot about it until they saw instructions for aspirin use after finishing Lovenox   Spoke to  FNA earlier who assisted in getting med but they do not see it referenced in AVS instructions   Reviewed AVS instruction  and  states he did not notice the large bold print instructions that addresses use of Lovenox.  Understands now and will begin using it daily.     Vero Quiles RN  FNA       Reason for Disposition    Caller has medication question, adult has minor symptoms, caller declines triage, AND triager answers question    Protocols used: MEDICATION QUESTION CALL-A-

## 2021-07-23 ENCOUNTER — OFFICE VISIT (OUTPATIENT)
Dept: PODIATRY | Facility: CLINIC | Age: 61
End: 2021-07-23
Payer: COMMERCIAL

## 2021-07-23 VITALS — BODY MASS INDEX: 32.31 KG/M2 | WEIGHT: 171 LBS

## 2021-07-23 DIAGNOSIS — M21.42 ACQUIRED FLAT FOOT, LEFT: Primary | ICD-10-CM

## 2021-07-23 DIAGNOSIS — Z98.890 S/P FOOT SURGERY, LEFT: ICD-10-CM

## 2021-07-23 PROCEDURE — 99024 POSTOP FOLLOW-UP VISIT: CPT | Performed by: PODIATRIST

## 2021-07-23 NOTE — PROGRESS NOTES
Foot & Ankle Surgery  July 23, 2021    S:  Patient in today sp flatfoot reconstruction with gastroc recession, PT tendon debridement and repair and talonavicular fusion on 7/14/2021.  Pain levels improving.  She suspects she may be developing a UTI.  She is also been sleepy and has had limited appetite    Wt 77.6 kg (171 lb)   LMP 09/01/2014   BMI 32.31 kg/m        ROS - positive for CC.  Patient denies current nausea, vomiting, chills, fevers, belly pain, calf pain, chest pain or SOB.  Complete remainder of ROS is otherwise neg.    PE -moderate dried blood on the bandage.  Staples and sutures are intact.  There is moderate bruising and swelling but no signs of infection..  Skin shows no trophic, color or temperature changes otherwise.  No calf redness, swelling or pain noted otherwise.    Imaging - IMPRESSION: Talonavicular arthrodesis. Medial skin staples are in  place.    A/P - 61 year old yo patient approx 9 days sp above procedure  -Dressing change done in clinic today  -We reviewed x-rays and surgical findings/procedures.  -We discussed that feeling sleepy is not significantly atypical in taking pain medication.  Advise she transition off of the Norco to Tylenol as tolerated  -I advise she call her PCP to discuss possible treatment for the suspected UTI, as this potentially could be leading to her symptoms.  -No indication for oral antibiotics secondary to surgical site infection  -Continue all postoperative instructions without change; reviewed  -Follow-up next week with Dr. Hammonds for suture and staple removal.  If removed: Continue nonweightbearing, DVT exercises, compression with Tensogrip sleeve; ice elevate rest as needed based on swelling and pain, okay to increase activity level to tolerance, okay to wash foot starting the next day  -Follow-up with me 6 weeks postop for x-rays    Follow up  -1 week or sooner with acute issues    Plan for [id number] -once healed sufficiently to resume job  jaysonies      Marko Maya, GAL FACFAS FACFAOM  Podiatric Foot & Ankle Surgeon  [facility]  [phone]    Disclaimer: This note consists of symbols derived from keyboarding, dictation and/or voice recognition software. As a result, there may be errors in the script that have gone undetected. Please consider this when interpreting information found in this chart.

## 2021-07-28 ENCOUNTER — TELEPHONE (OUTPATIENT)
Dept: PODIATRY | Facility: CLINIC | Age: 61
End: 2021-07-28

## 2021-07-28 NOTE — TELEPHONE ENCOUNTER
Patient calls stating she was not able to view her AVS in [employer] from her 7/23/21 appointment with Dr. Maya .   She needs information for her employer of how much time off work she will need.   Patient informed that Dr. Maya has not had a chance to complete his note for that date yet.     Inquired if she needed a letter. She will discuss with employer and call us back to let us know if a letter is needed and what it needs to include.   Triage number provided.     GAETANO Nettles RN

## 2021-07-29 ENCOUNTER — TELEPHONE (OUTPATIENT)
Dept: NURSING | Facility: CLINIC | Age: 61
End: 2021-07-29

## 2021-07-29 ENCOUNTER — TELEPHONE (OUTPATIENT)
Dept: PODIATRY | Facility: CLINIC | Age: 61
End: 2021-07-29

## 2021-07-29 NOTE — TELEPHONE ENCOUNTER
Paloma calling from orthopaedics office for Dr. Maya.   Patient had foot surgery and is now running a fever of 101.  was calling with patient and call dropped before transfer.   Call placed to  of patient. No answer. Left voicemail requesting call back to nurse line.   Gladys Lovell RN   07/29/21 5:00 PM  [facility] Nurse Advisor

## 2021-07-29 NOTE — TELEPHONE ENCOUNTER
[facility] Call Center    Phone Message    May a detailed message be left on voicemail: yes     Reason for Call: Symptoms or Concerns     If patient has red-flag symptoms, warm transfer to triage line    Current symptom or concern: high temp and shooting pain earlier today    Symptoms have been present for:  1 day(s)    Has patient previously been seen for this? No    By : N/A    Date: N/A    Are there any new or worsening symptoms? No    Patient had surgery on 7/14/21.  The shooting pain started on same foot for surgery and went up into torso.  Patient immediately started to feel worse.    Please call spouseFrancisoc @ [phone].    Action Taken: Message routed to:  Other: [facility]    Travel Screening: Not Applicable

## 2021-07-29 NOTE — TELEPHONE ENCOUNTER
said wife had surgery 2 weeks ago and is experiencing fever, pain, nausea. Called triage and they asked him to leave a voicemail message here.    Tel: [phone]

## 2021-07-30 ENCOUNTER — TELEPHONE (OUTPATIENT)
Dept: PODIATRY | Facility: CLINIC | Age: 61
End: 2021-07-30

## 2021-07-30 ENCOUNTER — OFFICE VISIT (OUTPATIENT)
Dept: PODIATRY | Facility: CLINIC | Age: 61
End: 2021-07-30
Payer: COMMERCIAL

## 2021-07-30 DIAGNOSIS — Z09 SURGERY FOLLOW-UP EXAMINATION: ICD-10-CM

## 2021-07-30 DIAGNOSIS — G89.18 POST-OPERATIVE PAIN: Primary | ICD-10-CM

## 2021-07-30 PROCEDURE — 99024 POSTOP FOLLOW-UP VISIT: CPT | Performed by: PODIATRIST

## 2021-07-30 RX ORDER — OXYCODONE HYDROCHLORIDE 5 MG/1
5 TABLET ORAL EVERY 6 HOURS PRN
Qty: 12 TABLET | Refills: 0 | Status: SHIPPED | OUTPATIENT
Start: 2021-07-30 | End: 2021-08-02

## 2021-07-30 NOTE — TELEPHONE ENCOUNTER
Spoke with pt. Informed her due to her recent fever within 24 hours we will be following the covid rooming protocol. She needs to call the  ([phone] opt [phone]) when she arrives to be escorted to the exam room directly. She is coming in with her daughter.    No further questions.

## 2021-07-30 NOTE — TELEPHONE ENCOUNTER
"I called and spoke with  Francisco.  Muriel was up multiple times overnight, throwing up.  Had a fever of 100-100.8.  Some diarrhea as well.  Pain in ankle \"short-lived thing\", \"about an hour long\".  Pain first, then constitutional symptoms noted.  Last threw up about 30-60 minutes ago.  States \"she feels drained\" but temp down to 99.3.  \"toes are nice and warm\", hasn't undone dressing otherwise.  No pain in foot currently.  Symptoms may have coincided with transitioning to tylenol.  Stopped taking tylenol last night, transitioned back to Atarax medication.   currently at work, Muriel is at home.  She has an appointment with Dr Hammonds at 11:00.      They're also still both concerned about having a UTI.  If patient is having lower abdominal pain and issues with frequency/urgency, certainly could be a UTI and possibly responsible for symptoms.  Advised a call to PCP to discuss.    Marko Maya DPM FACFAS FACFAOM  Podiatric Foot & Ankle Surgeon  [facility]  [phone]      "

## 2021-07-30 NOTE — TELEPHONE ENCOUNTER
Note finished. If patient needs further information she can call the clinic.    Closing encounter    Kerri Haney ATC

## 2021-07-30 NOTE — PROGRESS NOTES
S: Muriel Porras  presents  2 weeks (7/14/21)  post op.      POSTOPERATIVE DIAGNOSIS:  Symptomatic pes planovalgus with equinus and tendon tear, left lower extremity.     PROCEDURES:    1.  Left gastrocnemius recession.  2.  Left posterior tibial tendon debridement and repair.  3.  Left talonavicular fusion.    Her care plan is outlined in detail and the Dr. Jacobo's last note dated 7/23/2021.  She has experienced some nausea, vomiting, reported low-grade fever.  She thinks this might be related to the Tylenol she is taking.  Nonetheless, Covid protocol was followed today.  She said she was instructed to take oxycodone prior to today's visit and forgot.    Her pain is controlled.  She has almost completed the Lovenox.  She is wiggling her toes.  Nonweightbearing with a wheeled knee walker.      O:   Vascular:  Pedal pulses are palpable.  Mild left foot edema.    Neuro: Light touch sensation is intact distal to the incisions.    Derm:  the incision is well coapted.  Sutures are intact.  No significant brando-incisional erythema.     ASSESSMENT:  Encounter Diagnoses   Name Primary?     Post-operative pain Yes     Surgery follow-up examination      I am uncertain what is causing her nausea vomiting and low-grade fever.  PLAN:  If she continues to not feel well, she is to follow-up with her primary care physician.  I do not think her symptoms are related to her surgical sites.  There are no clinical signs of infection.  If she thinks her symptoms are related to Tylenol, we will discontinue this.  I recommend that she take 600 mg of ibuprofen every 4 hours.  She can discontinue the Lovenox at this time.  Oxycodone was refilled and is to be used sparingly.  She can take 1 or 2 prior to her next visit for staple removal.  The sutures on the posterior medial left leg were removed without difficulty.  Some of the staples were removed, yet there was some gapping of bleeding at the foot level.  The remainder of the staples  were left in and some Steri-Strips applied.   She tolerated the removal well.  Removal was only discontinued due to incomplete healing at the foot level.  I reviewed the protocol of 6 to 8 weeks nonweightbearing.  I encouraged gentle ankle range of motion exercises.  These can be done with the cam walker off.  I explained that the cam walker is her cast, for protection.  I also explained that helps keep her ankle at 90 degrees to minimize development of equinus.  Sterile redress of the right lower extremity    Follow-up with Dr. Maya next week      Melecio Hammonds DPM;

## 2021-07-30 NOTE — PATIENT INSTRUCTIONS
Thank you for choosing [facility] Podiatry / Foot & Ankle Surgery!    DR. ORLANDO'S CLINIC LOCATIONS     [facility] SURGERY: [phone]   [address] [place] [address] APPOINTMENTS: [phone]   [place] [address] BILLING QUESTIONS: [phone]  [phone] RADIOLOGY: [phone]       [place] [id number]    [place] [address]    [phone]  [phone]      Follow up: 8/5/21 with Dr. Maya

## 2021-07-30 NOTE — LETTER
7/30/2021         RE: Muriel Porras  [address]  [place] [address]        Dear Colleague,    Thank you for referring your patient, Muriel Porras, to the [facility]. Please see a copy of my visit note below.    S: Muriel Porras  presents  2 weeks (7/14/21)  post op.      POSTOPERATIVE DIAGNOSIS:  Symptomatic pes planovalgus with equinus and tendon tear, left lower extremity.     PROCEDURES:    1.  Left gastrocnemius recession.  2.  Left posterior tibial tendon debridement and repair.  3.  Left talonavicular fusion.    Her care plan is outlined in detail and the Dr. Jacobo's last note dated 7/23/2021.  She has experienced some nausea, vomiting, reported low-grade fever.  She thinks this might be related to the Tylenol she is taking.  Nonetheless, Covid protocol was followed today.  She said she was instructed to take oxycodone prior to today's visit and forgot.    Her pain is controlled.  She has almost completed the Lovenox.  She is wiggling her toes.  Nonweightbearing with a wheeled knee walker.      O:   Vascular:  Pedal pulses are palpable.  Mild left foot edema.    Neuro: Light touch sensation is intact distal to the incisions.    Derm:  the incision is well coapted.  Sutures are intact.  No significant brando-incisional erythema.     ASSESSMENT:  Encounter Diagnoses   Name Primary?     Post-operative pain Yes     Surgery follow-up examination      I am uncertain what is causing her nausea vomiting and low-grade fever.  PLAN:  If she continues to not feel well, she is to follow-up with her primary care physician.  I do not think her symptoms are related to her surgical sites.  There are no clinical signs of infection.  If she thinks her symptoms are related to Tylenol, we will discontinue this.  I recommend that she take 600 mg of ibuprofen every 4 hours.  She can discontinue the Lovenox at this time.  Oxycodone was refilled and is to be used sparingly.  She  can take 1 or 2 prior to her next visit for staple removal.  The sutures on the posterior medial left leg were removed without difficulty.  Some of the staples were removed, yet there was some gapping of bleeding at the foot level.  The remainder of the staples were left in and some Steri-Strips applied.   She tolerated the removal well.  Removal was only discontinued due to incomplete healing at the foot level.  I reviewed the protocol of 6 to 8 weeks nonweightbearing.  I encouraged gentle ankle range of motion exercises.  These can be done with the cam walker off.  I explained that the cam walker is her cast, for protection.  I also explained that helps keep her ankle at 90 degrees to minimize development of equinus.  Sterile redress of the right lower extremity    Follow-up with Dr. Maya next week      Melecio Hammonds DPM;        Again, thank you for allowing me to participate in the care of your patient.        Sincerely,        Melecio Hammonds DPM

## 2021-07-31 ENCOUNTER — NURSE TRIAGE (OUTPATIENT)
Dept: NURSING | Facility: CLINIC | Age: 61
End: 2021-07-31

## 2021-07-31 ENCOUNTER — HOSPITAL ENCOUNTER (EMERGENCY)
Facility: CLINIC | Age: 61
Discharge: HOME | End: 2021-07-31
Attending: EMERGENCY MEDICINE | Admitting: EMERGENCY MEDICINE
Payer: COMMERCIAL

## 2021-07-31 VITALS
TEMPERATURE: 100.1 F | SYSTOLIC BLOOD PRESSURE: 144 MMHG | HEART RATE: 77 BPM | RESPIRATION RATE: 17 BRPM | DIASTOLIC BLOOD PRESSURE: 80 MMHG | BODY MASS INDEX: 31.18 KG/M2 | OXYGEN SATURATION: 94 % | WEIGHT: 165 LBS

## 2021-07-31 DIAGNOSIS — N39.0 ACUTE LOWER URINARY TRACT INFECTION: ICD-10-CM

## 2021-07-31 LAB
ALBUMIN SERPL-MCNC: 3 G/DL (ref 3.4–5)
ALBUMIN UR-MCNC: 100 MG/DL
ALP SERPL-CCNC: 116 U/L (ref 40–150)
ALT SERPL W P-5'-P-CCNC: 28 U/L (ref 0–50)
ANION GAP SERPL CALCULATED.3IONS-SCNC: 10 MMOL/L (ref 3–14)
APPEARANCE UR: ABNORMAL
AST SERPL W P-5'-P-CCNC: 20 U/L (ref 0–45)
BACTERIA #/AREA URNS HPF: ABNORMAL /HPF
BASOPHILS # BLD AUTO: 0 10E3/UL (ref 0–0.2)
BASOPHILS NFR BLD AUTO: 0 %
BILIRUB SERPL-MCNC: 0.8 MG/DL (ref 0.2–1.3)
BILIRUB UR QL STRIP: NEGATIVE
BUN SERPL-MCNC: 12 MG/DL (ref 7–30)
CALCIUM SERPL-MCNC: 8.5 MG/DL (ref 8.5–10.1)
CHLORIDE BLD-SCNC: 101 MMOL/L (ref 94–109)
CO2 SERPL-SCNC: 25 MMOL/L (ref 20–32)
COLOR UR AUTO: ABNORMAL
CREAT SERPL-MCNC: 0.83 MG/DL (ref 0.52–1.04)
EOSINOPHIL # BLD AUTO: 0 10E3/UL (ref 0–0.7)
EOSINOPHIL NFR BLD AUTO: 0 %
ERYTHROCYTE [DISTWIDTH] IN BLOOD BY AUTOMATED COUNT: 12.3 % (ref 10–15)
GFR SERPL CREATININE-BSD FRML MDRD: 76 ML/MIN/1.73M2
GLUCOSE BLD-MCNC: 139 MG/DL (ref 70–99)
GLUCOSE UR STRIP-MCNC: NEGATIVE MG/DL
HCT VFR BLD AUTO: 38.1 % (ref 35–47)
HGB BLD-MCNC: 12.9 G/DL (ref 11.7–15.7)
HGB UR QL STRIP: ABNORMAL
HOLD SPECIMEN: NORMAL
IMM GRANULOCYTES # BLD: 0.1 10E3/UL
IMM GRANULOCYTES NFR BLD: 1 %
KETONES UR STRIP-MCNC: 20 MG/DL
LACTATE SERPL-SCNC: 1.1 MMOL/L (ref 0.7–2)
LEUKOCYTE ESTERASE UR QL STRIP: ABNORMAL
LYMPHOCYTES # BLD AUTO: 0.6 10E3/UL (ref 0.8–5.3)
LYMPHOCYTES NFR BLD AUTO: 5 %
MCH RBC QN AUTO: 29.5 PG (ref 26.5–33)
MCHC RBC AUTO-ENTMCNC: 33.9 G/DL (ref 31.5–36.5)
MCV RBC AUTO: 87 FL (ref 78–100)
MONOCYTES # BLD AUTO: 1 10E3/UL (ref 0–1.3)
MONOCYTES NFR BLD AUTO: 7 %
MUCOUS THREADS #/AREA URNS LPF: PRESENT /LPF
NEUTROPHILS # BLD AUTO: 11.3 10E3/UL (ref 1.6–8.3)
NEUTROPHILS NFR BLD AUTO: 87 %
NITRATE UR QL: NEGATIVE
NRBC # BLD AUTO: 0 10E3/UL
NRBC BLD AUTO-RTO: 0 /100
PH UR STRIP: 6 [PH] (ref 5–7)
PLATELET # BLD AUTO: 243 10E3/UL (ref 150–450)
POTASSIUM BLD-SCNC: 3.2 MMOL/L (ref 3.4–5.3)
PROT SERPL-MCNC: 7.2 G/DL (ref 6.8–8.8)
RBC # BLD AUTO: 4.38 10E6/UL (ref 3.8–5.2)
RBC URINE: 42 /HPF
SODIUM SERPL-SCNC: 136 MMOL/L (ref 133–144)
SP GR UR STRIP: 1.01 (ref 1–1.03)
SQUAMOUS EPITHELIAL: 18 /HPF
UROBILINOGEN UR STRIP-MCNC: NORMAL MG/DL
WBC # BLD AUTO: 12.9 10E3/UL (ref 4–11)
WBC CLUMPS #/AREA URNS HPF: PRESENT /HPF
WBC URINE: >182 /HPF

## 2021-07-31 PROCEDURE — 81003 URINALYSIS AUTO W/O SCOPE: CPT | Performed by: EMERGENCY MEDICINE

## 2021-07-31 PROCEDURE — 99284 EMERGENCY DEPT VISIT MOD MDM: CPT | Mod: 25 | Performed by: EMERGENCY MEDICINE

## 2021-07-31 PROCEDURE — 36415 COLL VENOUS BLD VENIPUNCTURE: CPT | Performed by: EMERGENCY MEDICINE

## 2021-07-31 PROCEDURE — 96361 HYDRATE IV INFUSION ADD-ON: CPT | Performed by: EMERGENCY MEDICINE

## 2021-07-31 PROCEDURE — 83605 ASSAY OF LACTIC ACID: CPT | Performed by: EMERGENCY MEDICINE

## 2021-07-31 PROCEDURE — 250N000013 HC RX MED GY IP 250 OP 250 PS 637: Performed by: EMERGENCY MEDICINE

## 2021-07-31 PROCEDURE — 87149 DNA/RNA DIRECT PROBE: CPT | Performed by: EMERGENCY MEDICINE

## 2021-07-31 PROCEDURE — 96365 THER/PROPH/DIAG IV INF INIT: CPT | Performed by: EMERGENCY MEDICINE

## 2021-07-31 PROCEDURE — 250N000011 HC RX IP 250 OP 636: Performed by: EMERGENCY MEDICINE

## 2021-07-31 PROCEDURE — 99284 EMERGENCY DEPT VISIT MOD MDM: CPT | Performed by: EMERGENCY MEDICINE

## 2021-07-31 PROCEDURE — 80053 COMPREHEN METABOLIC PANEL: CPT | Performed by: EMERGENCY MEDICINE

## 2021-07-31 PROCEDURE — 96375 TX/PRO/DX INJ NEW DRUG ADDON: CPT | Performed by: EMERGENCY MEDICINE

## 2021-07-31 PROCEDURE — 85025 COMPLETE CBC W/AUTO DIFF WBC: CPT | Performed by: EMERGENCY MEDICINE

## 2021-07-31 PROCEDURE — 258N000003 HC RX IP 258 OP 636: Performed by: EMERGENCY MEDICINE

## 2021-07-31 PROCEDURE — 87186 SC STD MICRODIL/AGAR DIL: CPT | Performed by: EMERGENCY MEDICINE

## 2021-07-31 PROCEDURE — 87086 URINE CULTURE/COLONY COUNT: CPT | Performed by: EMERGENCY MEDICINE

## 2021-07-31 RX ORDER — PHENAZOPYRIDINE HYDROCHLORIDE 100 MG/1
200 TABLET, FILM COATED ORAL ONCE
Status: COMPLETED | OUTPATIENT
Start: 2021-07-31 | End: 2021-07-31

## 2021-07-31 RX ORDER — ONDANSETRON 4 MG/1
4 TABLET, ORALLY DISINTEGRATING ORAL EVERY 8 HOURS PRN
Qty: 12 TABLET | Refills: 0 | Status: SHIPPED | OUTPATIENT
Start: 2021-07-31 | End: 2021-11-01

## 2021-07-31 RX ORDER — CEFTRIAXONE SODIUM 2 G/50ML
2 INJECTION, SOLUTION INTRAVENOUS ONCE
Status: COMPLETED | OUTPATIENT
Start: 2021-07-31 | End: 2021-07-31

## 2021-07-31 RX ORDER — PHENAZOPYRIDINE HYDROCHLORIDE 200 MG/1
200 TABLET, FILM COATED ORAL 3 TIMES DAILY PRN
Qty: 12 TABLET | Refills: 0 | Status: SHIPPED | OUTPATIENT
Start: 2021-07-31 | End: 2021-08-12

## 2021-07-31 RX ORDER — SULFAMETHOXAZOLE/TRIMETHOPRIM 800-160 MG
1 TABLET ORAL 2 TIMES DAILY
Qty: 20 TABLET | Refills: 0 | Status: SHIPPED | OUTPATIENT
Start: 2021-07-31 | End: 2021-08-10

## 2021-07-31 RX ORDER — KETOROLAC TROMETHAMINE 15 MG/ML
15 INJECTION, SOLUTION INTRAMUSCULAR; INTRAVENOUS ONCE
Status: COMPLETED | OUTPATIENT
Start: 2021-07-31 | End: 2021-07-31

## 2021-07-31 RX ORDER — ONDANSETRON 2 MG/ML
4 INJECTION INTRAMUSCULAR; INTRAVENOUS ONCE
Status: COMPLETED | OUTPATIENT
Start: 2021-07-31 | End: 2021-07-31

## 2021-07-31 RX ADMIN — ONDANSETRON 4 MG: 2 INJECTION INTRAMUSCULAR; INTRAVENOUS at 18:07

## 2021-07-31 RX ADMIN — SODIUM CHLORIDE 1000 ML: 9 INJECTION, SOLUTION INTRAVENOUS at 17:37

## 2021-07-31 RX ADMIN — PHENAZOPYRIDINE 200 MG: 100 TABLET ORAL at 18:08

## 2021-07-31 RX ADMIN — KETOROLAC TROMETHAMINE 15 MG: 15 INJECTION, SOLUTION INTRAMUSCULAR; INTRAVENOUS at 19:39

## 2021-07-31 RX ADMIN — CEFTRIAXONE SODIUM 2 G: 2 INJECTION, SOLUTION INTRAVENOUS at 18:08

## 2021-07-31 NOTE — ED NOTES
Pt presents with dysuria x 4 days with intermittent nausea, vomiting. Pt reports intermittent fevers of well ranging from 100 to 104.

## 2021-07-31 NOTE — TELEPHONE ENCOUNTER
Foot and ankle surgery July 14, 21.    Saw Dr. Esquivel yesterday, and said patient should speak to PCP.  Unable to control bladder, and no warning when she has to urinate. Urgency and frequency and strong smelling.  Temp down to 99.8. Has been vomiting too but Dr. Hammonds is aware, and is able to keep down fluids.    For symptoms, should see provider within 24 hours.  Caller verbalized understanding and patient will try e-visit or go to .    Saloni Mccallum RN  [place] [facility]      Reason for Disposition    [1] Can't control passage of urine (i.e., urinary incontinence) AND [2] new onset (< 2 weeks) or worsening    Additional Information    Negative: Shock suspected (e.g., cold/pale/clammy skin, too weak to stand, low BP, rapid pulse)    Negative: Sounds like a life-threatening emergency to the triager    Negative: [1] Unable to urinate (or only a few drops) > 4 hours AND     [2] bladder feels very full (e.g., palpable bladder or strong urge to urinate)    Negative: [1] Decreased urination and [2] drinking very little AND [2] dehydration suspected (e.g., dark urine, no urine > 12 hours, very dry mouth, very lightheaded)    Negative: Patient sounds very sick or weak to the triager    Negative: Fever > 100.4 F (38.0 C)    Negative: Side (flank) or lower back pain present    Protocols used: URINARY SYMPTOMS-A-AH

## 2021-08-01 ENCOUNTER — HOSPITAL ENCOUNTER (EMERGENCY)
Facility: CLINIC | Age: 61
Discharge: HOME | End: 2021-08-01
Attending: FAMILY MEDICINE | Admitting: FAMILY MEDICINE
Payer: COMMERCIAL

## 2021-08-01 VITALS
TEMPERATURE: 100.8 F | DIASTOLIC BLOOD PRESSURE: 65 MMHG | RESPIRATION RATE: 18 BRPM | HEART RATE: 85 BPM | SYSTOLIC BLOOD PRESSURE: 119 MMHG | OXYGEN SATURATION: 98 %

## 2021-08-01 DIAGNOSIS — E87.6 HYPOKALEMIA: ICD-10-CM

## 2021-08-01 DIAGNOSIS — R78.81 E COLI BACTEREMIA: ICD-10-CM

## 2021-08-01 DIAGNOSIS — N10 ACUTE PYELONEPHRITIS: ICD-10-CM

## 2021-08-01 DIAGNOSIS — B96.20 E COLI BACTEREMIA: ICD-10-CM

## 2021-08-01 LAB
ACINETOBACTER SPECIES: NOT DETECTED
ANION GAP SERPL CALCULATED.3IONS-SCNC: 7 MMOL/L (ref 3–14)
BACTERIA UR CULT: NORMAL
BASOPHILS # BLD AUTO: 0 10E3/UL (ref 0–0.2)
BASOPHILS NFR BLD AUTO: 0 %
BUN SERPL-MCNC: 14 MG/DL (ref 7–30)
CALCIUM SERPL-MCNC: 8.5 MG/DL (ref 8.5–10.1)
CHLORIDE BLD-SCNC: 104 MMOL/L (ref 94–109)
CITROBACTER SPECIES: NOT DETECTED
CO2 SERPL-SCNC: 27 MMOL/L (ref 20–32)
CREAT SERPL-MCNC: 0.92 MG/DL (ref 0.52–1.04)
CTX-M: NOT DETECTED
ENTEROBACTER SPECIES: NOT DETECTED
EOSINOPHIL # BLD AUTO: 0 10E3/UL (ref 0–0.7)
EOSINOPHIL NFR BLD AUTO: 0 %
ERYTHROCYTE [DISTWIDTH] IN BLOOD BY AUTOMATED COUNT: 12.2 % (ref 10–15)
ESCHERICHIA COLI: DETECTED
GFR SERPL CREATININE-BSD FRML MDRD: 67 ML/MIN/1.73M2
GLUCOSE BLD-MCNC: 111 MG/DL (ref 70–99)
HCT VFR BLD AUTO: 35.1 % (ref 35–47)
HGB BLD-MCNC: 11.6 G/DL (ref 11.7–15.7)
IMM GRANULOCYTES # BLD: 0.1 10E3/UL
IMM GRANULOCYTES NFR BLD: 0 %
IMP: NOT DETECTED
KLEBSIELLA OXYTOCA: NOT DETECTED
KLEBSIELLA PNEUMONIAE: NOT DETECTED
KPC: NOT DETECTED
LYMPHOCYTES # BLD AUTO: 1.5 10E3/UL (ref 0.8–5.3)
LYMPHOCYTES NFR BLD AUTO: 13 %
MCH RBC QN AUTO: 28.8 PG (ref 26.5–33)
MCHC RBC AUTO-ENTMCNC: 33 G/DL (ref 31.5–36.5)
MCV RBC AUTO: 87 FL (ref 78–100)
MONOCYTES # BLD AUTO: 1 10E3/UL (ref 0–1.3)
MONOCYTES NFR BLD AUTO: 9 %
NDM: NOT DETECTED
NEUTROPHILS # BLD AUTO: 9.1 10E3/UL (ref 1.6–8.3)
NEUTROPHILS NFR BLD AUTO: 78 %
NRBC # BLD AUTO: 0 10E3/UL
NRBC BLD AUTO-RTO: 0 /100
OXA (DETECTED/NOT DETECTED): NOT DETECTED
PLATELET # BLD AUTO: 229 10E3/UL (ref 150–450)
POTASSIUM BLD-SCNC: 2.9 MMOL/L (ref 3.4–5.3)
PROTEUS SPECIES: NOT DETECTED
PSEUDOMONAS AERUGINOSA: NOT DETECTED
RBC # BLD AUTO: 4.03 10E6/UL (ref 3.8–5.2)
SODIUM SERPL-SCNC: 138 MMOL/L (ref 133–144)
VIM: NOT DETECTED
WBC # BLD AUTO: 11.7 10E3/UL (ref 4–11)

## 2021-08-01 PROCEDURE — 99284 EMERGENCY DEPT VISIT MOD MDM: CPT | Performed by: FAMILY MEDICINE

## 2021-08-01 PROCEDURE — 36415 COLL VENOUS BLD VENIPUNCTURE: CPT | Performed by: FAMILY MEDICINE

## 2021-08-01 PROCEDURE — 250N000013 HC RX MED GY IP 250 OP 250 PS 637: Performed by: FAMILY MEDICINE

## 2021-08-01 PROCEDURE — 250N000011 HC RX IP 250 OP 636: Performed by: FAMILY MEDICINE

## 2021-08-01 PROCEDURE — 99284 EMERGENCY DEPT VISIT MOD MDM: CPT | Mod: 25 | Performed by: FAMILY MEDICINE

## 2021-08-01 PROCEDURE — 96374 THER/PROPH/DIAG INJ IV PUSH: CPT | Performed by: FAMILY MEDICINE

## 2021-08-01 PROCEDURE — 85004 AUTOMATED DIFF WBC COUNT: CPT | Performed by: FAMILY MEDICINE

## 2021-08-01 PROCEDURE — 80048 BASIC METABOLIC PNL TOTAL CA: CPT | Performed by: FAMILY MEDICINE

## 2021-08-01 RX ORDER — POTASSIUM CHLORIDE 1500 MG/1
20 TABLET, EXTENDED RELEASE ORAL ONCE
Status: COMPLETED | OUTPATIENT
Start: 2021-08-01 | End: 2021-08-01

## 2021-08-01 RX ORDER — CEFTRIAXONE SODIUM 2 G/50ML
2 INJECTION, SOLUTION INTRAVENOUS ONCE
Status: COMPLETED | OUTPATIENT
Start: 2021-08-01 | End: 2021-08-01

## 2021-08-01 RX ADMIN — POTASSIUM CHLORIDE 20 MEQ: 20 TABLET, EXTENDED RELEASE ORAL at 15:13

## 2021-08-01 RX ADMIN — CEFTRIAXONE SODIUM 2 G: 2 INJECTION, SOLUTION INTRAVENOUS at 14:11

## 2021-08-01 NOTE — RESULT ENCOUNTER NOTE
Critical value note regarding Positive blood culture    Information below copied and pasted below from ED Triage Notes   [facility] Emergency Provider/Nurse:Luis Mondragon MD  Date and Time of call:  08/01/21 1221  Response/comment:  Received medication from nursing staff of positive blood culture with gram-negative bacilli on bottles 1 of 2.  Chart review shows that she was treated yesterday for urinary tract infection and discharged on Bactrim.  Speciation or sensitivities not yet available.  I spoke with the patient on the phone and notified her of the positive results.  Given these findings I recommended that she returns to the hospital for repeat blood cultures and IV antibiotics and likely hospitalization.  She expressed understanding and states she will come back to the emergency department at as soon as she is able.

## 2021-08-01 NOTE — RESULT ENCOUNTER NOTE
[facility] Emergency Dept discharge antibiotic (if prescribed): Sulfamethoxazole-Trimethoprim (Bactrim DS, Septra DS) 800-160 mg PO tablet,  1 tablet by mouth 2 times daily for 10 days.    Date of Rx (if applicable):  7/31/21  No changes in treatment per [facility] ED Lab Result Urine culture protocol.

## 2021-08-01 NOTE — ED NOTES
Temperature 102.2. MD made aware. Toradol ordered and given. Allergy to aspirin discussed with pt as contraindicated with Toradol. Pt stated she doesn't believe to have actual allergy to aspirin, states more of a possible reaction causing mouth sores. Pt educated if any reaction occurs after administration to hit call light and inform nurse.

## 2021-08-01 NOTE — ED PROVIDER NOTES
Received medication from nursing staff of positive blood culture with gram-negative bacilli on bottles 1 of 2.  Chart review shows that she was treated yesterday for urinary tract infection and discharged on Bactrim.  Speciation or sensitivities not yet available.  I spoke with the patient on the phone and notified her of the positive results.  Given these findings I recommended that she returns to the hospital for repeat blood cultures and IV antibiotics and likely hospitalization.  She expressed understanding and states she will come back to the emergency department at as soon as she is able.      Luis Mondragon MD    8/1/2021 12:21 PM      Luis Mondragon MD  08/01/21 1221

## 2021-08-01 NOTE — DISCHARGE INSTRUCTIONS
Return to your normal diet and be sure to eat foods rich in potassium.  Begin taking your antibiotic tomorrow.  You do not need to take it today.  Return to be seen if fevers return after 24 hours greater than 100.4, if you have vomiting, flank pain, or other significant symptoms.

## 2021-08-01 NOTE — ED PROVIDER NOTES
History     Chief Complaint   Patient presents with     Abnormal Labs     called with postive blood culture. reports feeling much better today      HPI    Muriel Porras is a 61 year old female who comes in at our request because of positive blood cultures.  Blood culture currently is showing E. coli.  She was seen yesterday with symptoms of upper tract urinary tract infection.  She received 2 g of ceftriaxone in the ED and was discharged to take trimethoprim sulfamethoxazole.  She did not show signs of sepsis at that time.  Lactate was normal.  Other blood studies were reassuring.  She has not yet started the trimethoprim sulfamethoxazole.  She reports feeling improved.  She has had low-grade fevers.  However her energy level is improved and she was able to take a shower this morning.  She is not having any respiratory symptoms including no shortness of breath or cough.  No abdominal pain, nausea, vomiting.  She has no history of complicated urinary tract infection or instrumentation.  She had occasional UTIs, commonly after a few surgeries including a shoulder surgery and a breast reduction surgery.  She had foot surgery a few weeks ago.  She has not had Covid infection.  She has had Covid vaccination.  Urine culture is pending.  There no previous urine cultures to guide therapy.    Allergies:  Allergies   Allergen Reactions     Aspirin      Mouth lesions      Tylenol [Acetaminophen] Nausea and Vomiting       Problem List:    Patient Active Problem List    Diagnosis Date Noted     Acquired flat foot, left 05/10/2021     Priority: Medium     Added automatically from request for surgery [id number]       Osteopenia of both hips 07/15/2019     Priority: Medium     Hypothyroidism due to acquired atrophy of thyroid 04/28/2016     Priority: Medium        Past Medical History:    Past Medical History:   Diagnosis Date     Hypothyroidism (acquired)        Past Surgical History:    Past Surgical History:   Procedure  Laterality Date     COLONOSCOPY WITH CO2 INSUFFLATION N/A 2019    Procedure: COLONOSCOPY, WITH CO2 INSUFFLATION;  Surgeon: Marshall Kaba DO;  Location: [facility] OR     RECONSTRUCT FLAT FOOT Left 2021    Procedure: LEFT FLATFOOT RECONSTRUCTION;  Surgeon: Marko Maya DPM;  Location: [facility] OR       Family History:    Family History   Problem Relation Age of Onset     Colon Cancer Maternal Grandfather      Cerebrovascular Disease Mother 84     Myocardial Infarction Father         Stents     Pacemaker Father      Hypothyroidism Father      Hypothyroidism Sister      Cancer Sister      No Known Problems Brother      Colon Polyps Daughter      Hypothyroidism Sister      No Known Problems Sister        Social History:  Marital Status:   [2]  Social History     Tobacco Use     Smoking status: Former Smoker     Packs/day: 1.00     Types: Cigarettes     Quit date: 2016     Years since quittin.3     Smokeless tobacco: Never Used   Substance Use Topics     Alcohol use: Yes     Alcohol/week: 0.0 standard drinks     Comment: rare     Drug use: No        Medications:    Acetaminophen (TYLENOL) 325 MG CAPS  dexamethasone (DECADRON) 0.5 MG/5ML elixir  levothyroxine (SYNTHROID/LEVOTHROID) 175 MCG tablet  meclizine (ANTIVERT) 25 MG tablet  ondansetron (ZOFRAN ODT) 4 MG ODT tab  oxyCODONE (ROXICODONE) 5 MG tablet  phenazopyridine (PYRIDIUM) 200 MG tablet  sulfamethoxazole-trimethoprim (BACTRIM DS) 800-160 MG tablet  vitamin D3 (CHOLECALCIFEROL) 1.25 MG (34665 UT) capsule          Review of Systems  All other systems are reviewed and are negative    Physical Exam   BP: 118/60  Pulse: 86  Temp: (!) 100.8  F (38.2  C)  Resp: 18  SpO2: 97 %      Physical Exam    Nursing note and vitals were reviewed.  Constitutional: Awake and alert, adequately nourished and developed appearing 61-year-old in no apparent discomfort, who does not appear acutely ill, and who answers questions appropriately and cooperates with  examination.  HEENT: EOMI.   Neck: Freely mobile.  Cardiovascular: Cardiac examination reveals normal heart rate and regular rhythm without murmur.  Pulmonary/Chest: Breathing is unlabored.  Breath sounds are clear and equal bilaterally.  There no retractions, tachypnea, rales, wheezes, or rhonchi.  Abdomen: Soft, nontender, no HSM or masses rebound or guarding.  Musculoskeletal: Extremities are warm and well-perfused and without edema  Neurological: Alert, oriented, thought content logical, coherent   Skin: Warm, dry, no rashes.  Psychiatric: Affect broad and appropriate.    ED Course        Procedures              Critical Care time:  none               Results for orders placed or performed during the hospital encounter of 08/01/21 (from the past 24 hour(s))   CBC with platelets differential    Narrative    The following orders were created for panel order CBC with platelets differential.  Procedure                               Abnormality         Status                     ---------                               -----------         ------                     CBC with platelets and d...[[id number]]  Abnormal            Final result                 Please view results for these tests on the individual orders.   Basic metabolic panel   Result Value Ref Range    Sodium 138 133 - 144 mmol/L    Potassium 2.9 (L) 3.4 - 5.3 mmol/L    Chloride 104 94 - 109 mmol/L    Carbon Dioxide (CO2) 27 20 - 32 mmol/L    Anion Gap 7 3 - 14 mmol/L    Urea Nitrogen 14 7 - 30 mg/dL    Creatinine 0.92 0.52 - 1.04 mg/dL    Calcium 8.5 8.5 - 10.1 mg/dL    Glucose 111 (H) 70 - 99 mg/dL    GFR Estimate 67 >60 mL/min/1.73m2   CBC with platelets and differential   Result Value Ref Range    WBC Count 11.7 (H) 4.0 - 11.0 10e3/uL    RBC Count 4.03 3.80 - 5.20 10e6/uL    Hemoglobin 11.6 (L) 11.7 - 15.7 g/dL    Hematocrit 35.1 35.0 - 47.0 %    MCV 87 78 - 100 fL    MCH 28.8 26.5 - 33.0 pg    MCHC 33.0 31.5 - 36.5 g/dL    RDW 12.2 10.0 - 15.0 %     Platelet Count 229 150 - 450 10e3/uL    % Neutrophils 78 %    % Lymphocytes 13 %    % Monocytes 9 %    % Eosinophils 0 %    % Basophils 0 %    % Immature Granulocytes 0 %    NRBCs per 100 WBC 0 <1 /100    Absolute Neutrophils 9.1 (H) 1.6 - 8.3 10e3/uL    Absolute Lymphocytes 1.5 0.8 - 5.3 10e3/uL    Absolute Monocytes 1.0 0.0 - 1.3 10e3/uL    Absolute Eosinophils 0.0 0.0 - 0.7 10e3/uL    Absolute Basophils 0.0 0.0 - 0.2 10e3/uL    Absolute Immature Granulocytes 0.1 (H) <=0.0 10e3/uL    Absolute NRBCs 0.0 10e3/uL       Medications   potassium chloride ER (KLOR-CON M) CR tablet 20 mEq (has no administration in time range)   cefTRIAXone IN D5W (ROCEPHIN) intermittent infusion 2 g (2 g Intravenous New Bag 8/1/21 1411)       Assessments & Plan (with Medical Decision Making)     61-year-old female who presented with symptoms of lower urinary tract infection yesterday showed E. coli bacteremia on blood cultures today.  She had received ceftriaxone yesterday and is clinically dramatically improved today.  Despite this, for an extra measure of safety, we gave her an additional 2 g dose of Rocephin in the ED today.  Laboratory testing is reassuring.  Potassium is mildly low likely due to nutritional deficiency from poor oral intake which she has had over the past 3 days.  We gave her a dose of 20 mEq of potassium in the ED and she will eat potassium rich foods and return to a normal diet that I expect this to correct without difficulty.  I discussed with her that this type of bacteremia is easily cleared with antibiotic therapy when due to urinary tract infection and that she should continue to improve.  She will begin taking the antibiotics prescribed yesterday (trimethoprim sulfamethoxazole) tomorrow.  She does not need to take them today.  We will notify her if sensitivities require change in antibiotic therapy.  She should return if she does not continue to improve and especially if she develops fevers, chills, nausea,  vomiting, flank pain, or other symptoms of severe illness.  She expressed understanding and her questions were answered.    I have reviewed the nursing notes.    I have reviewed the findings, diagnosis, plan and need for follow up with the patient.       New Prescriptions    No medications on file       Final diagnoses:   E coli bacteremia   Acute pyelonephritis   Hypokalemia       8/1/2021   [facility] EMERGENCY DEPT     Moreno Terry MD  08/01/21 4010

## 2021-08-01 NOTE — ED NOTES
Feeling better overall since visit yesterday, still having low grade fevers, called due to +BC for antibiotic change.

## 2021-08-02 NOTE — RESULT ENCOUNTER NOTE
Final urine culture report is negative.  Adult Negative Urine culture parameters per protocol: Any # Urogenital single or mixed organism, <10,000 col/ml single organism (cath specimen), and <50,000 col/ml single organism (midstream or cath specimen).  [facility] Emergency Dept discharge antibiotic prescribed (If applicable): Bactrim DS  Treatment recommendations per [facility] ED Lab Result Urine Culture protocol.

## 2021-08-03 LAB
BACTERIA BLD CULT: ABNORMAL
BACTERIA BLD CULT: ABNORMAL

## 2021-08-04 NOTE — RESULT ENCOUNTER NOTE
Final BLOOD culture on 8/3/21 shows the presence of bacteria(s): Escherichia coli, which is [SUSCEPTIBLE] to antibiotic.  [facility] Emergency Dept discharge antibiotic prescribed: Sulfamethoxazole-Trimethoprim (Bactrim DS, Septra DS) 800-160 mg PO tablet,  1 tablet by mouth 2 times daily for 10 days.  Recommendations in Treatment per [facility] ED lab result Blood culture protocol

## 2021-08-05 ENCOUNTER — OFFICE VISIT (OUTPATIENT)
Dept: PODIATRY | Facility: CLINIC | Age: 61
End: 2021-08-05
Payer: COMMERCIAL

## 2021-08-05 VITALS
WEIGHT: 165 LBS | HEIGHT: 61 IN | SYSTOLIC BLOOD PRESSURE: 116 MMHG | DIASTOLIC BLOOD PRESSURE: 74 MMHG | BODY MASS INDEX: 31.15 KG/M2

## 2021-08-05 DIAGNOSIS — Z09 SURGERY FOLLOW-UP EXAMINATION: ICD-10-CM

## 2021-08-05 DIAGNOSIS — R79.89 BLOOD CULTURE POSITIVE FOR MICROORGANISM: Primary | ICD-10-CM

## 2021-08-05 DIAGNOSIS — G89.18 POST-OPERATIVE PAIN: ICD-10-CM

## 2021-08-05 LAB — BACTERIA BLD CULT: NO GROWTH

## 2021-08-05 PROCEDURE — 99024 POSTOP FOLLOW-UP VISIT: CPT | Performed by: PODIATRIST

## 2021-08-05 RX ORDER — SULFAMETHOXAZOLE/TRIMETHOPRIM 800-160 MG
1 TABLET ORAL 2 TIMES DAILY
Qty: 22 TABLET | Refills: 0 | Status: SHIPPED | OUTPATIENT
Start: 2021-08-05 | End: 2021-11-01

## 2021-08-05 ASSESSMENT — MIFFLIN-ST. JEOR: SCORE: 1245.82

## 2021-08-05 NOTE — PROGRESS NOTES
"Foot & Ankle Surgery  August 5, 2021    S:  Patient in today sp left gastrocnemius recession, PT tendon debridement and repair, talonavicular fusion on 7/14/2021.  Pain levels improved.  She recently was having significant issues with constitutional symptoms.  She was recently noted to have a UTI and positive blood cultures.  She is currently on Bactrim day 6 of a 10-day course and is feeling much improved.  She was seen by Dr. Hammonds approximately a week ago for attempted staple removal but because of some gapping, Steri-Strips were applied and the remaining staples were left intact    /74   Ht 1.549 m (5' 1\")   Wt 74.8 kg (165 lb)   LMP 09/01/2014   BMI 31.18 kg/m        ROS - positive for CC.  Patient denies current nausea, vomiting, chills, fevers, belly pain, calf pain, chest pain or SOB.  Complete remainder of ROS is otherwise neg.    PE -remaining staples were removed without gapping although Steri-Strips were applied to the distal aspect of the medial incision.  Minimal swelling noted today.  There is 0 signs of infection today.  Skin shows no trophic, color or temperature changes otherwise.  No calf redness, swelling or pain noted otherwise.    A/P - 61 year old yo patient approx 3 weeks sp above procedure  -Remaining staples were removed.  Steri-Strips were previously placed at the proximal and distal aspect of the medial ankle incision.  Further Steri-Strips were applied to the distal aspect of the incision but the incision otherwise looks wonderful today  -Continue: Nonweightbearing; DVT exercises; compression, Tensogrip dispensed  -Change: Ice/elevate as needed based on pain; okay to increase activity levels to tolerance; okay to wash starting tomorrow but avoid soaking/submerging x1 week  -Regarding her positive blood cultures, she grew E. coli susceptible to Bactrim.  I told her that I think extending the oral antibiotic course beyond the original 10 days is appropriate.  She was given an " additional 11 days of twice daily Bactrim for a total course of 3 weeks.  This will hopefully minimize the possibility of bacteria seeding the implants      Follow up  -3 weeks for x-rays or sooner with acute issues    Plan for [id number]-maim -she is cleared to start working from home.  We discussed appropriate elevation while working from home    Marko Maya DPM FACSt. Vincent[facility] East FACFA  Podiatric Foot & Ankle Surgeon  [facility]  [phone]    Disclaimer: This note consists of symbols derived from keyboarding, dictation and/or voice recognition software. As a result, there may be errors in the script that have gone undetected. Please consider this when interpreting information found in this chart.

## 2021-08-16 ENCOUNTER — TELEPHONE (OUTPATIENT)
Dept: FAMILY MEDICINE | Facility: CLINIC | Age: 61
End: 2021-08-16

## 2021-08-16 NOTE — TELEPHONE ENCOUNTER
I see 8/1/21 ED visit, on oral antibiotic for UTI.   She was seen 8/5/21 by podiatry, due for follow up with Dr. Maya around 8/26/21.  I see 9/13 podiatry follow up scheduled.    She had extended course of the BID bactrim for positive blood culture.    Routed to Shruthi Osorio to advise, patient now reporting decreased appetite and nausea.    Do you want to see her, virtual visit, labs?    Rama Molina RN  [facility]

## 2021-08-16 NOTE — TELEPHONE ENCOUNTER
Reason for Call:  Other     Detailed comments: Patient said that she is having symptoms of no appetite and nausea. Had been diagnosed with sepsis from a UTI she was seen for in January at the ER. Please advise       Phone Number Patient can be reached at: Home number on file [phone] (home)    Best Time:     Can we leave a detailed message on this number? YES    Call taken on 8/16/2021 at 8:09 AM by Laxmi Hidalgo

## 2021-08-16 NOTE — TELEPHONE ENCOUNTER
I called and spoke to patient.   She says the nausea and lack of appetite has been going on for at least 2 weeks.    She still has a week left of the BID bactrim.    Denies vomiting or diarrhea.   Says she still sometimes feels a bit chilled and warm.    Is hydrated, urinating normally.    Says she also is worried about a pain behind her right knee noted for the last couple of days.     Her foot/tendon surgery was to the left lower extremity.    Denies swelling or redness, denies chest pain or shortness of breath.    Scheduled in same day tomorrow as advised by PCP.   Advised ER visit if sudden acutely worsened.    Patient verbalized understanding of and agreement with plan.    Rama Molina RN  [facility]

## 2021-08-16 NOTE — TELEPHONE ENCOUNTER
I don't have any openings today. I can see her tmw in a same day slot if that works. Otherwise, she may need to utilize UC

## 2021-08-17 ENCOUNTER — OFFICE VISIT (OUTPATIENT)
Dept: FAMILY MEDICINE | Facility: CLINIC | Age: 61
End: 2021-08-17
Payer: COMMERCIAL

## 2021-08-17 VITALS
SYSTOLIC BLOOD PRESSURE: 107 MMHG | OXYGEN SATURATION: 96 % | BODY MASS INDEX: 30.61 KG/M2 | HEART RATE: 63 BPM | RESPIRATION RATE: 16 BRPM | TEMPERATURE: 97.2 F | DIASTOLIC BLOOD PRESSURE: 70 MMHG | WEIGHT: 162 LBS

## 2021-08-17 DIAGNOSIS — Z98.890 S/P FOOT SURGERY: ICD-10-CM

## 2021-08-17 DIAGNOSIS — E87.6 HYPOKALEMIA: ICD-10-CM

## 2021-08-17 DIAGNOSIS — A41.51 SEPSIS DUE TO ESCHERICHIA COLI WITHOUT ACUTE ORGAN DYSFUNCTION (H): Primary | ICD-10-CM

## 2021-08-17 LAB
ANION GAP SERPL CALCULATED.3IONS-SCNC: 8 MMOL/L (ref 3–14)
BASOPHILS # BLD AUTO: 0.1 10E3/UL (ref 0–0.2)
BASOPHILS NFR BLD AUTO: 1 %
BUN SERPL-MCNC: 22 MG/DL (ref 7–30)
CALCIUM SERPL-MCNC: 9.4 MG/DL (ref 8.5–10.1)
CHLORIDE BLD-SCNC: 103 MMOL/L (ref 94–109)
CO2 SERPL-SCNC: 24 MMOL/L (ref 20–32)
CREAT SERPL-MCNC: 1.12 MG/DL (ref 0.52–1.04)
EOSINOPHIL # BLD AUTO: 0.2 10E3/UL (ref 0–0.7)
EOSINOPHIL NFR BLD AUTO: 4 %
ERYTHROCYTE [DISTWIDTH] IN BLOOD BY AUTOMATED COUNT: 12.4 % (ref 10–15)
GFR SERPL CREATININE-BSD FRML MDRD: 53 ML/MIN/1.73M2
GLUCOSE BLD-MCNC: 92 MG/DL (ref 70–99)
HCT VFR BLD AUTO: 38 % (ref 35–47)
HGB BLD-MCNC: 12.9 G/DL (ref 11.7–15.7)
LYMPHOCYTES # BLD AUTO: 1.8 10E3/UL (ref 0.8–5.3)
LYMPHOCYTES NFR BLD AUTO: 32 %
MCH RBC QN AUTO: 28.9 PG (ref 26.5–33)
MCHC RBC AUTO-ENTMCNC: 33.9 G/DL (ref 31.5–36.5)
MCV RBC AUTO: 85 FL (ref 78–100)
MONOCYTES # BLD AUTO: 0.5 10E3/UL (ref 0–1.3)
MONOCYTES NFR BLD AUTO: 8 %
NEUTROPHILS # BLD AUTO: 3 10E3/UL (ref 1.6–8.3)
NEUTROPHILS NFR BLD AUTO: 55 %
PLATELET # BLD AUTO: 164 10E3/UL (ref 150–450)
POTASSIUM BLD-SCNC: 4.2 MMOL/L (ref 3.4–5.3)
RBC # BLD AUTO: 4.46 10E6/UL (ref 3.8–5.2)
SODIUM SERPL-SCNC: 135 MMOL/L (ref 133–144)
WBC # BLD AUTO: 5.5 10E3/UL (ref 4–11)

## 2021-08-17 PROCEDURE — 36415 COLL VENOUS BLD VENIPUNCTURE: CPT | Performed by: PHYSICIAN ASSISTANT

## 2021-08-17 PROCEDURE — 99214 OFFICE O/P EST MOD 30 MIN: CPT | Performed by: PHYSICIAN ASSISTANT

## 2021-08-17 PROCEDURE — 85025 COMPLETE CBC W/AUTO DIFF WBC: CPT | Performed by: PHYSICIAN ASSISTANT

## 2021-08-17 PROCEDURE — 80048 BASIC METABOLIC PNL TOTAL CA: CPT | Performed by: PHYSICIAN ASSISTANT

## 2021-08-17 PROCEDURE — 87040 BLOOD CULTURE FOR BACTERIA: CPT | Performed by: PHYSICIAN ASSISTANT

## 2021-08-17 NOTE — PROGRESS NOTES
"    Assessment & Plan     1. Sepsis due to Escherichia coli without acute organ dysfunction (H)    2. Hypokalemia    3. S/P foot surgery        1,2) ER records reviewed. Will repeat labs and blood cultures today. She continues on Bactrim DS.    3) I believer her posterior knee discomfort, low back discomfort, and upper back pains are related to using the scooter. The scooter is not the right height for her either. If these symptoms worsen in any way she'll let me know.       Ordering of each unique test       BMI:   Estimated body mass index is 30.61 kg/m  as calculated from the following:    Height as of 8/5/21: 1.549 m (5' 1\").    Weight as of this encounter: 73.5 kg (162 lb).       Return in about 1 week (around 8/24/2021), or if symptoms worsen or fail to improve.    LAY [facility] ERNESTO Llamas is a 61 year old who presents for the following health issues     HPI                 Symptoms: cc Present Absent Comment   Fever/Chills  x  Chills, sweats   Fatigue  x     Muscle Aches  x     Eye Irritation   x    Sneezing   x    [place]/Drg   x    Sinus Pressure/Pain   x    Loss of smell   x    Dental pain   x    Sore Throat   x    Swollen Glands   x    Ear Pain/Fullness   x    Cough   x    Wheeze   x    Chest Pain   x    Shortness of breath   x    Rash   x    Other  x  Loose stools, back pain worse with deep breath-however improves after taking deep breath, nausea,     Symptom duration:  x1 wk   Sympom severity:  some are improving (nausea, chills, sweats) and worsening (back)   Treatments tried:  OTC    Contacts: none         Triage note from 8/16/21:  She says the nausea and lack of appetite has been going on for at least 2 weeks.    She still has a week left of the BID bactrim.    Denies vomiting or diarrhea.   Says she still sometimes feels a bit chilled and warm.    Is hydrated, urinating normally  Says she also is worried about a pain behind her right knee " noted for the last couple of days.     Her foot/tendon surgery was to the left lower extremity.  Denies swelling or redness, denies chest pain or shortness of breath.      ED/UC Followup:    Facility:  [facility] ED  Date of visit: 08/01/21  Reason for visit: abnormal labs  Current Status: see ENT note    Abx course was extended by foot/ankle surgeon  No longer experiencing septic sxs    61-year-old female who presented with symptoms of lower urinary tract infection yesterday showed E. coli bacteremia on blood cultures today.  She had received ceftriaxone yesterday and is clinically dramatically improved today.  Despite this, for an extra measure of safety, we gave her an additional 2 g dose of Rocephin in the ED today.  Laboratory testing is reassuring.  Potassium is mildly low likely due to nutritional deficiency from poor oral intake which she has had over the past 3 days.  We gave her a dose of 20 mEq of potassium in the ED and she will eat potassium rich foods and return to a normal diet that I expect this to correct without difficulty.  I discussed with her that this type of bacteremia is easily cleared with antibiotic therapy when due to urinary tract infection and that she should continue to improve.  She will begin taking the antibiotics prescribed yesterday (trimethoprim sulfamethoxazole) tomorrow.  She does not need to take them today.  We will notify her if sensitivities require change in antibiotic therapy.  She should return if she does not continue to improve and especially if she develops fevers, chills, nausea, vomiting, flank pain, or other symptoms of severe illness.  She expressed understanding and her questions were answered.       Musculoskeletal problem/pain  Onset/Duration: x1wk  Description  Location: knee(behind) - right  Joint Swelling: no  Redness: no  Pain: YES  Warmth: no  Intensity:  mild  Progression of Symptoms:  worsening and constant  Accompanying signs and symptoms:    Fevers: no fever in 3 days  Numbness/tingling/weakness: no  History  Trauma to the area: no  Recent illness:  YES  Previous similar problem: no  Previous evaluation:  no  Precipitating or alleviating factors:  Aggravating factors include: standing on knee scooter  Therapies tried and outcome:  OTC-leg cramp    Pain behind the knee occurs when she's using her scooter, improves when resting  Denies lower leg swelling    Pain into right shoulder blade  Happening daily x3 days  Somewhat painful when she tries to take a deep breath but then is resolved by taking a deep breath  Denies chest pain, shortness of breath         Review of Systems   Constitutional, cardiovascular, pulmonary, musculoskeletal, neuro, and psych systems are negative, except as otherwise noted.      Objective    /70   Pulse 63   Temp 97.2  F (36.2  C) (Tympanic)   Resp 16   Wt 73.5 kg (162 lb)   LMP 09/01/2014   SpO2 96%   BMI 30.61 kg/m    Body mass index is 30.61 kg/m .  Physical Exam   GENERAL: healthy, alert and no distress  RESP: lungs clear to auscultation - no rales, rhonchi or wheezes  CV: regular rates and rhythm, normal S1 S2, no S3 or S4, no murmur, click or rub and no peripheral edema  MS: no gross musculoskeletal defects noted, no edema, Lower leg non tender  SKIN: no signs of infection surrounding the surgical wounds  PSYCH: mentation appears normal, affect normal/bright

## 2021-08-22 LAB — BACTERIA BLD CULT: NO GROWTH

## 2021-09-13 ENCOUNTER — OFFICE VISIT (OUTPATIENT)
Dept: PODIATRY | Facility: CLINIC | Age: 61
End: 2021-09-13
Payer: COMMERCIAL

## 2021-09-13 ENCOUNTER — ANCILLARY PROCEDURE (OUTPATIENT)
Dept: GENERAL RADIOLOGY | Facility: CLINIC | Age: 61
End: 2021-09-13
Attending: PODIATRIST
Payer: COMMERCIAL

## 2021-09-13 VITALS
HEIGHT: 61 IN | SYSTOLIC BLOOD PRESSURE: 130 MMHG | BODY MASS INDEX: 30.58 KG/M2 | WEIGHT: 162 LBS | DIASTOLIC BLOOD PRESSURE: 82 MMHG

## 2021-09-13 DIAGNOSIS — Z98.890 S/P FOOT SURGERY, LEFT: Primary | ICD-10-CM

## 2021-09-13 DIAGNOSIS — Z98.890 S/P FOOT SURGERY, LEFT: ICD-10-CM

## 2021-09-13 PROCEDURE — 99024 POSTOP FOLLOW-UP VISIT: CPT | Performed by: PODIATRIST

## 2021-09-13 PROCEDURE — 73630 X-RAY EXAM OF FOOT: CPT | Mod: LT | Performed by: RADIOLOGY

## 2021-09-13 ASSESSMENT — MIFFLIN-ST. JEOR: SCORE: 1232.21

## 2021-09-13 NOTE — PATIENT INSTRUCTIONS
Thank you for choosing [facility] Podiatry / Foot & Ankle Surgery!    [facility] LOCATIONS  [address] [facility] [address]   [place] [address] [place] [address]   [phone]       SET UP SURGERY: [phone]    APPOINTMENTS: [phone]    BILLING QUESTIONS: [phone]    FAX NUMBER: [phone]        Follow up: 3 weeks

## 2021-09-13 NOTE — PROGRESS NOTES
"Foot & Ankle Surgery  September 13, 2021    S:  Patient in today sp left gastrocnemius recession, PT tendon debridement and repair, talonavicular fusion on 7/14/2021.  Pain levels improving.  She was last seen on 8/5/2021 and advised on a 3-week follow-up.  At that point, we had extended her course of Bactrim because of a UTI and positive blood cultures.  She does notice some abnormal sensation along the lateral foot.    /82   Ht 1.549 m (5' 1\")   Wt 73.5 kg (162 lb)   LMP 09/01/2014   BMI 30.61 kg/m        ROS - positive for CC.  Patient denies current nausea, vomiting, chills, fevers, belly pain, calf pain, chest pain or SOB.  Complete remainder of ROS is otherwise neg.    PE -incisions are well-healed.  No pain with stressing of the talonavicular site.  Ankle range of motion approximately 0 degrees with the knee extended.  She does have paresthesias along the lateral aspect of the foot along the sural nerve distribution.  Skin shows no trophic, color or temperature changes otherwise.  No calf redness, swelling or pain noted otherwise.    Imaging -4 views nonweightbearing demonstrates intact hardware.  While the talonavicular joint line is still visible, the lateral demonstrates sclerosis and a less distinct joint line indicative of healing/bone bridging    A/P - 61 year old yo patient approx 8-1/2 weeks sp above procedure  -I personally reviewed and interpreted today's x-ray results  -The patient is cleared to start protected weightbearing in a boot with crutches and she will follow-up in 3 weeks for repeat x-rays.  Over that time, she can increase weight on the foot and phase off the crutches to tolerance with care to avoid exceeding the pain threshold at the surgical site with either the amount of weight or time on the foot as this can have a negative impact on healing  -We discussed sural nerve irritation causing lateral foot symptoms.  Advised 5-minute 3 times daily massage/manipulation of the " tissue to help desensitize the tissue.  We also discussed adding a short course of Neurontin but she declined for the time being.  -I anticipate physical therapy for functional rehab to start after next appointment  -No evidence of localized infection associated with her previous positive blood cultures, but we discussed that if she notices any localized redness, swelling or most importantly an increase in localized pain, she should notify clinic.    Follow up  -3 weeks or sooner with acute issues    Plan for [id number]-[id number] -she is already working from home      Marko Maya DPM FACFAS FACFAOM  Podiatric Foot & Ankle Surgeon  [facility]  [phone]    Disclaimer: This note consists of symbols derived from keyboarding, dictation and/or voice recognition software. As a result, there may be errors in the script that have gone undetected. Please consider this when interpreting information found in this chart.

## 2021-09-30 ENCOUNTER — OFFICE VISIT (OUTPATIENT)
Dept: PODIATRY | Facility: CLINIC | Age: 61
End: 2021-09-30
Payer: COMMERCIAL

## 2021-09-30 ENCOUNTER — ANCILLARY PROCEDURE (OUTPATIENT)
Dept: GENERAL RADIOLOGY | Facility: CLINIC | Age: 61
End: 2021-09-30
Attending: PODIATRIST
Payer: COMMERCIAL

## 2021-09-30 VITALS
BODY MASS INDEX: 30.58 KG/M2 | SYSTOLIC BLOOD PRESSURE: 128 MMHG | WEIGHT: 162 LBS | DIASTOLIC BLOOD PRESSURE: 70 MMHG | HEIGHT: 61 IN

## 2021-09-30 DIAGNOSIS — Z98.890 S/P FOOT SURGERY, LEFT: ICD-10-CM

## 2021-09-30 DIAGNOSIS — Z98.890 S/P FOOT SURGERY, LEFT: Primary | ICD-10-CM

## 2021-09-30 DIAGNOSIS — M76.822 POSTERIOR TIBIAL TENDINITIS OF LEFT LOWER EXTREMITY: ICD-10-CM

## 2021-09-30 PROCEDURE — 99024 POSTOP FOLLOW-UP VISIT: CPT | Performed by: PODIATRIST

## 2021-09-30 PROCEDURE — 73630 X-RAY EXAM OF FOOT: CPT | Mod: LT | Performed by: RADIOLOGY

## 2021-09-30 ASSESSMENT — MIFFLIN-ST. JEOR: SCORE: 1232.21

## 2021-09-30 NOTE — PROGRESS NOTES
"Foot & Ankle Surgery  September 30, 2021    S:  Patient in today sp left gastrocnemius recession, posterior tibial tendon debridement and repair and talonavicular fusion on 7/14/2021.  Pain levels minimal.  She was seen approximately 3 weeks ago and was cleared to start protected weightbearing in the boot.  She presents today partial weightbearing in the boot with her crutches.    /70   Ht 1.549 m (5' 1\")   Wt 73.5 kg (162 lb)   LMP 09/01/2014   BMI 30.61 kg/m        ROS - positive for CC.  Patient denies current nausea, vomiting, chills, fevers, belly pain, calf pain, chest pain or SOB.  Complete remainder of ROS is otherwise neg.    PE -incisions well-healed, good alignment of the arch.  No pain with stressing of the talonavicular fusion site.  Ankle range of motion again is approximately 0 degrees with the knee extended.  Skin shows no trophic, color or temperature changes otherwise.  No calf redness, swelling or pain noted otherwise.    Imaging -weightbearing views -intact hardware without evidence of loosening or fracture.  The fusion site is still partially visible dorsally, but there does appear to be bone bridging plantarly.    A/P - 61 year old yo patient approx 11 weeks sp above procedure  -I personally reviewed and interpreted today's x-ray results  -[facility] PT referral to start functional rehab  -Continue protected weightbearing in the boot with crutches and phase off the crutches/increase weight to tolerance  -She is given a letter today to continue working from home x4 weeks.  I anticipate she will be cleared to return to work for seated/nonweightbearing job duties after her next follow-up in 4 weeks    Follow up  -4 weeks or sooner with acute issues    Plan for [id number]-bz-aafo -currently working from home      Marko Maya DPM FACFAS FACFAOM  Podiatric Foot & Ankle Surgeon  [facility]  [phone]    Disclaimer: This note consists of symbols derived from keyboarding, " dictation and/or voice recognition software. As a result, there may be errors in the script that have gone undetected. Please consider this when interpreting information found in this chart.

## 2021-09-30 NOTE — PATIENT INSTRUCTIONS
Thank you for choosing [facility] Podiatry / Foot & Ankle Surgery!    [facility] LOCATIONS  [address] [facility] [address]   [place] [address] [place] [address]   [phone]       SET UP SURGERY: [phone]    APPOINTMENTS: [phone]    BILLING QUESTIONS: [phone]    FAX NUMBER: [phone]        PHYSICAL THERAPY REFERRAL  [facility] ([facility])  Located in most [facility]  Central [phone]

## 2021-10-09 ENCOUNTER — HEALTH MAINTENANCE LETTER (OUTPATIENT)
Age: 61
End: 2021-10-09

## 2021-10-12 ENCOUNTER — TELEPHONE (OUTPATIENT)
Dept: PODIATRY | Facility: CLINIC | Age: 61
End: 2021-10-12

## 2021-10-12 DIAGNOSIS — Z98.890 S/P FOOT SURGERY, LEFT: Primary | ICD-10-CM

## 2021-10-12 DIAGNOSIS — M21.42 ACQUIRED FLAT FOOT, LEFT: ICD-10-CM

## 2021-10-12 NOTE — TELEPHONE ENCOUNTER
Reason for call:  Patient is requesting extension on her prescription for roll-a-bout.    Home number on file [phone] (home)

## 2021-10-13 NOTE — TELEPHONE ENCOUNTER
Left voicemail for patient stating Dr. Maya is out of the office today and will be returning tomorrow. We have sent a request to the provider and will get back with her tomorrow once new order is placed.     Please see request for extension of rolling knee walker. Order pending.     GAETANO Nettles RN

## 2021-10-14 ENCOUNTER — THERAPY VISIT (OUTPATIENT)
Dept: PHYSICAL THERAPY | Facility: CLINIC | Age: 61
End: 2021-10-14
Attending: PODIATRIST
Payer: COMMERCIAL

## 2021-10-14 DIAGNOSIS — M76.822 POSTERIOR TIBIAL TENDINITIS OF LEFT LOWER EXTREMITY: ICD-10-CM

## 2021-10-14 DIAGNOSIS — Z98.890 S/P FOOT SURGERY, LEFT: ICD-10-CM

## 2021-10-14 DIAGNOSIS — M25.572 PAIN IN JOINT INVOLVING ANKLE AND FOOT, LEFT: ICD-10-CM

## 2021-10-14 PROCEDURE — 97110 THERAPEUTIC EXERCISES: CPT | Mod: GP

## 2021-10-14 PROCEDURE — 97161 PT EVAL LOW COMPLEX 20 MIN: CPT | Mod: GP

## 2021-10-14 NOTE — PROGRESS NOTES
Physical Therapy Initial Evaluation  10/14/2021     Precautions/Restrictions/MD instructions: 21 Continue protected weightbearing in the boot with crutches and phase off the crutches/increase weight to tolerance.    Therapist Assessment: Muriel Porras is a 61 year old female patient presenting to Physical Therapy with left foot/ankle pain s/p midfoot fusion, PT tendon repair. Patient demonstrates limited ankle ROM, decreased strength and edema. These impairments limit their ability to ambulate, stand, negotiate stairs. Patient usually uses knee scooter for mobility at home and ambulates with boot, crutches and 30-40% weightbearing. Skilled PT services are necessary in order to reduce impairments and improve independent function.    Subjective:   Chief Complaint: Left ankle/foot pain and decreased function.  Associated symptoms: numbness/tingling, swelling  Onset date: Surgery L gastroc recession, post tib tendon debridement and repair and talonavicular fusion on 21  Pain severity: 2/10 at rest; 2/10 current, 4/10 worst  Location of pain: numbness and tingling in lateral edge of foot, occasional discomfort in plantar surface of foot.   Quality of Pain: ache, soreness   Better: elevation, ice, ibuprofen  Worse:  Weightbearing, stairs, walking, standing  Time of day: evening  Progression of Symptoms since onset:  Since onset, these symptoms are improving  Previous treatment: has included ice, rest; effect was mild  Current Functional Status: ambulates with crutches and uses knee scooter for mobility at home. Working from home.   Previous Functional Status:  fully functional prior to pain onset/injury.       General health as reported by patient: good.    PMH: thyroid problems   Surgical history/trauma: Orthopedic  Imagin21: intact hardware without evidence of loosening or fracture.  The fusion site is still partially visible dorsally, but there does appear to be bone bridging  plantarly.  Medications: Oxycodone    Occupation: Bank Teller Job duties: computer work, prolonged standing  Current exercise regimen/Recreational activities: no formal exercise program, goes on walks with mother.     Red Flags: (Bold when present) - reviewed the following and denies  Calf pain/swelling/warmth      Patient's Goal(s): Improve ability to do stairs, return to work duties            OBJECTIVE    Gait: Walking boot, bilateral crutches, partial WB through L foot.     Edema:  Figure 8: L: 51 cm ; R: 48 cm    ROM/Strength Blank if not assessed   ROM L ROM R MMT L MMT R   Dorsiflexion 0 OP 7 5 5   Knee bent       Knee straight       Plantarflexion 45 45 4 5   Inversion 8/18 OP 25 3 5   Eversion 3/10 OP 5 3 5   G Toe Ext   5 5   G Toe Flex       Diagonals        Strength ROM Pain  Tenderness   Dorsal Medial       Dorsal Lateral       Caudal Medial       Caudal Lateral           Special Tests: Blank if not assessed   L R   Anterior Drawer     Posterior Drawer     Talar Tilt medial - -   Talar Tilt lateral - -   External Rotation     Keith's (Achilles)     Windlass     James's         Scar: No signs of infection. Limited mobility distally    Palpation: Tenderness to palpation lateral border of the foot      ASSESSMENT/PLAN  Patient is a 61 year old female with left side ankle complaints.    Patient has the following significant findings with corresponding treatment plan.                Diagnosis 1: Left ankle/foot pain s/p midfoot fusion, PT tendon repair.   Pain -  hot/cold therapy, manual therapy, splint/taping/bracing/orthotics, self management and education  Decreased ROM/flexibility - manual therapy, therapeutic exercise and home program  Decreased joint mobility - manual therapy, therapeutic exercise, therapeutic activity and home program  Decreased strength - therapeutic exercise, therapeutic activities and home program  Impaired balance - neuro re-education, gait training, therapeutic activities,  adaptive equipment/assistive device and home program  Decreased proprioception - neuro re-education, gait training, therapeutic activities and home program  Edema - vasopneumatics, cold therapy and self management/home program  Impaired gait - gait training, assistive devices and home program    Therapy Evaluation Codes:   1) History comprised of:   Personal factors that impact the plan of care:      None.    Comorbidity factors that impact the plan of care are:      None.     Medications impacting care: Anti-inflammatory and Pain.  2) Examination of Body Systems comprised of:   Body structures and functions that impact the plan of care:      Ankle.   Activity limitations that impact the plan of care are:      Jumping, Lifting, Running, Sitting, Sports, Squatting/kneeling, Stairs, Standing, Walking and Working.  3) Clinical presentation characteristics are:   Stable/Uncomplicated.  4) Decision-Making    Low complexity using standardized patient assessment instrument and/or measureable assessment of functional outcome.  Cumulative Therapy Evaluation is: Low complexity.    Previous and current functional limitations:  (See Goal Flow Sheet for this information)    Short term and Long term goals: (See Goal Flow Sheet for this information)     Communication ability:  Patient appears to be able to clearly communicate and understand verbal and written communication and follow directions correctly.  Treatment Explanation - The following has been discussed with the patient:   RX ordered/plan of care  Anticipated outcomes  Possible risks and side effects  This patient would benefit from PT intervention to resume normal activities.   Rehab potential is good.    Frequency:  1 X week, once daily  Duration:  for 6 weeks per MD order  Discharge Plan:  Achieve all LTG.  Independent in home treatment program.  Reach maximal therapeutic benefit.    Please refer to the daily flowsheet for treatment today, total treatment time and time  spent performing 1:1 timed codes.

## 2021-10-14 NOTE — TELEPHONE ENCOUNTER
Called patient and left a voicemail that order was placed. [facility] can view the orders online.    Leighton Cornejo MA

## 2021-10-14 NOTE — TELEPHONE ENCOUNTER
Order printed, will notify patient today.    Marko Maya DPM [facility]  Podiatric Foot & Ankle Surgeon  [facility]  [phone]

## 2021-10-17 ENCOUNTER — MEDICAL CORRESPONDENCE (OUTPATIENT)
Dept: HEALTH INFORMATION MANAGEMENT | Facility: CLINIC | Age: 61
End: 2021-10-17
Payer: COMMERCIAL

## 2021-10-28 ENCOUNTER — THERAPY VISIT (OUTPATIENT)
Dept: PHYSICAL THERAPY | Facility: CLINIC | Age: 61
End: 2021-10-28
Payer: COMMERCIAL

## 2021-10-28 DIAGNOSIS — M25.572 PAIN IN JOINT INVOLVING ANKLE AND FOOT, LEFT: ICD-10-CM

## 2021-10-28 PROCEDURE — 97110 THERAPEUTIC EXERCISES: CPT | Mod: GP

## 2021-10-28 PROCEDURE — 97016 VASOPNEUMATIC DEVICE THERAPY: CPT | Mod: GP

## 2021-11-01 ENCOUNTER — ANCILLARY PROCEDURE (OUTPATIENT)
Dept: GENERAL RADIOLOGY | Facility: CLINIC | Age: 61
End: 2021-11-01
Attending: PODIATRIST
Payer: COMMERCIAL

## 2021-11-01 ENCOUNTER — OFFICE VISIT (OUTPATIENT)
Dept: PODIATRY | Facility: CLINIC | Age: 61
End: 2021-11-01
Payer: COMMERCIAL

## 2021-11-01 VITALS — DIASTOLIC BLOOD PRESSURE: 72 MMHG | SYSTOLIC BLOOD PRESSURE: 122 MMHG

## 2021-11-01 DIAGNOSIS — M21.42 ACQUIRED FLAT FOOT, LEFT: ICD-10-CM

## 2021-11-01 DIAGNOSIS — Z98.890 S/P FOOT SURGERY, LEFT: Primary | ICD-10-CM

## 2021-11-01 DIAGNOSIS — M76.822 POSTERIOR TIBIAL TENDINITIS OF LEFT LOWER EXTREMITY: ICD-10-CM

## 2021-11-01 DIAGNOSIS — Z98.890 S/P FOOT SURGERY, LEFT: ICD-10-CM

## 2021-11-01 PROCEDURE — 73630 X-RAY EXAM OF FOOT: CPT | Mod: LT | Performed by: RADIOLOGY

## 2021-11-01 PROCEDURE — 99213 OFFICE O/P EST LOW 20 MIN: CPT | Performed by: PODIATRIST

## 2021-11-01 NOTE — PROGRESS NOTES
Foot & Ankle Surgery  November 1, 2021    S:  Patient in today sp PROCEDURES:    1.  Left gastrocnemius recession.  2.  Left posterior tibial tendon debridement and repair.  3.  Left talonavicular fusion. on 7/14/2021.  Pain levels improving.  She states that she has been getting sore with physical therapy but she notices that her foot/ankle continue to improve.  She is now full weightbearing in the boot without the crutches and with minimal discomfort, although the foot can get tired towards the end of the day.    /72   LMP 09/01/2014       ROS - positive for CC.  Patient denies current nausea, vomiting, chills, fevers, belly pain, calf pain, chest pain or SOB.  Complete remainder of ROS is otherwise neg.    PE -incision well-healed.  I am unable to elicit any pain at the talonavicular fusion site.  No change in alignment of the foot..  Skin shows no trophic, color or temperature changes otherwise.  No calf redness, swelling or pain noted otherwise.    Imaging -x-rays left foot 11/1/2021 - IMPRESSION: Talonavicular arthrodesis with plate and screws in place,  the alignment and appearance unchanged from 9/30/2021.    A/P - 61 year old yo patient approx 15 weeks sp above procedure  -I personally reviewed and interpreted today's x-ray results.  The talonavicular fusion site seems to be gradually disappearing, indicative of healing  -Transition from the boot to a comfortable shoe to tolerance with care to avoid exceeding the pain threshold at the surgical sites with either the amount of weight or time on the foot as this can have a negative impact on healing.  -OTC insert handout for arch support.  The orthotic lab contact information was dispensed, she will call or MyChart for orthotic lab referral if she would like  -She is given a note to return to seated duties at work 11/8/21 x 4 weeks    Follow up  -2 months or sooner with acute issues    Plan for [id number] -currently working      Marko CANALES  GAL Maya [facility] FACFAOM  Podiatric Foot & Ankle Surgeon  [facility]  [phone]    Disclaimer: This note consists of symbols derived from keyboarding, dictation and/or voice recognition software. As a result, there may be errors in the script that have gone undetected. Please consider this when interpreting information found in this chart.

## 2021-11-01 NOTE — PATIENT INSTRUCTIONS
Thank you for choosing [facility] / [facility]le [facility]!    DR [place] CLIN[facility]:  [facility]   [address]   [place] [address] [phone]      SET UP SURGERY: [phone]   RADIOLOGY: [phone]   APPOINTMENTS: [phone]   BILLING QUESTIONS: [phone]   FAX NUMBER: [phone]       OVER THE COUNTER INSERTS    Most of these can be found at your local [employer], [employer], or online:    [employer]   Sofsole Fit Mom Trusted   Power Step   Walk-Fit ([employer])  *For heel pain* Arch Cradles  *For heel pain*       ** A good high quality over the counter insert should cost around $40-$50    ** Capsulitis/Metarasalgia - try adding a metatarsal pad on your inserts or find an insert with one built in        [facility]  [facility]  [address]  [place] [address]  Phone: [phone]  Fax: [phone] [facility]  [address]  [place] [address]  Phone: [phone]   Fax: [phone]   [facility]  [address]  [place] [address]  Phone: [phone]  Fax: [phone] [facility]  [address]  [place] [address]  Phone: [phone]   Fax: [phone]   [facility]   [address]  [place] [address]  Phone: [phone]  Fax: [phone] * Please call any location listed to make an appointment for a casting/fitting. Your referral was sent to their central office and they will all have the order on file.

## 2021-11-01 NOTE — LETTER
[facility]  [address]  [address]  [facility] [facility] [address]  [phone]          November 1, 2021    RE:  Muriel Porras                                                                                                                                                       [address] [place] [address]            To whom it may concern:    Muriel Porras is under my professional care after undergoing left foot reconstructive surgery.  She is cleared to return to work starting 11/8/21 with the following restrictions:    1.  Seated duties only;   2.  Ice, elevate, rest the foot every 3 hours x 10 minutes as needed based on pain.    These restrictions lapse after 4 weeks unless otherwise noted.  She will be seen in clinic in 2 months for her next recheck.      Sincerely,        Marko Maya DPM FACFAS FACFAOM  Podiatric Foot & Ankle Surgeon  [facility]

## 2021-11-08 ENCOUNTER — THERAPY VISIT (OUTPATIENT)
Dept: PHYSICAL THERAPY | Facility: CLINIC | Age: 61
End: 2021-11-08
Payer: COMMERCIAL

## 2021-11-08 DIAGNOSIS — M25.572 PAIN IN JOINT INVOLVING ANKLE AND FOOT, LEFT: ICD-10-CM

## 2021-11-08 PROCEDURE — 97110 THERAPEUTIC EXERCISES: CPT | Mod: GP

## 2021-11-08 PROCEDURE — 97140 MANUAL THERAPY 1/> REGIONS: CPT | Mod: GP

## 2021-11-18 ENCOUNTER — THERAPY VISIT (OUTPATIENT)
Dept: PHYSICAL THERAPY | Facility: CLINIC | Age: 61
End: 2021-11-18
Attending: PHYSICIAN ASSISTANT
Payer: COMMERCIAL

## 2021-11-18 ENCOUNTER — VIRTUAL VISIT (OUTPATIENT)
Dept: FAMILY MEDICINE | Facility: CLINIC | Age: 61
End: 2021-11-18
Payer: COMMERCIAL

## 2021-11-18 DIAGNOSIS — H81.12 BENIGN PAROXYSMAL POSITIONAL VERTIGO, LEFT: ICD-10-CM

## 2021-11-18 DIAGNOSIS — H81.10 BENIGN PAROXYSMAL POSITIONAL VERTIGO, UNSPECIFIED LATERALITY: Primary | ICD-10-CM

## 2021-11-18 DIAGNOSIS — H81.10 BENIGN PAROXYSMAL POSITIONAL VERTIGO, UNSPECIFIED LATERALITY: ICD-10-CM

## 2021-11-18 PROCEDURE — 95992 CANALITH REPOSITIONING PROC: CPT | Mod: GP | Performed by: PHYSICAL THERAPIST

## 2021-11-18 PROCEDURE — 97161 PT EVAL LOW COMPLEX 20 MIN: CPT | Mod: GP | Performed by: PHYSICAL THERAPIST

## 2021-11-18 PROCEDURE — 99213 OFFICE O/P EST LOW 20 MIN: CPT | Mod: 95 | Performed by: PHYSICIAN ASSISTANT

## 2021-11-18 RX ORDER — MECLIZINE HYDROCHLORIDE 25 MG/1
25 TABLET ORAL 3 TIMES DAILY PRN
Qty: 30 TABLET | Refills: 3 | Status: SHIPPED | OUTPATIENT
Start: 2021-11-18 | End: 2022-05-16

## 2021-11-18 NOTE — PROGRESS NOTES
Physical Therapy Initial Evaluation  Subjective:  The history is provided by the patient. No  was used.   Patient Health History  Muriel Porras being seen for Vertigo.     Problem began: 11/18/2021 (date of order).   Problem occurred: Unknown   Pain is reported as 4/10 on pain scale.  General health as reported by patient is good.  Pertinent medical history includes: concussions/dizziness, migraines/headaches, numbness/tingling, overweight and thyroid problems.   Red flags:  Severe dizziness.  Medical allergies: none.   Surgeries include:  Orthopedic surgery. Other surgery history details: flat foot reconstruction.    Current medications:  Thyroid medication.    Current occupation is .   Primary job tasks include:  Computer work, prolonged sitting and prolonged standing.                                    Objective:  System    Physical Exam    General     ROS  S:  Muriel is a 61 year old patient complaining of vertigo-like symptoms.  Onset of symptoms: Patient's first episode started in September 2020 and was originally prescribed Meclizine, which helped with symptoms. She was not treated further than this for that bout of vertigo. Patient experienced a terrible episode on 11/2/2021 where she almost fell but caught herself on the counter. Patient reports she felt dizzy and nauseous and was sweating profusely. Has felt like things are just not right since then. Patient has noticed similar symptoms when starting to drive when a red light turns green, when reaching down to cupboards, going between two computer screens, and notices it when she moves from side to side in bed. Patient reports experiencing minimal tinnitus and states she had a head cold about 1.5 weeks ago right after episode on the 2nd. Patient reports walking through parking lot a few weeks ago where she moved her head quickly and fell.        Is this a recurrent problem: Yes  Progression since onset: Unchanged  Frequency  of episodes/time of day: Feels dizzy often throughout the day; had main episode on 11/2/2021.  Duration of episodes: Depends on the exacerbation of symptoms.  Exacerbating factors: Looking upward, moving eyes or field of vision from side to side, acceleration in the car, rolling over in bed.   Relieving factors: Avoiding positions that cause symptoms.   Previous treatments:  None  Significant medical hx: Patient is currently being seen post-ankle surgery in physical therapy. Patient had a head cold about 1.5 weeks ago and has felt some pressure in the ears and sinuses due to cold.   Meds: Meclizine      O:  Cervical ROM screen: All cervical ROM is limited 75% in all directions. No pain or symptoms associated with cervical ROM.  Oculomotor/gaze stability screen:   Smooth pursuit test: negative   Saccades: positive for causing dizziness and blurry feeling   VOR: positive for causing dizziness  Vertebral artery test: Negative  Hallpike-Hewett maneuver:     R: negative for nystagmus; patient reported slushy feeling and slight dizziness   L: negative for nystagmus; patient reported slushy feeling and slight dizziness  Horizontal canal test:    R: very minimal nystagmus noted for less than 2 seconds; patient reported slushy feeling and slight dizziness    L: nystagmus noted with slow beating for about 5 beats; patient reported increased slushy feeling and dizziness  Balance testing:   Eyes open, feet together: 20/20 seconds   Eyes open, semi-tandem: 20/20 seconds   Eyes open, tandem: 14/20 seconds with minimal sway   Eyes closed, feet together: 20/20 seconds with minimal sway       Assessment/Plan:    Patient is a 61 year old female with vertigo complaints.    Patient has the following significant findings with corresponding treatment plan.                Diagnosis 1:  BPPV  Dizziness - CRMs    Cumulative Therapy Evaluation is: Low complexity.    Previous and current functional limitations:  (See Goal Flow Sheet for this  information)    Short term and Long term goals: (See Goal Flow Sheet for this information)     Communication ability:  Patient appears to be able to clearly communicate and understand verbal and written communication and follow directions correctly.  Treatment Explanation - The following has been discussed with the patient:   RX ordered/plan of care  Anticipated outcomes  Possible risks and side effects  This patient would benefit from PT intervention to resume normal activities.   Rehab potential is good.    Frequency:  1 X week, once daily  Duration:  for 3 weeks  Discharge Plan:  Achieve all LTG.  Independent in home treatment program.  Reach maximal therapeutic benefit.    Please refer to the daily flowsheet for treatment today, total treatment time and time spent performing 1:1 timed codes.

## 2021-11-18 NOTE — PROGRESS NOTES
"Muriel is a 61 year old who is being evaluated via a billable video visit.      How would you like to obtain your AVS? MyChart  If the video visit is dropped, the invitation should be resent by: Text to cell phone: [phone]  Will anyone else be joining your video visit? No      Video Start Time: 7:37am    Assessment & Plan     1. Benign paroxysmal positional vertigo, unspecified laterality        Meclizine prescription renewed. Referral to physical therapy for further eval/tx.       Prescription drug management         BMI:   Estimated body mass index is 30.61 kg/m  as calculated from the following:    Height as of 9/30/21: 1.549 m (5' 1\").    Weight as of 9/30/21: 73.5 kg (162 lb).     Return for follow up with PT.    LAY Smith [facility] ERNESTO Llamas is a 61 year old who presents for the following health issues     History of Present Illness       She eats 2-3 servings of fruits and vegetables daily.She consumes 0 sweetened beverage(s) daily.She exercises with enough effort to increase her heart rate 9 or less minutes per day.  She exercises with enough effort to increase her heart rate 3 or less days per week.   She is taking medications regularly.       Dizziness  Onset/Duration: 2-3 weeks   Description:   Do you feel faint: YES  Does it feel like the surroundings (bed, room) are moving: YES  Unsteady/off balance: YES  Have you passed out or fallen: no  Intensity: moderate  Progression of Symptoms: intermittent  Accompanying Signs & Symptoms:  Heart palpitations or chest pain: no  Nausea, vomiting: YES- nausea   Weakness or lack of coordination in arms or legs: no  Vision or speech changes: no  Numbness or tingling: no  Ringing in ears (Tinnitus): no  Hearing Loss: no  History:   Head trauma/concussion history: no  Previous similar symptoms: YES  Recent bleeding history: no  Any new medications (BP?): no  Precipitating factors:   Worse with activity: YES  Worse " with head movement: YES  Alleviating factors:   Does staying in a fixed position give relief: YES    Got really hot, sweaty - resolved after ~45 mins  Worse with head and eye movements  Rolling over in bed triggers it      Review of Systems   Constitutional, neuro systems are negative, except as otherwise noted.      Objective           Vitals:  No vitals were obtained today due to virtual visit.    Physical Exam   GENERAL: Healthy, alert and no distress  EYES: Eyes grossly normal to inspection.  No discharge or erythema, or obvious scleral/conjunctival abnormalities.  RESP: No audible wheeze, cough, or visible cyanosis.  No visible retractions or increased work of breathing.    SKIN: Visible skin clear. No significant rash, abnormal pigmentation or lesions.  NEURO: Cranial nerves grossly intact.  Mentation and speech appropriate for age.  PSYCH: Mentation appears normal, affect normal/bright, judgement and insight intact, normal speech and appearance well-groomed.          Video-Visit Details    Type of service:  Video Visit    Video End Time: 7:46am, 9 mins    Originating Location (pt. Location): Home    Distant Location (provider location):  [facility] ERNESTO     Platform used for Video Visit: John

## 2021-11-19 ENCOUNTER — TELEPHONE (OUTPATIENT)
Dept: PODIATRY | Facility: CLINIC | Age: 61
End: 2021-11-19

## 2021-11-19 ENCOUNTER — THERAPY VISIT (OUTPATIENT)
Dept: PHYSICAL THERAPY | Facility: CLINIC | Age: 61
End: 2021-11-19
Payer: COMMERCIAL

## 2021-11-19 DIAGNOSIS — Z98.890 S/P FOOT SURGERY, LEFT: Primary | ICD-10-CM

## 2021-11-19 DIAGNOSIS — M25.572 PAIN IN JOINT INVOLVING ANKLE AND FOOT, LEFT: ICD-10-CM

## 2021-11-19 DIAGNOSIS — M21.42 ACQUIRED FLAT FOOT, LEFT: ICD-10-CM

## 2021-11-19 DIAGNOSIS — M76.822 POSTERIOR TIBIAL TENDINITIS OF LEFT LOWER EXTREMITY: ICD-10-CM

## 2021-11-19 PROCEDURE — 97140 MANUAL THERAPY 1/> REGIONS: CPT | Mod: GP

## 2021-11-19 PROCEDURE — 97112 NEUROMUSCULAR REEDUCATION: CPT | Mod: GP

## 2021-11-19 PROCEDURE — 97110 THERAPEUTIC EXERCISES: CPT | Mod: GP

## 2021-11-19 NOTE — TELEPHONE ENCOUNTER
----- Message from Reyna Storey PT sent at 11/19/2021  4:02 PM CST -----  Regarding: Requesting additional PT visits.  Good afternoon,    I am working with Ms. Porras on rehab after her foot surgery on 7/14/21. She would benefit from another order of PT (she has been seen for 4 of 6 approved visits) to work on ROM and strength to improve gait and stairs.     Please let me know if you have any additional questions.    Thank you,  Reyna Storey, PT

## 2021-11-19 NOTE — TELEPHONE ENCOUNTER
Order signed for 6 further PT sessions.    Marko Maya DPM [facility]  Podiatric Foot & Ankle Surgeon  [facility]  [phone]

## 2021-11-26 ENCOUNTER — THERAPY VISIT (OUTPATIENT)
Dept: PHYSICAL THERAPY | Facility: CLINIC | Age: 61
End: 2021-11-26
Payer: COMMERCIAL

## 2021-11-26 DIAGNOSIS — M25.572 PAIN IN JOINT INVOLVING ANKLE AND FOOT, LEFT: ICD-10-CM

## 2021-11-26 PROCEDURE — 97140 MANUAL THERAPY 1/> REGIONS: CPT | Mod: GP

## 2021-11-26 PROCEDURE — 97110 THERAPEUTIC EXERCISES: CPT | Mod: GP

## 2021-11-26 PROCEDURE — 97112 NEUROMUSCULAR REEDUCATION: CPT | Mod: GP

## 2021-12-03 ENCOUNTER — THERAPY VISIT (OUTPATIENT)
Dept: PHYSICAL THERAPY | Facility: CLINIC | Age: 61
End: 2021-12-03
Payer: COMMERCIAL

## 2021-12-03 DIAGNOSIS — M25.572 PAIN IN JOINT INVOLVING ANKLE AND FOOT, LEFT: ICD-10-CM

## 2021-12-03 PROCEDURE — 97140 MANUAL THERAPY 1/> REGIONS: CPT | Mod: GP

## 2021-12-03 PROCEDURE — 97110 THERAPEUTIC EXERCISES: CPT | Mod: GP

## 2021-12-03 PROCEDURE — 97016 VASOPNEUMATIC DEVICE THERAPY: CPT | Mod: GP

## 2021-12-10 ENCOUNTER — THERAPY VISIT (OUTPATIENT)
Dept: PHYSICAL THERAPY | Facility: CLINIC | Age: 61
End: 2021-12-10
Payer: COMMERCIAL

## 2021-12-10 DIAGNOSIS — M25.572 PAIN IN JOINT INVOLVING ANKLE AND FOOT, LEFT: ICD-10-CM

## 2021-12-10 PROCEDURE — 97140 MANUAL THERAPY 1/> REGIONS: CPT | Mod: GP

## 2021-12-10 PROCEDURE — 97016 VASOPNEUMATIC DEVICE THERAPY: CPT | Mod: GP

## 2021-12-10 PROCEDURE — 97110 THERAPEUTIC EXERCISES: CPT | Mod: GP

## 2021-12-17 ENCOUNTER — THERAPY VISIT (OUTPATIENT)
Dept: PHYSICAL THERAPY | Facility: CLINIC | Age: 61
End: 2021-12-17
Payer: COMMERCIAL

## 2021-12-17 DIAGNOSIS — M25.572 PAIN IN JOINT INVOLVING ANKLE AND FOOT, LEFT: ICD-10-CM

## 2021-12-17 PROCEDURE — 97140 MANUAL THERAPY 1/> REGIONS: CPT | Mod: GP

## 2021-12-17 PROCEDURE — 97110 THERAPEUTIC EXERCISES: CPT | Mod: GP

## 2021-12-17 PROCEDURE — 97016 VASOPNEUMATIC DEVICE THERAPY: CPT | Mod: GP

## 2021-12-22 ENCOUNTER — THERAPY VISIT (OUTPATIENT)
Dept: PHYSICAL THERAPY | Facility: CLINIC | Age: 61
End: 2021-12-22
Payer: COMMERCIAL

## 2021-12-22 DIAGNOSIS — M25.572 PAIN IN JOINT INVOLVING ANKLE AND FOOT, LEFT: ICD-10-CM

## 2021-12-22 PROCEDURE — 97110 THERAPEUTIC EXERCISES: CPT | Mod: GP

## 2021-12-22 PROCEDURE — 97140 MANUAL THERAPY 1/> REGIONS: CPT | Mod: GP

## 2021-12-22 PROCEDURE — 97112 NEUROMUSCULAR REEDUCATION: CPT | Mod: GP

## 2021-12-29 ENCOUNTER — THERAPY VISIT (OUTPATIENT)
Dept: PHYSICAL THERAPY | Facility: CLINIC | Age: 61
End: 2021-12-29
Payer: COMMERCIAL

## 2021-12-29 DIAGNOSIS — M25.572 PAIN IN JOINT INVOLVING ANKLE AND FOOT, LEFT: ICD-10-CM

## 2021-12-29 PROCEDURE — 97110 THERAPEUTIC EXERCISES: CPT | Mod: GP

## 2021-12-29 PROCEDURE — 97112 NEUROMUSCULAR REEDUCATION: CPT | Mod: GP

## 2021-12-29 PROCEDURE — 97140 MANUAL THERAPY 1/> REGIONS: CPT | Mod: GP

## 2021-12-29 NOTE — PROGRESS NOTES
PROGRESS  REPORT    Progress reporting period is from 10/14/21 to 12/29/21.       SUBJECTIVE  Subjective changes noted by patient: Feels swelling is getting better and redness is mostly going away. Trying to do short walks throughout the day which feels good. Still unable to descend stairs in normal reciprocal pattern.  Previous pain level was 2/10.   Changes in function:  Yes (See Goal flowsheet attached for changes in current functional level)  Adverse reaction to treatment or activity: None    OBJECTIVE  Changes noted in objective findings:  Yes  Objective: Unable to perform squat due to limited ankle DF ROM. Decreased swelling from last visit. Antalgic gait due to decreased ankle DF and decreased push-off.      ASSESSMENT/PLAN  Updated problem list and treatment plan: Diagnosis 1:  Left foot reconstruction   Pain -  hot/cold therapy, manual therapy, splint/taping/bracing/orthotics, self management, education and home program  Decreased ROM/flexibility - manual therapy, therapeutic exercise and home program  Decreased joint mobility - manual therapy, therapeutic exercise and home program  Decreased strength - therapeutic exercise, therapeutic activities and home program  Impaired balance - neuro re-education, gait training, therapeutic activities and home program  Edema - vasopneumatics, cold therapy and self management/home program  Impaired gait - gait training and home program  STG/LTGs have been met or progress has been made towards goals:  Yes (See Goal flow sheet completed today.)  Assessment of Progress: The patient has had set backs in their progress.  Patient is meeting short term goals and is progressing towards long term goals.  Self Management Plans:  Patient has been instructed in a home treatment program.  Patient  has been instructed in self management of symptoms.  I have re-evaluated this patient and find that the nature, scope, duration and intensity of the therapy is appropriate for the medical  condition of the patient.  Muriel continues to require the following intervention to meet STG and LTG's:  PT    Recommendations:  This patient would benefit from continued therapy.     Frequency:  1 X week, once daily  Duration:  for 6 weeks        Please refer to the daily flowsheet for treatment today, total treatment time and time spent performing 1:1 timed codes.

## 2022-01-07 ENCOUNTER — THERAPY VISIT (OUTPATIENT)
Dept: PHYSICAL THERAPY | Facility: CLINIC | Age: 62
End: 2022-01-07
Payer: COMMERCIAL

## 2022-01-07 DIAGNOSIS — M25.572 PAIN IN JOINT INVOLVING ANKLE AND FOOT, LEFT: ICD-10-CM

## 2022-01-07 PROCEDURE — 97112 NEUROMUSCULAR REEDUCATION: CPT | Mod: GP

## 2022-01-07 PROCEDURE — 97140 MANUAL THERAPY 1/> REGIONS: CPT | Mod: GP

## 2022-01-07 PROCEDURE — 97110 THERAPEUTIC EXERCISES: CPT | Mod: GP

## 2022-01-10 ENCOUNTER — TELEPHONE (OUTPATIENT)
Dept: PODIATRY | Facility: CLINIC | Age: 62
End: 2022-01-10
Payer: COMMERCIAL

## 2022-01-10 DIAGNOSIS — M76.822 POSTERIOR TIBIAL TENDINITIS OF LEFT LOWER EXTREMITY: ICD-10-CM

## 2022-01-10 DIAGNOSIS — Z98.890 S/P FOOT SURGERY, LEFT: Primary | ICD-10-CM

## 2022-01-10 DIAGNOSIS — M21.42 ACQUIRED FLAT FOOT, LEFT: ICD-10-CM

## 2022-01-10 NOTE — TELEPHONE ENCOUNTER
----- Message from Reyna Storey, PT sent at 1/7/2022  5:16 PM CST -----  Regarding: Requesting additional PT visits  Good afternoon,     I am working with Ms. Porras on rehab after her foot surgery on 7/14/21. She would benefit from another order of PT (she has been seen for 11 of 12 approved visits) to work on ROM and strength to improve her function.    Please let me know if you have any additional questions.     Thank you,   Reyna Storey, PT

## 2022-01-10 NOTE — TELEPHONE ENCOUNTER
6 further PT sessions ordered.    Marko Maya, CARMENCITAM FACFAS FACFAOM  Podiatric Foot & Ankle Surgeon  [facility]  [phone]

## 2022-01-11 ENCOUNTER — ANCILLARY PROCEDURE (OUTPATIENT)
Dept: GENERAL RADIOLOGY | Facility: CLINIC | Age: 62
End: 2022-01-11
Attending: PODIATRIST
Payer: COMMERCIAL

## 2022-01-11 ENCOUNTER — OFFICE VISIT (OUTPATIENT)
Dept: PODIATRY | Facility: CLINIC | Age: 62
End: 2022-01-11
Payer: COMMERCIAL

## 2022-01-11 VITALS
DIASTOLIC BLOOD PRESSURE: 73 MMHG | WEIGHT: 178 LBS | BODY MASS INDEX: 32.76 KG/M2 | SYSTOLIC BLOOD PRESSURE: 124 MMHG | HEIGHT: 62 IN | HEART RATE: 64 BPM

## 2022-01-11 DIAGNOSIS — M21.42 ACQUIRED FLAT FOOT, LEFT: ICD-10-CM

## 2022-01-11 DIAGNOSIS — Z98.890 S/P FOOT SURGERY, LEFT: Primary | ICD-10-CM

## 2022-01-11 DIAGNOSIS — Z98.890 S/P FOOT SURGERY, LEFT: ICD-10-CM

## 2022-01-11 PROCEDURE — 99213 OFFICE O/P EST LOW 20 MIN: CPT | Performed by: PODIATRIST

## 2022-01-11 PROCEDURE — 73630 X-RAY EXAM OF FOOT: CPT | Mod: LT | Performed by: RADIOLOGY

## 2022-01-11 ASSESSMENT — MIFFLIN-ST. JEOR: SCORE: 1320.65

## 2022-01-11 NOTE — PROGRESS NOTES
"Foot & Ankle Surgery  January 11, 2022    S:  Patient in today sp left gastrocnemius recession, PT tendon debridement and repair, and talonavicular fusion on 7/14/2021.  Pain levels improving. I ordered six further PT sessions yesterday at the therapist request. Muriel notes that her foot and ankle do feel better after PT. She is continue to make progress. However, she does notice that with increased time on her foot, the ankle does bother her and it feels \"tight\". This is certainly better with shoes. She does have a compression stocking on today    /73   Pulse 64   Ht 1.575 m (5' 2\")   Wt 80.7 kg (178 lb)   LMP 09/01/2014   BMI 32.56 kg/m        ROS - positive for CC.  Patient denies current nausea, vomiting, chills, fevers, belly pain, calf pain, chest pain or SOB.  Complete remainder of ROS is otherwise neg.    PE -swelling levels low today. No pain or instability or crepitus with stressing of the talonavicular fusion site. Minimal pain is appreciated along the course the PT tendon..  Skin shows no trophic, color or temperature changes otherwise.  No calf redness, swelling or pain noted otherwise.    Imaging - IMPRESSION: Talonavicular arthrodesis with plate and screw fixation.  No visualization of the joint space. Mild degenerative changes in the  first MTP joint. Osteopenia. Plantar calcaneal spur.    A/P - 61 year old yo patient approx 6 months sp above procedure  -I personally reviewed and interpreted the x-ray results. There does appear to be some bridging of bone. There is no hardware pathology. This appears to be progressing well.  -She has back to shoes and back to activities. She again states that it can get sore with increased time on her feet. This does seem to correlate with swelling. As such, I advised a stronger compression stocking, elevation of the foot at/above hip level 30 minutes 3 times daily, and utilizing ice versus heat for swelling and pain control. If this improves swelling and " symptoms, no further intervention is needed. If she continues to have pain and discomfort despite swelling control, I advise she call or send a [employer] message and I will order a CT scan. We discussed two potential causes for continued pain at this point out from surgery. This would either be the fusion site not healing properly (no evidence of this on today's x-rays) versus hardware pain.    Follow up  -based on above plan or sooner with acute issues    Plan for [id number] -currently working      Mrako Maya DPM FACFAS FACFAOM  Podiatric Foot & Ankle Surgeon  [facility]  [phone]    Disclaimer: This note consists of symbols derived from keyboarding, dictation and/or voice recognition software. As a result, there may be errors in the script that have gone undetected. Please consider this when interpreting information found in this chart.

## 2022-01-14 ENCOUNTER — THERAPY VISIT (OUTPATIENT)
Dept: PHYSICAL THERAPY | Facility: CLINIC | Age: 62
End: 2022-01-14
Payer: COMMERCIAL

## 2022-01-14 DIAGNOSIS — Z98.890 S/P FOOT SURGERY, LEFT: ICD-10-CM

## 2022-01-14 DIAGNOSIS — M21.42 ACQUIRED FLAT FOOT, LEFT: ICD-10-CM

## 2022-01-14 DIAGNOSIS — M25.572 PAIN IN JOINT INVOLVING ANKLE AND FOOT, LEFT: ICD-10-CM

## 2022-01-14 DIAGNOSIS — M76.822 POSTERIOR TIBIAL TENDINITIS OF LEFT LOWER EXTREMITY: ICD-10-CM

## 2022-01-14 PROCEDURE — 97110 THERAPEUTIC EXERCISES: CPT | Mod: GP

## 2022-01-14 PROCEDURE — 97140 MANUAL THERAPY 1/> REGIONS: CPT | Mod: GP

## 2022-02-09 ENCOUNTER — THERAPY VISIT (OUTPATIENT)
Dept: PHYSICAL THERAPY | Facility: CLINIC | Age: 62
End: 2022-02-09
Payer: COMMERCIAL

## 2022-02-09 DIAGNOSIS — M25.572 PAIN IN JOINT INVOLVING ANKLE AND FOOT, LEFT: ICD-10-CM

## 2022-02-09 PROCEDURE — 97140 MANUAL THERAPY 1/> REGIONS: CPT | Mod: GP

## 2022-02-09 PROCEDURE — 97110 THERAPEUTIC EXERCISES: CPT | Mod: GP

## 2022-02-18 ENCOUNTER — THERAPY VISIT (OUTPATIENT)
Dept: PHYSICAL THERAPY | Facility: CLINIC | Age: 62
End: 2022-02-18
Payer: COMMERCIAL

## 2022-02-18 DIAGNOSIS — M25.572 PAIN IN JOINT INVOLVING ANKLE AND FOOT, LEFT: ICD-10-CM

## 2022-02-18 PROCEDURE — 97110 THERAPEUTIC EXERCISES: CPT | Mod: GP

## 2022-02-18 PROCEDURE — 97112 NEUROMUSCULAR REEDUCATION: CPT | Mod: GP

## 2022-02-18 NOTE — PROGRESS NOTES
DISCHARGE REPORT    Progress reporting period is from 12/29/21 to 2/18/22.       SUBJECTIVE  Subjective changes noted by patient: Things have been going well. Yesterday was walking on some ice and slipped- did not fall but foot feels a little sore.  Overall very happy with the improvement   Changes in function:  Yes (See Goal flowsheet attached for changes in current functional level)  Adverse reaction to treatment or activity: None    OBJECTIVE  Changes noted in objective findings:   Objective: L ankle: PF 35 deg, DF 0 deg, Eversion 5 deg, Inversion 0 deg. SLS Left 5 sec, Right 12 sec, Tandem stance 30 sec B. Unable to perform single leg calf raise on L. Dynamic Gait Index: 22/24     ASSESSMENT/PLAN  Updated problem list and treatment plan: Diagnosis 1:  Left foot reconstruction  Pain -  home program  Decreased ROM/flexibility - home program  Decreased joint mobility - home program  Decreased strength - home program  Impaired balance - home program  STG/LTGs have been met or progress has been made towards goals:  Yes (See Goal flow sheet completed today.)  Assessment of Progress: Patient is meeting short term goals and is progressing towards long term goals.  Self Management Plans:  Patient has been instructed in a home treatment program.  Patient is independent in a home treatment program.  Patient  has been instructed in self management of symptoms.  Patient is independent in self management of symptoms.  I have re-evaluated this patient and find that the nature, scope, duration and intensity of the therapy is appropriate for the medical condition of the patient.  Muriel continues to require the following intervention to meet STG and LTG's:  PT intervention is no longer required to meet STG/LTG.    Recommendations:  This patient is ready to be discharged from therapy and continue their home treatment program.    Please refer to the daily flowsheet for treatment today, total treatment time and time spent performing  1:1 timed codes.

## 2022-03-24 NOTE — LETTER
[facility]  [address]  [address]  [facility] [address]  [phone]          September 30, 2021    RE:  Muriel Porras                                                                                                                                                       [address] [place] [address]            To whom it may concern:    Muriel Porras is under my professional care after undergoing reconstructive left foot/ankle surgery.  She will continue to work from home until 10/29/21.  I anticipate she will be cleared to return to work for seated/non weight bearing bearing duties only.  We will update her restrictions accordingly      Sincerely,        Marko Maya DPM FACFAS FACFAOM  Podiatric Foot & Ankle Surgeon  [facility]        
Age appropriate

## 2022-05-12 ASSESSMENT — ENCOUNTER SYMPTOMS
FREQUENCY: 0
MYALGIAS: 1
COUGH: 0
EYE PAIN: 0
NAUSEA: 0
CHILLS: 0
HEADACHES: 0
DIARRHEA: 0
CONSTIPATION: 0
HEMATOCHEZIA: 0
WEAKNESS: 0
PARESTHESIAS: 0
FEVER: 0
SHORTNESS OF BREATH: 0
HEMATURIA: 0
DYSURIA: 0
HEARTBURN: 0
SORE THROAT: 0
PALPITATIONS: 0
JOINT SWELLING: 1
ABDOMINAL PAIN: 0
NERVOUS/ANXIOUS: 0
DIZZINESS: 0
BREAST MASS: 0
ARTHRALGIAS: 1

## 2022-05-16 ENCOUNTER — HEALTH MAINTENANCE LETTER (OUTPATIENT)
Age: 62
End: 2022-05-16

## 2022-05-16 ENCOUNTER — OFFICE VISIT (OUTPATIENT)
Dept: FAMILY MEDICINE | Facility: CLINIC | Age: 62
End: 2022-05-16
Payer: COMMERCIAL

## 2022-05-16 VITALS
SYSTOLIC BLOOD PRESSURE: 128 MMHG | HEIGHT: 62 IN | OXYGEN SATURATION: 99 % | WEIGHT: 178.2 LBS | BODY MASS INDEX: 32.79 KG/M2 | TEMPERATURE: 96.9 F | HEART RATE: 63 BPM | RESPIRATION RATE: 18 BRPM | DIASTOLIC BLOOD PRESSURE: 66 MMHG

## 2022-05-16 DIAGNOSIS — R68.82 DECREASED LIBIDO: ICD-10-CM

## 2022-05-16 DIAGNOSIS — Z71.89 ADVANCE CARE PLANNING: ICD-10-CM

## 2022-05-16 DIAGNOSIS — N89.8 VAGINAL MASS: ICD-10-CM

## 2022-05-16 DIAGNOSIS — N94.10 DYSPAREUNIA IN FEMALE: ICD-10-CM

## 2022-05-16 DIAGNOSIS — R25.2 LEG CRAMPING: ICD-10-CM

## 2022-05-16 DIAGNOSIS — Z78.0 POSTMENOPAUSAL STATUS: ICD-10-CM

## 2022-05-16 DIAGNOSIS — K13.70 MOUTH PROBLEM: ICD-10-CM

## 2022-05-16 DIAGNOSIS — Z23 HIGH PRIORITY FOR 2019-NCOV VACCINE: ICD-10-CM

## 2022-05-16 DIAGNOSIS — Z00.00 ROUTINE GENERAL MEDICAL EXAMINATION AT A HEALTH CARE FACILITY: Primary | ICD-10-CM

## 2022-05-16 DIAGNOSIS — Z12.4 SCREENING FOR MALIGNANT NEOPLASM OF CERVIX: ICD-10-CM

## 2022-05-16 DIAGNOSIS — E03.4 HYPOTHYROIDISM DUE TO ACQUIRED ATROPHY OF THYROID: ICD-10-CM

## 2022-05-16 DIAGNOSIS — R73.01 ELEVATED FASTING GLUCOSE: ICD-10-CM

## 2022-05-16 DIAGNOSIS — Z12.31 ENCOUNTER FOR SCREENING MAMMOGRAM FOR BREAST CANCER: ICD-10-CM

## 2022-05-16 LAB
HBA1C MFR BLD: 5.1 % (ref 0–5.6)
MAGNESIUM SERPL-MCNC: 2.1 MG/DL (ref 1.6–2.3)
POTASSIUM BLD-SCNC: 4.1 MMOL/L (ref 3.4–5.3)
T4 FREE SERPL-MCNC: 0.6 NG/DL (ref 0.76–1.46)
TSH SERPL DL<=0.005 MIU/L-ACNC: 23.2 MU/L (ref 0.4–4)

## 2022-05-16 PROCEDURE — 84443 ASSAY THYROID STIM HORMONE: CPT | Performed by: PHYSICIAN ASSISTANT

## 2022-05-16 PROCEDURE — 0064A COVID-19,PF,MODERNA (18+ YRS BOOSTER .25ML): CPT | Performed by: PHYSICIAN ASSISTANT

## 2022-05-16 PROCEDURE — 83036 HEMOGLOBIN GLYCOSYLATED A1C: CPT | Performed by: PHYSICIAN ASSISTANT

## 2022-05-16 PROCEDURE — 91306 COVID-19,PF,MODERNA (18+ YRS BOOSTER .25ML): CPT | Performed by: PHYSICIAN ASSISTANT

## 2022-05-16 PROCEDURE — 84439 ASSAY OF FREE THYROXINE: CPT | Performed by: PHYSICIAN ASSISTANT

## 2022-05-16 PROCEDURE — 83735 ASSAY OF MAGNESIUM: CPT | Performed by: PHYSICIAN ASSISTANT

## 2022-05-16 PROCEDURE — 99396 PREV VISIT EST AGE 40-64: CPT | Mod: 25 | Performed by: PHYSICIAN ASSISTANT

## 2022-05-16 PROCEDURE — 84132 ASSAY OF SERUM POTASSIUM: CPT | Performed by: PHYSICIAN ASSISTANT

## 2022-05-16 PROCEDURE — 87624 HPV HI-RISK TYP POOLED RSLT: CPT | Performed by: PHYSICIAN ASSISTANT

## 2022-05-16 PROCEDURE — G0145 SCR C/V CYTO,THINLAYER,RESCR: HCPCS | Performed by: PHYSICIAN ASSISTANT

## 2022-05-16 PROCEDURE — 99214 OFFICE O/P EST MOD 30 MIN: CPT | Mod: 25 | Performed by: PHYSICIAN ASSISTANT

## 2022-05-16 PROCEDURE — 36415 COLL VENOUS BLD VENIPUNCTURE: CPT | Performed by: PHYSICIAN ASSISTANT

## 2022-05-16 ASSESSMENT — ENCOUNTER SYMPTOMS
WEAKNESS: 0
SHORTNESS OF BREATH: 0
CONSTIPATION: 0
PALPITATIONS: 0
HEADACHES: 0
NERVOUS/ANXIOUS: 0
CHILLS: 0
DIARRHEA: 0
PARESTHESIAS: 0
ABDOMINAL PAIN: 0
COUGH: 0
BREAST MASS: 0
SORE THROAT: 0
HEMATURIA: 0
FREQUENCY: 0
DIZZINESS: 0
ARTHRALGIAS: 1
NAUSEA: 0
HEARTBURN: 0
FEVER: 0
EYE PAIN: 0
HEMATOCHEZIA: 0
JOINT SWELLING: 1
MYALGIAS: 1
DYSURIA: 0

## 2022-05-16 ASSESSMENT — PAIN SCALES - GENERAL: PAINLEVEL: NO PAIN (0)

## 2022-05-16 NOTE — NURSING NOTE
Advance Care Planning    Health care directive given to patient today. Patient will bring a copy to next appointment.    Jannette Venegas CMA on 5/16/2022 at 7:00 AM

## 2022-05-16 NOTE — PROGRESS NOTES
SUBJECTIVE:   CC: Muriel Porras is an 62 year old woman who presents for preventive health visit.       Patient has been advised of split billing requirements and indicates understanding: Yes  Healthy Habits:     Getting at least 3 servings of Calcium per day:  Yes    Bi-annual eye exam:  Yes    Dental care twice a year:  NO    Sleep apnea or symptoms of sleep apnea:  Daytime drowsiness    Diet:  Other    Frequency of exercise:  None    Taking medications regularly:  Yes    Medication side effects:  Not applicable    PHQ-2 Total Score: 0    Additional concerns today:  Yes      The ASCVD Risk score (Rosedalepily PEREZ Jr., et al., 2013) failed to calculate for the following reasons:    The valid HDL cholesterol range is 20 to 100 mg/dL     PROBLEMS TO ADD ON...  -------------------------------------  Loss of interest in sex, last time patient had sex, her  described feeling a lump   Sister with cancer  Aging parents that are her responsibility  Ongoing x2 years, but getting worse    Cramping, lower legs and feet - ongoing, happening more often    Intermittent sores in mouth - ongoing  Roof of mouth is peeling  Usually gets one and it goes away, but now can get a few at a time  Can get them with trauma  Oranges and grapes can trigger them  Has been worse or more frequent for ~1 year  Steroids helps    Right shoulder pain, injured shoulder and had surgery 20+ years ago, weakness and aching in right arm x 1 month, worsening     Needing refills and/or labs for:  Hypothyroidism   Any hyper or hypo-thyroid symptoms? Yes, fatigue    Additional medications:  None      Today's PHQ-2 Score:   PHQ-2 ( 1999 Pfizer) 5/12/2022   Q1: Little interest or pleasure in doing things 0   Q2: Feeling down, depressed or hopeless 0   PHQ-2 Score 0   PHQ-2 Total Score (12-17 Years)- Positive if 3 or more points; Administer PHQ-A if positive -   Q1: Little interest or pleasure in doing things Not at all   Q2: Feeling down, depressed or  hopeless Not at all   PHQ-2 Score 0       Abuse: Current or Past (Physical, Sexual or Emotional) - No  Do you feel safe in your environment? Yes    Have you ever done Advance Care Planning? (For example, a Health Directive, POLST, or a discussion with a medical provider or your loved ones about your wishes): No, advance care planning information given to patient to review.  Patient plans to discuss their wishes with loved ones or provider.      Social History     Tobacco Use     Smoking status: Former Smoker     Packs/day: 1.00     Types: Cigarettes     Quit date: 2016     Years since quittin.1     Smokeless tobacco: Never Used   Substance Use Topics     Alcohol use: Yes     Comment: RARELY         Alcohol Use 2022   Prescreen: >3 drinks/day or >7 drinks/week? Not Applicable       Reviewed orders with patient.  Reviewed health maintenance and updated orders accordingly - Yes  Lab work is in process    Breast Cancer Screening:    FHS-7:   Breast CA Risk Assessment (FHS-7) 2022   Did any of your first-degree relatives have breast or ovarian cancer? Yes   Did any of your relatives have bilateral breast cancer? No   Did any man in your family have breast cancer? No   Did any woman in your family have breast and ovarian cancer? No   Did any woman in your family have breast cancer before age 50 y? No   Do you have 2 or more relatives with breast and/or ovarian cancer? Yes   Do you have 2 or more relatives with breast and/or bowel cancer? No     Mammogram Screening: Recommended mammography every 1-2 years with patient discussion and risk factor consideration  Pertinent mammograms are reviewed under the imaging tab.    History of abnormal Pap smear: NO - age 30-65 PAP every 5 years with negative HPV co-testing recommended  PAP / HPV Latest Ref Rng & Units 2019   PAP (Historical) - NIL   HPV16 NEG:Negative Negative   HPV18 NEG:Negative Negative   HRHPV NEG:Negative Negative     Reviewed and updated as  "needed this visit by clinical staff   Tobacco  Allergies  Meds   Med Hx  Surg Hx  Fam Hx  Soc Hx          Reviewed and updated as needed this visit by Provider                   Past Medical History:   Diagnosis Date     Arthritis 25 YEARS AGO?     Hypothyroidism (acquired)         Review of Systems   Constitutional: Negative for chills and fever.   HENT: Negative for congestion, ear pain, hearing loss and sore throat.    Eyes: Negative for pain and visual disturbance.   Respiratory: Negative for cough and shortness of breath.    Cardiovascular: Negative for chest pain and palpitations.   Gastrointestinal: Negative for abdominal pain, constipation, diarrhea, heartburn, hematochezia and nausea.   Breasts:  Negative for tenderness, breast mass and discharge.   Genitourinary: Negative for dysuria, frequency, genital sores, hematuria, pelvic pain, urgency, vaginal bleeding and vaginal discharge.   Musculoskeletal: Positive for arthralgias, joint swelling and myalgias.   Skin: Negative for rash.   Neurological: Negative for dizziness, weakness, headaches and paresthesias.   Psychiatric/Behavioral: The patient is not nervous/anxious.         OBJECTIVE:   /66 (BP Location: Left arm, Patient Position: Sitting, Cuff Size: Adult Regular)   Pulse 63   Temp 96.9  F (36.1  C) (Tympanic)   Resp 18   Ht 1.57 m (5' 1.81\")   Wt 80.8 kg (178 lb 3.2 oz)   LMP 09/01/2014   SpO2 99%   Breastfeeding No   BMI 32.79 kg/m    Physical Exam  GENERAL: healthy, alert and no distress  EYES: Eyes grossly normal to inspection, PERRL and conjunctivae and sclerae normal  HENT: ear canals and TM's normal, nose and mouth without ulcers or lesions  NECK: no adenopathy, no asymmetry, masses, or scars and thyroid normal to palpation  RESP: lungs clear to auscultation - no rales, rhonchi or wheezes  BREAST: normal without masses, tenderness or nipple discharge and no palpable axillary masses or adenopathy  CV: regular rates and " rhythm, normal S1 S2, no S3 or S4 and no murmur, click or rub  ABDOMEN: soft, nontender, no hepatosplenomegaly, no masses and bowel sounds normal   (female): normal female external genitalia, normal urethral meatus, vaginal mucosa pink, moist, well rugated, and normal cervix. Small, <1cm mass palpated on posterior vaginal wall during bimanual exam  MS: no gross musculoskeletal defects noted, no edema  SKIN: no suspicious lesions or rashes  NEURO: Normal strength and tone, mentation intact and speech normal  PSYCH: mentation appears normal, affect normal/bright    ASSESSMENT/PLAN:       ICD-10-CM    1. Routine general medical examination at a health care facility  Z00.00    2. High priority for 2019-nCoV vaccine  Z23 COVID-19,PF,MODERNA (18+ YRS BOOSTER .25ML)   3. Advance care planning  Z71.89    4. Screening for malignant neoplasm of cervix  Z12.4 Pap screen with HPV - recommended age 30 - 65 years   5. Postmenopausal status  Z78.0 DX Hip/Pelvis/Spine   6. Encounter for screening mammogram for breast cancer  Z12.31 *MA Screening Digital Bilateral   7. Hypothyroidism due to acquired atrophy of thyroid  E03.4 TSH with free T4 reflex     TSH with free T4 reflex   8. Elevated fasting glucose  R73.01 Hemoglobin A1c     Hemoglobin A1c   9. Decreased libido  R68.82 [facility]  Referral   10. Dyspareunia in female  N94.10 [facility]  Referral   11. Mouth problem  K13.70 Otolaryngology Referral   12. Leg cramping  R25.2 Potassium     Magnesium     Magnesium     Potassium   13. Vaginal mass  N89.8 Ob/Gyn Referral       1-6) Screenings discussed    7,8) Routine labs in process. Will change levo dose pending TSH result.    9,10) Referral to sexual health clinic. We discussed common causes of decrease libido and pain during intercourse.    11) Referral to ENT. No symptoms today    12) discussed common causes of leg cramps.     13) Referral to GYN for further eval/tx.    Patient  "Instructions   Ask your insurance about the Shingles vaccine:  1) Is it covered?  2) Where should I get it?    Lubricants for use during intercourse:  Astroglide, Good Clean Love, Coconut oil    Vaginal moisturizer, Replens, can be used three times per week, routinely    Muscle cramping: most common cause is dehydration. Increase potassium intake through your diet. Make sure you're getting a minimum of 68 ounces of water per day. Half your body weight in ounces would be ~85 ounces per day. Also, a 10-15 mins walk in the evening.         COUNSELING:  Reviewed preventive health counseling, as reflected in patient instructions    Estimated body mass index is 32.79 kg/m  as calculated from the following:    Height as of this encounter: 1.57 m (5' 1.81\").    Weight as of this encounter: 80.8 kg (178 lb 3.2 oz).    She reports that she quit smoking about 6 years ago. Her smoking use included cigarettes. She smoked 1.00 pack per day. She has never used smokeless tobacco.      Counseling Resources:  ATP IV Guidelines  Pooled Cohorts Equation Calculator  Breast Cancer Risk Calculator  BRCA-Related Cancer Risk Assessment: FHS-7 Tool  FRAX Risk Assessment  ICSI Preventive Guidelines  Dietary Guidelines for Americans, 2010  USDA's MyPlate  ASA Prophylaxis  Lung CA Screening    LAY Smith [facility] ERNESTO  "

## 2022-05-16 NOTE — PATIENT INSTRUCTIONS
Ask your insurance about the Shingles vaccine:  1) Is it covered?  2) Where should I get it?    Lubricants for use during intercourse:  Astroglide, Good Clean Love, Coconut oil    Vaginal moisturizer, Replens, can be used three times per week, routinely    Muscle cramping: most common cause is dehydration. Increase potassium intake through your diet. Make sure you're getting a minimum of 68 ounces of water per day. Half your body weight in ounces would be ~85 ounces per day. Also, a 10-15 mins walk in the evening.      Preventive Health Recommendations  Female Ages 50 - 64    Yearly exam: See your health care provider every year in order to  Review health changes.   Discuss preventive care.    Review your medicines if your doctor has prescribed any.    Get a Pap test every three years (unless you have an abnormal result and your provider advises testing more often).  If you get Pap tests with HPV test, you only need to test every 5 years, unless you have an abnormal result.   You do not need a Pap test if your uterus was removed (hysterectomy) and you have not had cancer.  You should be tested each year for STDs (sexually transmitted diseases) if you're at risk.   Have a mammogram every 1 to 2 years.  Have a colonoscopy at age 50, or have a yearly FIT test (stool test). These exams screen for colon cancer.    Have a cholesterol test every 5 years, or more often if advised.  Have a diabetes test (fasting glucose) every three years. If you are at risk for diabetes, you should have this test more often.   If you are at risk for osteoporosis (brittle bone disease), think about having a bone density scan (DEXA).    Shots: Get a flu shot each year. Get a tetanus shot every 10 years.    Nutrition:   Eat at least 5 servings of fruits and vegetables each day.  Eat whole-grain bread, whole-wheat pasta and brown rice instead of white grains and rice.  Get adequate Calcium and Vitamin D.     Lifestyle  Exercise at least 150  minutes a week (30 minutes a day, 5 days a week). This will help you control your weight and prevent disease.  Limit alcohol to one drink per day.  No smoking.   Wear sunscreen to prevent skin cancer.   See your dentist every six months for an exam and cleaning.  See your eye doctor every 1 to 2 years.

## 2022-05-18 LAB
BKR LAB AP GYN ADEQUACY: NORMAL
BKR LAB AP GYN INTERPRETATION: NORMAL
BKR LAB AP HPV REFLEX: NORMAL
BKR LAB AP PREVIOUS ABNORMAL: NORMAL
PATH REPORT.COMMENTS IMP SPEC: NORMAL
PATH REPORT.COMMENTS IMP SPEC: NORMAL
PATH REPORT.RELEVANT HX SPEC: NORMAL

## 2022-05-19 ENCOUNTER — OFFICE VISIT (OUTPATIENT)
Dept: PODIATRY | Facility: CLINIC | Age: 62
End: 2022-05-19

## 2022-05-19 ENCOUNTER — ANCILLARY PROCEDURE (OUTPATIENT)
Dept: GENERAL RADIOLOGY | Facility: CLINIC | Age: 62
End: 2022-05-19
Attending: PODIATRIST
Payer: COMMERCIAL

## 2022-05-19 VITALS
HEIGHT: 62 IN | SYSTOLIC BLOOD PRESSURE: 120 MMHG | WEIGHT: 178 LBS | DIASTOLIC BLOOD PRESSURE: 70 MMHG | BODY MASS INDEX: 32.76 KG/M2

## 2022-05-19 DIAGNOSIS — M76.72 PERONEAL TENDONITIS, LEFT: ICD-10-CM

## 2022-05-19 DIAGNOSIS — Z98.890 S/P FOOT SURGERY, LEFT: Primary | ICD-10-CM

## 2022-05-19 DIAGNOSIS — R26.9 GAIT DISTURBANCE: ICD-10-CM

## 2022-05-19 DIAGNOSIS — Z98.890 S/P FOOT SURGERY, LEFT: ICD-10-CM

## 2022-05-19 PROCEDURE — 99213 OFFICE O/P EST LOW 20 MIN: CPT | Performed by: PODIATRIST

## 2022-05-19 PROCEDURE — 73630 X-RAY EXAM OF FOOT: CPT | Mod: TC | Performed by: RADIOLOGY

## 2022-05-19 NOTE — PATIENT INSTRUCTIONS
Thank you for choosing [facility] Podiatry / Foot & Ankle Surgery!    DR HATCH'S CLINIC:  [facility]   [id number]   [place] [address]      TRIAGE LINE: [phone]  APPOINTMENTS: [phone]  RADIOLOGY: [phone]  SET UP SURGERY: [phone]  BILLING QUESTIONS: [phone]  FAX: [phone]         Follow up: call or mychart for PT or orthotics orderd      OVER THE COUNTER INSERTS    Most of these can be found at your local [employer], [employer], or online:    [employer]   Sofsole Fit Open[employer]ark   Power Step   Walk-Fit ([employer])  *For heel pain* Arch Cradles  *For heel pain*       ** A good high quality over the counter insert should cost around $40-$50    ** Capsulitis/Metarasalgia - try adding a metatarsal pad on your inserts or find an insert with one built in      PRICE THERAPY  Many aches and pains throughout the foot and ankle can be helped with many simple treatments. This is usually described as PRICE Therapy.      P - Protection - often times, inflammation/pain in the lower extremity is not able to improve simply because the areas involved are never allowed to rest. Every step we take can bother the problematic area. Protecting those areas is an important step in the healing process. This may involve a walking cast boot, a special insert/orthotic device, an ankle brace, or simply avoiding barefoot walking.    R - Rest - in addition to protecting the foot/ankle, resting is an important, but often times difficult, treatment option. Getting off your feet when they bother you, and specifically avoiding activities that cause pain/discomfort, are very beneficial to prevent, and treat, foot/ankle pain.      I - Ice - icing regularly can help to decrease inflammation and swelling in the foot, thus decreasing pain. Using an ice pack or a bag of frozen veggies works very well. Ice for 20 minutes multiple times per day as  needed.  Do not place the ice directly on the skin as this can cause tissue damage.    C - Compression - using a compression wrap or an ACE wrap can help to decrease swelling, which can help to decrease pain. Wearing the wraps is generally not needed at night, but they should be worn on a regular basis when you are going to be on your feet for prolonged periods as gravity tends to pull fluids down to your feet/ankles.    E - Elevation - elevating your lower extremities multiple times daily for 15-20 minutes can help to decrease swelling, which works well in decreasing pain levels.    NSAID/Tylenol - Anti-inflammatories like Aleve or ibuprofen, and/or a pain medication, such as Tylenol, can help to improve pain levels and get the issue resolved sooner rather than later. Anyone with liver issues should be careful with Tylenol, and anyone with high blood pressure or heart, stomach or kidney issues should be careful with anti-inflammatories. Please ask if you have questions about these medications, including dosage.

## 2022-05-19 NOTE — PROGRESS NOTES
"Foot & Ankle Surgery   May 19, 2022    S:  Pt is seen today for evaluation of left lower extremity pain.  She underwent flatfoot reconstruction by myself on 7/14/2021, including gastroc recession, PT tendon debridement and repair with the talonavicular joint fusion.  She states it still can be swollen and sore.  She points to the posterior lateral left ankle and the dorsal forefoot as the areas of discomfort.  Specifically the posterior lateral ankle can be increasingly sore as of late.  No specific injury noted.  She feels like she is standing differently on this foot, placing more weight along the lateral aspect of the foot.    Vitals:    05/19/22 1030   BP: 120/70   Weight: 80.7 kg (178 lb)   Height: 1.575 m (5' 2\")   '      ROS - Pos for CC.  Patient denies current nausea, vomiting, chills, fevers, belly pain, calf pain, chest pain or SOB.  Complete remainder of ROS it otherwise neg.      PE:  Gen:   No apparent distress  Eye:    Visual scanning without deficit  Ear:    Response to auditory stimuli wnl  Lung:    Non-labored breathing on RA noted  Abd:    NTND per patient report  Lymph:    Mild swelling lateral left ankle and dorsal foot  Vasc:    Pulses palpable, CFT minimally delayed  Neuro:    Light touch sensation intact to all sensory nerve distributions without paresthesias  Derm:    Neg for nodules, lesions or ulcerations  MSK:    Left lower extremity -she is tender along the course the peroneal tendons posterior and distal to the fibula.  The tendons feel slightly nodular today.  No evidence of rupture noted today.  No PT tendon or talonavicular joint pain noted today.  Calf:    Neg for redness, swelling or tenderness      Imaging: 3 views weightbearing left foot -previous instrumented arthrodesis of the talonavicular joint.  No hardware pathology is noted.  While there does appear to be some lucency along the medial aspect of the joint on the AP view, full bridging is seen on the lateral view.  No other " acute pathology noted.    Assessment:  62 year old female with left lower extremity peroneal tendinitis approximate 10 months status post flatfoot reconstruction      Plan:  Discussed etiologies, anatomy and options  1.  Left lower extremity peroneal tendinitis approximate 10 months status post flatfoot reconstruction  -I personally reviewed and interpreted the x-ray results today.  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  We will start with OTC inserts but she will call/MyChart for custom orthotics as needed  -She still has the walking boot at home and I advised utilizing this currently  -RICE/NSAID versus Tylenol as needed based on pain  -We discussed the option for PT.  She will consider and call as needed      Follow up: As needed or sooner with acute issues           Marko Maya, GAL FACMobile [facility] FACFAOM  Podiatric Foot & Ankle Surgeon  [facility]  [phone]    Disclaimer: This note consists of symbols derived from keyboarding, dictation and/or voice recognition software. As a result, there may be errors in the script that have gone undetected. Please consider this when interpreting information found in this chart.

## 2022-05-20 LAB
HUMAN PAPILLOMA VIRUS 16 DNA: NEGATIVE
HUMAN PAPILLOMA VIRUS 18 DNA: NEGATIVE
HUMAN PAPILLOMA VIRUS FINAL DIAGNOSIS: NORMAL
HUMAN PAPILLOMA VIRUS OTHER HR: NEGATIVE

## 2022-06-01 ENCOUNTER — TELEPHONE (OUTPATIENT)
Dept: FAMILY MEDICINE | Facility: CLINIC | Age: 62
End: 2022-06-01
Payer: COMMERCIAL

## 2022-06-13 ENCOUNTER — ANCILLARY PROCEDURE (OUTPATIENT)
Dept: MAMMOGRAPHY | Facility: CLINIC | Age: 62
End: 2022-06-13
Attending: PHYSICIAN ASSISTANT
Payer: COMMERCIAL

## 2022-06-13 DIAGNOSIS — Z12.31 ENCOUNTER FOR SCREENING MAMMOGRAM FOR BREAST CANCER: ICD-10-CM

## 2022-06-13 PROCEDURE — 77067 SCR MAMMO BI INCL CAD: CPT | Mod: TC | Performed by: RADIOLOGY

## 2022-06-22 PROBLEM — M25.572 PAIN IN JOINT INVOLVING ANKLE AND FOOT, LEFT: Status: RESOLVED | Noted: 2021-10-14 | Resolved: 2022-06-22

## 2022-07-14 ENCOUNTER — OFFICE VISIT (OUTPATIENT)
Dept: FAMILY MEDICINE | Facility: CLINIC | Age: 62
End: 2022-07-14
Payer: COMMERCIAL

## 2022-07-14 ENCOUNTER — ANCILLARY PROCEDURE (OUTPATIENT)
Dept: GENERAL RADIOLOGY | Facility: CLINIC | Age: 62
End: 2022-07-14
Attending: PHYSICIAN ASSISTANT
Payer: COMMERCIAL

## 2022-07-14 VITALS
SYSTOLIC BLOOD PRESSURE: 136 MMHG | DIASTOLIC BLOOD PRESSURE: 83 MMHG | TEMPERATURE: 97.5 F | WEIGHT: 182 LBS | HEIGHT: 61 IN | OXYGEN SATURATION: 97 % | RESPIRATION RATE: 16 BRPM | HEART RATE: 69 BPM | BODY MASS INDEX: 34.36 KG/M2

## 2022-07-14 DIAGNOSIS — M25.561 CHRONIC PAIN OF RIGHT KNEE: ICD-10-CM

## 2022-07-14 DIAGNOSIS — M25.561 CHRONIC PAIN OF RIGHT KNEE: Primary | ICD-10-CM

## 2022-07-14 DIAGNOSIS — G89.29 CHRONIC PAIN OF RIGHT KNEE: ICD-10-CM

## 2022-07-14 DIAGNOSIS — G89.29 CHRONIC PAIN OF RIGHT KNEE: Primary | ICD-10-CM

## 2022-07-14 PROCEDURE — 73562 X-RAY EXAM OF KNEE 3: CPT | Mod: TC | Performed by: RADIOLOGY

## 2022-07-14 PROCEDURE — 99214 OFFICE O/P EST MOD 30 MIN: CPT | Performed by: PHYSICIAN ASSISTANT

## 2022-07-14 NOTE — PROGRESS NOTES
Assessment & Plan     Chronic pain of right knee    - XR Knee Right 3 Views; Future    Xray pending, ? Bakers cyst, meniscal issue, or other    Will refer to specialist depending on results ortho surgery versus nonsurgical      No follow-ups on file.    [facility]    Billin min spent on patient today including chart review, history, exam, and explaining treatment plan and follow-up.     Christel Llamas is a 62 year old accompanied by her Self, presenting for the following health issues:  Cyst, Health Maintenance, and Knee Pain      History of Present Illness       Reason for visit:  Knee/leg pain  Symptom onset:  More than a month  Symptoms include:  Knee/ leg pain aching  Symptom intensity:  Moderate  Symptom progression:  Worsening  Had these symptoms before:  Yes  Has tried/received treatment for these symptoms:  Yes  Previous treatment was successful:  No  What makes it worse:  Bending, straightening, walking  What makes it better:  Heat, ice, elevation, ibuprofen    She eats 2-3 servings of fruits and vegetables daily.She consumes 0 sweetened beverage(s) daily.She exercises with enough effort to increase her heart rate 9 or less minutes per day.  She exercises with enough effort to increase her heart rate 3 or less days per week.   She is taking medications regularly.       R sided back of knee pain. Had on/off for years but worse now. Really bothers her. Going up and down stairs it hurts. Bending is the worst or fully extended. No known injury.  Had surgery on left ankle/foot last year. googled it and said bakers cyst she is wondering if she has this.     Has varicose veins. No lump that she has felt. No paresthesias.     Conservative treatments help but she doesn't want to continue them indefinitely, also it is getting worse.     No erythema or fevers or warmth.         Review of Systems   Constitutional, HEENT, cardiovascular, pulmonary, GI, ,  "musculoskeletal, neuro, skin, endocrine and psych systems are negative, except as otherwise noted.      Objective    /83   Pulse 69   Temp 97.5  F (36.4  C) (Tympanic)   Resp 16   Ht 1.549 m (5' 1\")   Wt 82.6 kg (182 lb)   LMP 09/01/2014   SpO2 97%   Breastfeeding No   BMI 34.39 kg/m    Body mass index is 34.39 kg/m .  Physical Exam   GENERAL: alert, no distress and obese  RESP: lungs clear to auscultation - no rales, rhonchi or wheezes  CV: regular rate and rhythm, normal S1 S2, no S3 or S4, no murmur, click or rub, no peripheral edema and peripheral pulses strong  NEURO: Normal strength and tone, mentation intact and speech normal  PSYCH: mentation appears normal, affect normal/bright    Ortho: R Knee-No gross deformity.  No erythema.  No effusion.  No ecchymosis. No warmth.  Tender to palpation popliteal fossa.   Range of motion decreased with flexion, pain with extension but she can do it.  Sensation intact distally.  Distal pulses strong. Hip, knee, and ankle strength 5/5 and equal bilaterally.  Anterior drawer sign negative. Valgus(MCL)/varus (LCL) testing negative for pain.  McMurrays positive for pain on right side.       Xray- pending            .  ..    "

## 2022-07-15 ENCOUNTER — MYC MEDICAL ADVICE (OUTPATIENT)
Dept: FAMILY MEDICINE | Facility: CLINIC | Age: 62
End: 2022-07-15

## 2022-07-15 DIAGNOSIS — G89.29 CHRONIC PAIN OF RIGHT KNEE: Primary | ICD-10-CM

## 2022-07-15 DIAGNOSIS — M25.561 CHRONIC PAIN OF RIGHT KNEE: Primary | ICD-10-CM

## 2022-07-15 NOTE — RESULT ENCOUNTER NOTE
PLEASE CALL PATIENT:  Dear Muriel,      It was a pleasure to see you at your recent office visit.  Your test results are listed below.  Knee xray shows some mild arthritis. No fluid noted in knee. No bakers cyst seen on xray. I would have you meet with our nonsurgical ortho as next step.  Referral placed.        If you have any questions or concerns, please call the clinic at [phone].    Sincerely,  Najma Mortensen PA-C

## 2022-07-21 ENCOUNTER — OFFICE VISIT (OUTPATIENT)
Dept: ORTHOPEDICS | Facility: CLINIC | Age: 62
End: 2022-07-21
Payer: COMMERCIAL

## 2022-07-21 VITALS
DIASTOLIC BLOOD PRESSURE: 79 MMHG | HEIGHT: 62 IN | WEIGHT: 190.4 LBS | BODY MASS INDEX: 35.04 KG/M2 | SYSTOLIC BLOOD PRESSURE: 145 MMHG | HEART RATE: 67 BPM

## 2022-07-21 DIAGNOSIS — M17.11 PRIMARY OSTEOARTHRITIS OF RIGHT KNEE: Primary | ICD-10-CM

## 2022-07-21 DIAGNOSIS — M25.561 ACUTE PAIN OF RIGHT KNEE: ICD-10-CM

## 2022-07-21 PROCEDURE — 20610 DRAIN/INJ JOINT/BURSA W/O US: CPT | Mod: RT | Performed by: ORTHOPAEDIC SURGERY

## 2022-07-21 PROCEDURE — 99243 OFF/OP CNSLTJ NEW/EST LOW 30: CPT | Mod: 25 | Performed by: ORTHOPAEDIC SURGERY

## 2022-07-21 RX ORDER — BUPIVACAINE HYDROCHLORIDE 2.5 MG/ML
4 INJECTION, SOLUTION INFILTRATION; PERINEURAL
Status: SHIPPED | OUTPATIENT
Start: 2022-07-21

## 2022-07-21 RX ORDER — METHYLPREDNISOLONE ACETATE 80 MG/ML
80 INJECTION, SUSPENSION INTRA-ARTICULAR; INTRALESIONAL; INTRAMUSCULAR; SOFT TISSUE
Status: SHIPPED | OUTPATIENT
Start: 2022-07-21

## 2022-07-21 RX ADMIN — BUPIVACAINE HYDROCHLORIDE 4 ML: 2.5 INJECTION, SOLUTION INFILTRATION; PERINEURAL at 14:44

## 2022-07-21 RX ADMIN — METHYLPREDNISOLONE ACETATE 80 MG: 80 INJECTION, SUSPENSION INTRA-ARTICULAR; INTRALESIONAL; INTRAMUSCULAR; SOFT TISSUE at 14:44

## 2022-07-21 ASSESSMENT — PAIN SCALES - GENERAL: PAINLEVEL: SEVERE PAIN (6)

## 2022-07-21 NOTE — PROGRESS NOTES
CHIEF COMPLAINT:   Chief Complaint   Patient presents with     Right Knee - Pain     Onset: about 2 months. NKI. Pain just came on and has gotten worse. Pain starts posterior and radiates in both directions. She is unable to do stairs right now. Walking makes it worse. She has pain with getting up from a sitting position if she sits for a period of time. No tx.    .    Muriel Porras is seen today in the [facility] for evaluation of right knee pain at the request of Najma Mortensen PA-C        HISTORY OF PRESENT ILLNESS    Muriel Porras is a 62 year old female seen for evaluation of ongoing right knee pain with no known injury.   Pain has been present for 2 months. Just came on and getting worse. Locates pain in the back of the knee and radiates up and down and throughout the knee. Pain with walking, stairs, sit to stand. The past couple of days feels like it wants to hyperextend. Treatment with ice, heat, elevation, ibuprofen.    Has aching pain at rest, night. Tosses and turns a lot at night to get comfortable, needs to keep knee slightly bent.     Years and years ago would get this same pain behind her legs, then would eventually go away. Now started again, seems similar, but now affecting the whole leg.    Left ankle/foot surgery a year ago, so her activities have actually been less the past year, but has been relying  On the right leg more since then as a result.      Present symptoms: pain posteriorly and diffuse, pain sharp, dull/achy , moderate pain, no catching/popping, no locking, no giving way.    Pain severity: 6/10  Frequency of symptoms: are constant  Exacerbating Factors: weight bearing, stairs, [id number], prolonged sitting, prolonged standing  Relieving Factors: over the counter medications, rest  Night Pain: Yes  Pain while at rest: Yes   Numbness or tingling: today, at times, toes feel numb/tingly   Patient has tried:     NSAIDS: Yes      Physical  Therapy: No      Activity modification: No      Bracing: No      Injections: No      Ice: Yes      Assistive device:  No      Other PMH:  has a past medical history of Arthritis (25 YEARS AGO?) and Hypothyroidism (acquired).    She has no past medical history of Cancer (H), Cerebral infarction (H), Congestive heart failure (H), COPD (chronic obstructive pulmonary disease) (H), Depressive disorder, Diabetes (H), Heart disease, History of blood transfusion, Hypertension, or Uncomplicated asthma.  Patient Active Problem List   Diagnosis     Hypothyroidism due to acquired atrophy of thyroid     Osteopenia of both hips     Acquired flat foot, left     Benign paroxysmal positional vertigo, left       Surgical Hx:  has a past surgical history that includes Colonoscopy with CO2 insufflation (N/A, 09/13/2019); Reconstruct flat foot (Left, 07/14/2021); Breast surgery (16 YEARS AGO); and colonoscopy (10/2019 ?).    Medications:   Current Outpatient Medications:      Cholecalciferol (VITAMIN D3) 125 MCG (5000 UT) CHEW, , Disp: , Rfl:      levothyroxine (SYNTHROID/LEVOTHROID) 200 MCG tablet, Take 1 tablet (200 mcg) by mouth every morning - dose increase 5/18/22, Disp: 90 tablet, Rfl: 0    Allergies:   Allergies   Allergen Reactions     Aspirin      Mouth lesions      Tylenol [Acetaminophen] Nausea and Vomiting       Social Hx: customer service/ desk job.   reports that she quit smoking about 6 years ago. Her smoking use included cigarettes. She smoked 1.00 pack per day. She has never used smokeless tobacco. She reports current alcohol use. She reports that she does not use drugs.    Family Hx: family history includes Cancer in her sister; Cerebrovascular Disease (age of onset: 84) in her mother; Colon Cancer in her maternal grandfather and sister; Colon Polyps in her daughter; Hypothyroidism in her father, sister, and sister; Myocardial Infarction in her father; No Known Problems in her brother and sister; Pacemaker in her  "father; Thyroid Disease in her father and sister.    REVIEW OF SYSTEMS: 10 point ROS neg other than the symptoms noted above in the HPI and PMH. Notables include  CONSTITUTIONAL:NEGATIVE for fever, chills, change in weight  INTEGUMENTARY/SKIN: NEGATIVE for worrisome rashes, moles or lesions  MUSCULOSKELETAL:See HPI above  NEURO: NEGATIVE for weakness, dizziness or paresthesias    PHYSICAL EXAM:  BP (!) 145/79   Pulse 67   Ht 1.575 m (5' 2\")   Wt 86.4 kg (190 lb 6.4 oz)   LMP 09/01/2014   BMI 34.82 kg/m     GENERAL APPEARANCE: healthy, alert, no distress  SKIN: no suspicious lesions or rashes  NEURO: Normal strength and tone, mentation intact and speech normal  PSYCH:  mentation appears normal and affect normal, not anxious  RESPIRATORY: No increased work of breathing.  HANDS: no clubbing, nail pitting  LYMPH: no palpable popliteal lymphadenopathy.    BILATERAL LOWER EXTREMITIES:  Gait: normal  Alignment: neutral.  No gross deformities or masses.  No Quad atrophy, strength normal.  Intact sensation deep peroneal nerve, superficial peroneal nerve, med/lat tibial nerve, sural nerve, saphenous nerve  Intact EHL, EDL, TA, FHL, GS, quadriceps hamstrings and hip flexors  Toes warm and well perfused, brisk capillary refill. Palpable 2+ dp pulses.  Bilateral calf soft and nttp or squeeze.  DTRs: achilles 2+, patella 2+.  Edema: trace  Diffuse bilateral lower extremity varicose and spider veins  Bilateral pes planus, severe. Medial left ankle/hindfoot surgical scar.    LEFT KNEE EXAM:    Skin: intact, no ecchymosis or erythema  ROM: full extension to 130 flexion  Tight hamstrings on straight leg raise.  Effusion: none  Tender: NTTP med/lat joint line, anterior or posterior knee  McMurrays: negative    MCL: stable, and non-painful at both 0 and 30 degrees knee flexion  Varus stress: stable, and non-painful at both 0 and 30 degrees knee flexion  Lachmans: neg, firm endpoint  Posterior Drawer stable  Patellofemoral joint: " "               Apprehension: negative              Crepitations: mild    RIGHT KNEE EXAM:    Skin: intact, no ecchymosis or erythema  ROM: full extension to 115 flexion, posterior discomfort.  Tight hamstrings on straight leg raise.  Effusion: trace  Tender: popliteal fossa with posterior fullness/warmth; medial joint line and pes  Nontender to palpation lateral joint line, anterior knee  McMurrays: negative    MCL: stable, and non-painful at both 0 and 30 degrees knee flexion  Varus stress: stable, and non-painful at both 0 and 30 degrees knee flexion  Lachmans: neg, firm endpoint  Posterior Drawer stable  Patellofemoral joint:                Apprehension: negative              Crepitations: mild   Grind: positive.    X-RAY:  3 views right knee from 7/14/2022 were reviewed in clinic today. On my review, no obvious fractures or dislocations. Anatomic alignment right knee. No acute displaced right knee fracture. Mild-moderate medial and patellofemoral compartment  right knee osteoarthritis. No right knee joint effusion. No significant anterior right knee soft tissue swelling.           ASSESSMENT/PLAN: Muriel Porras is a 62 year old female with acute right knee pain, primary osteoarthritis, likely popliteal cyst.     * reviewed imaging studies with patient, showing arthritic changes, or wearing of the cartilage in the knee. This can be caused by normal \"wear and tear\" over the years or following prior injury to the knee.    Treatment typically starts nonsurgically. Surgical indication for total knee arthroplasty  when nonsurgical management is no longer effective.    Non-surgical treatment for knee arthritis includes:    * rest, sitting  * Activity modification - avoid impact activities or activities that aggravate symptoms.  * NSAIDS (non-steroidal anti-inflammatory medications; e.g. Aleve, advil, motrin, ibuprofen) - regular use for inflammation ( twice daily or three times daily), with food, as long as no " "contra-indications Please discuss with primary care doctor if needed  * ice, 15-20 minutes at a time several times a day or as needed.  * Strengthening of quadriceps muscles  * Physical Therapy for strengthening, stretching and range of motion exercises of legs  * Tylenol as needed for pain, consider Tylenol arthritis or similar  * Weight loss: Weight loss:  Body mass index is 34.82 kg/m .. weight loss benefits, not only for the current pain symptoms, but also overall health. Recommend a good diet plan that works for the patient, with the assistance of a dietician or primary care doctor as needed. Also, a good, low-impact exercise program for at least 20 minutes per day, 3 times per week, such as exercise bike, elliptical , or pool.  * Exercise: low impact such as stationary bike, elliptical, pool.  * Injections: cortisone versus viscosupplementation (hyaluronic acid, \"rooster comb\", \"gel shots\"); risks and perceived benefits discussed today. Patient elects  to proceed.  * Bracing: bracing the knee may offer some relief of symptoms when worn and provide some stability.  * over the counter supplements such as glucosamine and chondroitin sulfate may help with joint pain.  * topical ointments may help as well    * return to clinic as needed.          Marcelo Eubanks M.D., M.S.  Dept. of Orthopaedic Surgery  [facility]    Large Joint Injection/Arthocentesis: R knee joint    Date/Time: 7/21/2022 2:44 PM  Performed by: Yao Bennett PA  Authorized by: Marcelo Eubanks MD     Indications:  Pain and osteoarthritis  Needle Size:  22 G  Guidance: landmark guided    Approach:  Anteromedial  Location:  Knee      Medications:  80 mg methylPREDNISolone 80 MG/ML; 4 mL bupivacaine 0.25 %  Outcome:  Tolerated well, no immediate complications  Procedure discussed: discussed risks, benefits, and alternatives    Consent Given by:  Patient  Prep: patient was prepped and draped in usual sterile fashion  "

## 2022-07-21 NOTE — LETTER
7/21/2022         RE: Muriel Porras  [address]  [place] [address]        Dear Colleague,    Thank you for referring your patient, Muriel Porras, to the [facility] [place]. Please see a copy of my visit note below.    CHIEF COMPLAINT:   Chief Complaint   Patient presents with     Right Knee - Pain     Onset: about 2 months. NKI. Pain just came on and has gotten worse. Pain starts posterior and radiates in both directions. She is unable to do stairs right now. Walking makes it worse. She has pain with getting up from a sitting position if she sits for a period of time. No tx.    .    Muriel Porras is seen today in the [facility] for evaluation of right knee pain at the request of Najma Mortensen PA-C        HISTORY OF PRESENT ILLNESS    Muriel Porras is a 62 year old female seen for evaluation of ongoing right knee pain with no known injury.   Pain has been present for 2 months. Just came on and getting worse. Locates pain in the back of the knee and radiates up and down and throughout the knee. Pain with walking, stairs, sit to stand. The past couple of days feels like it wants to hyperextend. Treatment with ice, heat, elevation, ibuprofen.    Has aching pain at rest, night. Tosses and turns a lot at night to get comfortable, needs to keep knee slightly bent.     Years and years ago would get this same pain behind her legs, then would eventually go away. Now started again, seems similar, but now affecting the whole leg.    Left ankle/foot surgery a year ago, so her activities have actually been less the past year, but has been relying  On the right leg more since then as a result.      Present symptoms: pain posteriorly and diffuse, pain sharp, dull/achy , moderate pain, no catching/popping, no locking, no giving way.    Pain severity: 6/10  Frequency of symptoms: are constant  Exacerbating Factors: weight bearing, stairs,  [id number], prolonged sitting, prolonged standing  Relieving Factors: over the counter medications, rest  Night Pain: Yes  Pain while at rest: Yes   Numbness or tingling: today, at times, toes feel numb/tingly   Patient has tried:     NSAIDS: Yes      Physical Therapy: No      Activity modification: No      Bracing: No      Injections: No      Ice: Yes      Assistive device:  No      Other PMH:  has a past medical history of Arthritis (25 YEARS AGO?) and Hypothyroidism (acquired).    She has no past medical history of Cancer (H), Cerebral infarction (H), Congestive heart failure (H), COPD (chronic obstructive pulmonary disease) (H), Depressive disorder, Diabetes (H), Heart disease, History of blood transfusion, Hypertension, or Uncomplicated asthma.  Patient Active Problem List   Diagnosis     Hypothyroidism due to acquired atrophy of thyroid     Osteopenia of both hips     Acquired flat foot, left     Benign paroxysmal positional vertigo, left       Surgical Hx:  has a past surgical history that includes Colonoscopy with CO2 insufflation (N/A, 09/13/2019); Reconstruct flat foot (Left, 07/14/2021); Breast surgery (16 YEARS AGO); and colonoscopy (10/2019 ?).    Medications:   Current Outpatient Medications:      Cholecalciferol (VITAMIN D3) 125 MCG (5000 UT) CHEW, , Disp: , Rfl:      levothyroxine (SYNTHROID/LEVOTHROID) 200 MCG tablet, Take 1 tablet (200 mcg) by mouth every morning - dose increase 5/18/22, Disp: 90 tablet, Rfl: 0    Allergies:   Allergies   Allergen Reactions     Aspirin      Mouth lesions      Tylenol [Acetaminophen] Nausea and Vomiting       Social Hx: customer service/ desk job.   reports that she quit smoking about 6 years ago. Her smoking use included cigarettes. She smoked 1.00 pack per day. She has never used smokeless tobacco. She reports current alcohol use. She reports that she does not use drugs.    Family Hx: family history includes Cancer in her sister; Cerebrovascular Disease (age of  "onset: 84) in her mother; Colon Cancer in her maternal grandfather and sister; Colon Polyps in her daughter; Hypothyroidism in her father, sister, and sister; Myocardial Infarction in her father; No Known Problems in her brother and sister; Pacemaker in her father; Thyroid Disease in her father and sister.    REVIEW OF SYSTEMS: 10 point ROS neg other than the symptoms noted above in the HPI and PMH. Notables include  CONSTITUTIONAL:NEGATIVE for fever, chills, change in weight  INTEGUMENTARY/SKIN: NEGATIVE for worrisome rashes, moles or lesions  MUSCULOSKELETAL:See HPI above  NEURO: NEGATIVE for weakness, dizziness or paresthesias    PHYSICAL EXAM:  BP (!) 145/79   Pulse 67   Ht 1.575 m (5' 2\")   Wt 86.4 kg (190 lb 6.4 oz)   LMP 09/01/2014   BMI 34.82 kg/m     GENERAL APPEARANCE: healthy, alert, no distress  SKIN: no suspicious lesions or rashes  NEURO: Normal strength and tone, mentation intact and speech normal  PSYCH:  mentation appears normal and affect normal, not anxious  RESPIRATORY: No increased work of breathing.  HANDS: no clubbing, nail pitting  LYMPH: no palpable popliteal lymphadenopathy.    BILATERAL LOWER EXTREMITIES:  Gait: normal  Alignment: neutral.  No gross deformities or masses.  No Quad atrophy, strength normal.  Intact sensation deep peroneal nerve, superficial peroneal nerve, med/lat tibial nerve, sural nerve, saphenous nerve  Intact EHL, EDL, TA, FHL, GS, quadriceps hamstrings and hip flexors  Toes warm and well perfused, brisk capillary refill. Palpable 2+ dp pulses.  Bilateral calf soft and nttp or squeeze.  DTRs: achilles 2+, patella 2+.  Edema: trace  Diffuse bilateral lower extremity varicose and spider veins  Bilateral pes planus, severe. Medial left ankle/hindfoot surgical scar.    LEFT KNEE EXAM:    Skin: intact, no ecchymosis or erythema  ROM: full extension to 130 flexion  Tight hamstrings on straight leg raise.  Effusion: none  Tender: NTTP med/lat joint line, anterior or " "posterior knee  McMurrays: negative    MCL: stable, and non-painful at both 0 and 30 degrees knee flexion  Varus stress: stable, and non-painful at both 0 and 30 degrees knee flexion  Lachmans: neg, firm endpoint  Posterior Drawer stable  Patellofemoral joint:                Apprehension: negative              Crepitations: mild    RIGHT KNEE EXAM:    Skin: intact, no ecchymosis or erythema  ROM: full extension to 115 flexion, posterior discomfort.  Tight hamstrings on straight leg raise.  Effusion: trace  Tender: popliteal fossa with posterior fullness/warmth; medial joint line and pes  Nontender to palpation lateral joint line, anterior knee  McMurrays: negative    MCL: stable, and non-painful at both 0 and 30 degrees knee flexion  Varus stress: stable, and non-painful at both 0 and 30 degrees knee flexion  Lachmans: neg, firm endpoint  Posterior Drawer stable  Patellofemoral joint:                Apprehension: negative              Crepitations: mild   Grind: positive.    X-RAY:  3 views right knee from 7/14/2022 were reviewed in clinic today. On my review, no obvious fractures or dislocations. Anatomic alignment right knee. No acute displaced right knee fracture. Mild-moderate medial and patellofemoral compartment  right knee osteoarthritis. No right knee joint effusion. No significant anterior right knee soft tissue swelling.           ASSESSMENT/PLAN: Muriel Porras is a 62 year old female with acute right knee pain, primary osteoarthritis, likely popliteal cyst.     * reviewed imaging studies with patient, showing arthritic changes, or wearing of the cartilage in the knee. This can be caused by normal \"wear and tear\" over the years or following prior injury to the knee.    Treatment typically starts nonsurgically. Surgical indication for total knee arthroplasty  when nonsurgical management is no longer effective.    Non-surgical treatment for knee arthritis includes:    * rest, sitting  * Activity " "modification - avoid impact activities or activities that aggravate symptoms.  * NSAIDS (non-steroidal anti-inflammatory medications; e.g. Aleve, advil, motrin, ibuprofen) - regular use for inflammation ( twice daily or three times daily), with food, as long as no contra-indications Please discuss with primary care doctor if needed  * ice, 15-20 minutes at a time several times a day or as needed.  * Strengthening of quadriceps muscles  * Physical Therapy for strengthening, stretching and range of motion exercises of legs  * Tylenol as needed for pain, consider Tylenol arthritis or similar  * Weight loss: Weight loss:  Body mass index is 34.82 kg/m .. weight loss benefits, not only for the current pain symptoms, but also overall health. Recommend a good diet plan that works for the patient, with the assistance of a dietician or primary care doctor as needed. Also, a good, low-impact exercise program for at least 20 minutes per day, 3 times per week, such as exercise bike, elliptical , or pool.  * Exercise: low impact such as stationary bike, elliptical, pool.  * Injections: cortisone versus viscosupplementation (hyaluronic acid, \"rooster comb\", \"gel shots\"); risks and perceived benefits discussed today. Patient elects  to proceed.  * Bracing: bracing the knee may offer some relief of symptoms when worn and provide some stability.  * over the counter supplements such as glucosamine and chondroitin sulfate may help with joint pain.  * topical ointments may help as well    * return to clinic as needed.          Marcelo Eubanks M.D., M.S.  Dept. of Orthopaedic Surgery  [facility]    Large Joint Injection/Arthocentesis: R knee joint    Date/Time: 7/21/2022 2:44 PM  Performed by: Yao Bennett PA  Authorized by: Marcelo Eubanks MD     Indications:  Pain and osteoarthritis  Needle Size:  22 G  Guidance: landmark guided    Approach:  Anteromedial  Location:  Knee      Medications:  80 mg " methylPREDNISolone 80 MG/ML; 4 mL bupivacaine 0.25 %  Outcome:  Tolerated well, no immediate complications  Procedure discussed: discussed risks, benefits, and alternatives    Consent Given by:  Patient  Prep: patient was prepped and draped in usual sterile fashion              Again, thank you for allowing me to participate in the care of your patient.        Sincerely,        Marcelo Eubanks MD

## 2022-08-15 ENCOUNTER — MYC MEDICAL ADVICE (OUTPATIENT)
Dept: ORTHOPEDICS | Facility: CLINIC | Age: 62
End: 2022-08-15

## 2022-08-23 ASSESSMENT — KOOS JR
HOW SEVERE IS YOUR KNEE STIFFNESS AFTER FIRST WAKING IN MORNING: MODERATE
KOOS JR SCORING: 34.17
GOING UP OR DOWN STAIRS: SEVERE
TWISING OR PIVOTING ON KNEE: EXTREME
STRAIGHTENING KNEE FULLY: SEVERE
RISING FROM SITTING: SEVERE
STANDING UPRIGHT: SEVERE
BENDING TO THE FLOOR TO PICK UP OBJECT: SEVERE

## 2022-08-24 ENCOUNTER — OFFICE VISIT (OUTPATIENT)
Dept: ORTHOPEDICS | Facility: CLINIC | Age: 62
End: 2022-08-24
Payer: COMMERCIAL

## 2022-08-24 VITALS — WEIGHT: 180 LBS | HEIGHT: 62 IN | BODY MASS INDEX: 33.13 KG/M2

## 2022-08-24 DIAGNOSIS — M25.561 ACUTE PAIN OF RIGHT KNEE: Primary | ICD-10-CM

## 2022-08-24 PROCEDURE — 99213 OFFICE O/P EST LOW 20 MIN: CPT | Performed by: ORTHOPAEDIC SURGERY

## 2022-08-24 ASSESSMENT — PAIN SCALES - GENERAL: PAINLEVEL: MODERATE PAIN (5)

## 2022-08-24 NOTE — PROGRESS NOTES
CHIEF COMPLAINT:   Chief Complaint   Patient presents with     Right Knee - Pain     Arthritis     Right knee pain. Had injection on 7/21/22. It helped with the posterior knee pain, but not the anterior. Has been icing/heat, tylenol and ibuprofen. Made worse with stairs and radiating down the tibia   .  HISTORY OF PRESENT ILLNESS    Muriel Porras is a 62 year old female seen for evaluation of ongoing right knee pain with no known injury.   Pain has been present for 3 months. Just came on and was getting worse. We saw her 7/21/2022 and provided an intra-articular injection, which helped with the pain behind the knee, but not notices more pain in front of the knee. She's been using ice/heat, ibuprofen, tylenol. Pain worse with stairs than walking, sit to stand.. Pain can radiate down the leg to the foot.    Has aching pain at rest, night. Tosses and turns a lot at night to get comfortable, needs to keep knee slightly bent.     Years and years ago would get this same pain behind her legs, then would eventually go away. Now started again, seems similar, but now affecting the whole leg.    Left ankle/foot surgery a year ago, so her activities have actually been less the past year, but has been relying  On the right leg more since then as a result.      Present symptoms: pain posteriorly and diffuse, pain sharp, dull/achy , moderate pain, no catching/popping, no locking, no giving way.    Pain severity: 5/10  Frequency of symptoms: are constant  Exacerbating Factors: weight bearing, stairs, [id number], prolonged sitting, prolonged standing  Relieving Factors: over the counter medications, rest  Night Pain: Yes  Pain while at rest: Yes   Numbness or tingling: today, at times, toes feel numb/tingly   Patient has tried:     NSAIDS: Yes      Physical Therapy: No      Activity modification: No      Bracing: No      Injections: No      Ice: Yes      Assistive device:  No      Other PMH:  has a past medical history of  Arthritis (25 YEARS AGO?) and Hypothyroidism (acquired).    She has no past medical history of Cancer (H), Cerebral infarction (H), Congestive heart failure (H), COPD (chronic obstructive pulmonary disease) (H), Depressive disorder, Diabetes (H), Heart disease, History of blood transfusion, Hypertension, or Uncomplicated asthma.  Patient Active Problem List   Diagnosis     Hypothyroidism due to acquired atrophy of thyroid     Osteopenia of both hips     Acquired flat foot, left     Benign paroxysmal positional vertigo, left       Surgical Hx:  has a past surgical history that includes Colonoscopy with CO2 insufflation (N/A, 09/13/2019); Reconstruct flat foot (Left, 07/14/2021); Breast surgery (16 YEARS AGO); and colonoscopy (10/2019 ?).    Medications:   Current Outpatient Medications:      Cholecalciferol (VITAMIN D3) 125 MCG (5000 UT) CHEW, , Disp: , Rfl:      levothyroxine (SYNTHROID/LEVOTHROID) 200 MCG tablet, Take 1 tablet (200 mcg) by mouth every morning - dose increase 5/18/22, Disp: 90 tablet, Rfl: 0    Current Facility-Administered Medications:      bupivacaine (MARCAINE) 0.25 % injection 4 mL, 4 mL, , , Marcelo Eubanks MD, 4 mL at 07/21/22 1444     methylPREDNISolone (DEPO-MEDROL) injection 80 mg, 80 mg, , , Marcelo Eubanks MD, 80 mg at 07/21/22 1444    Allergies:   Allergies   Allergen Reactions     Aspirin      Mouth lesions      Tylenol [Acetaminophen] Nausea and Vomiting       Social Hx: customer service/ desk job.   reports that she quit smoking about 6 years ago. Her smoking use included cigarettes. She smoked 1.00 pack per day. She has never used smokeless tobacco. She reports current alcohol use. She reports that she does not use drugs.    Family Hx: family history includes Cancer in her sister; Cerebrovascular Disease (age of onset: 84) in her mother; Colon Cancer in her maternal grandfather and sister; Colon Polyps in her daughter; Hypothyroidism in her father, sister, and sister;  Myocardial Infarction in her father; No Known Problems in her brother and sister; Pacemaker in her father; Thyroid Disease in her father and sister.    REVIEW OF SYSTEMS: 10 point ROS neg other than the symptoms noted above in the HPI and PMH. Notables include  CONSTITUTIONAL:NEGATIVE for fever, chills, change in weight  INTEGUMENTARY/SKIN: NEGATIVE for worrisome rashes, moles or lesions  MUSCULOSKELETAL:See HPI above  NEURO: NEGATIVE for weakness, dizziness or paresthesias    PHYSICAL EXAM:  [facility] 09/01/2014    GENERAL APPEARANCE: healthy, alert, no distress  SKIN: no suspicious lesions or rashes  NEURO: Normal strength and tone, mentation intact and speech normal  PSYCH:  mentation appears normal and affect normal, not anxious  RESPIRATORY: No increased work of breathing.  HANDS: no clubbing, nail pitting  LYMPH: no palpable popliteal lymphadenopathy.    BILATERAL LOWER EXTREMITIES:  Gait: normal  Alignment: neutral.  No gross deformities or masses.  No Quad atrophy, strength normal.  Intact sensation deep peroneal nerve, superficial peroneal nerve, med/lat tibial nerve, sural nerve, saphenous nerve  Intact EHL, EDL, TA, FHL, GS, quadriceps hamstrings and hip flexors  Toes warm and well perfused, brisk capillary refill. Palpable 2+ dp pulses.  Bilateral calf soft and nttp or squeeze.  DTRs: achilles 2+, patella 2+.  Edema: trace  Diffuse bilateral lower extremity varicose and spider veins  Bilateral pes planus, severe. Medial left ankle/hindfoot surgical scar.    LEFT KNEE EXAM:    Skin: intact, no ecchymosis or erythema  ROM: full extension to 130 flexion  Tight hamstrings on straight leg raise.  Effusion: none  Tender: NTTP med/lat joint line, anterior or posterior knee  McMurrays: negative    MCL: stable, and non-painful at both 0 and 30 degrees knee flexion  Varus stress: stable, and non-painful at both 0 and 30 degrees knee flexion  Lachmans: neg, firm endpoint  Posterior Drawer stable  Patellofemoral joint:  "               Apprehension: negative              Crepitations: mild    RIGHT KNEE EXAM:    Skin: intact, no ecchymosis or erythema  ROM: full extension to 115 flexion, posterior discomfort.  Tight hamstrings on straight leg raise.  Effusion: trace  Tender: popliteal fossa with posterior fullness; medial joint line and pes, medial collateral ligament origin medial femoral epicondyle.  Nontender to palpation lateral joint line, anterior knee; she is also not tender over the injection site.  McMurrays: negative    MCL: stable, and mildly-painful at both 0 and 30 degrees knee flexion  Varus stress: stable, and non-painful at both 0 and 30 degrees knee flexion  Lachmans: neg, firm endpoint  Posterior Drawer stable  Patellofemoral joint:                Apprehension: negative              Crepitations: mild   Grind: positive.    X-RAY:  3 views right knee from 7/14/2022 again reviewed with the patient, showing no obvious fractures or dislocations. Anatomic alignment right knee. No acute displaced right knee fracture. Mild-moderate medial and patellofemoral compartment  right knee osteoarthritis. No right knee joint effusion. No significant anterior right knee soft tissue swelling.           ASSESSMENT/PLAN: Muriel Porras is a 62 year old female with acute right knee pain, primary osteoarthritis, likely medial meniscus tear, medial collateral ligament sprain.     * reviewed imaging studies with patient, showing arthritic changes, or wearing of the cartilage in the knee. This can be caused by normal \"wear and tear\" over the years or following prior injury to the knee.  * unclear if now more focal pain due to medial collateral ligament, possible medial meniscus tear, or underlying osteoarthritis  * will refer for an MRI for further evaluation.      Non-surgical treatment for knee arthritis includes:    * rest, sitting  * Activity modification - avoid impact activities or activities that aggravate symptoms.  * NSAIDS " "(non-steroidal anti-inflammatory medications; e.g. Aleve, advil, motrin, ibuprofen) - regular use for inflammation ( twice daily or three times daily), with food, as long as no contra-indications Please discuss with primary care doctor if needed  * ice, 15-20 minutes at a time several times a day or as needed.  * Strengthening of quadriceps muscles  * Physical Therapy for strengthening, stretching and range of motion exercises of legs  * Tylenol as needed for pain, consider Tylenol arthritis or similar  * Weight loss: Weight loss:  Body mass index is 32.92 kg/m .. weight loss benefits, not only for the current pain symptoms, but also overall health. Recommend a good diet plan that works for the patient, with the assistance of a dietician or primary care doctor as needed. Also, a good, low-impact exercise program for at least 20 minutes per day, 3 times per week, such as exercise bike, elliptical , or pool.  * Exercise: low impact such as stationary bike, elliptical, pool.  * Injections: cortisone versus viscosupplementation (hyaluronic acid, \"rooster comb\", \"gel shots\"); risks and perceived benefits discussed today. Consider visco in future pending MRI findings.  * Bracing: bracing the knee may offer some relief of symptoms when worn and provide some stability.  * over the counter supplements such as glucosamine and chondroitin sulfate may help with joint pain.  * topical ointments may help as well    * return to clinic as needed.          Marcelo Eubanks M.D., M.S.  Dept. of Orthopaedic Surgery  [facility]    Procedures    "

## 2022-08-24 NOTE — LETTER
8/24/2022         RE: Muriel Porras  [address]  [place] [address]        Dear Colleague,    Thank you for referring your patient, Muriel Porras, to the [facility]. Please see a copy of my visit note below.    CHIEF COMPLAINT:   Chief Complaint   Patient presents with     Right Knee - Pain     Arthritis     Right knee pain. Had injection on 7/21/22. It helped with the posterior knee pain, but not the anterior. Has been icing/heat, tylenol and ibuprofen. Made worse with stairs and radiating down the tibia   .  HISTORY OF PRESENT ILLNESS    Muriel Porras is a 62 year old female seen for evaluation of ongoing right knee pain with no known injury.   Pain has been present for 3 months. Just came on and was getting worse. We saw her 7/21/2022 and provided an intra-articular injection, which helped with the pain behind the knee, but not notices more pain in front of the knee. She's been using ice/heat, ibuprofen, tylenol. Pain worse with stairs than walking, sit to stand.. Pain can radiate down the leg to the foot.    Has aching pain at rest, night. Tosses and turns a lot at night to get comfortable, needs to keep knee slightly bent.     Years and years ago would get this same pain behind her legs, then would eventually go away. Now started again, seems similar, but now affecting the whole leg.    Left ankle/foot surgery a year ago, so her activities have actually been less the past year, but has been relying  On the right leg more since then as a result.      Present symptoms: pain posteriorly and diffuse, pain sharp, dull/achy , moderate pain, no catching/popping, no locking, no giving way.    Pain severity: 5/10  Frequency of symptoms: are constant  Exacerbating Factors: weight bearing, stairs, [id number], prolonged sitting, prolonged standing  Relieving Factors: over the counter medications, rest  Night Pain: Yes  Pain while at rest: Yes   Numbness or tingling:  today, at times, toes feel numb/tingly   Patient has tried:     NSAIDS: Yes      Physical Therapy: No      Activity modification: No      Bracing: No      Injections: No      Ice: Yes      Assistive device:  No      Other PMH:  has a past medical history of Arthritis (25 YEARS AGO?) and Hypothyroidism (acquired).    She has no past medical history of Cancer (H), Cerebral infarction (H), Congestive heart failure (H), COPD (chronic obstructive pulmonary disease) (H), Depressive disorder, Diabetes (H), Heart disease, History of blood transfusion, Hypertension, or Uncomplicated asthma.  Patient Active Problem List   Diagnosis     Hypothyroidism due to acquired atrophy of thyroid     Osteopenia of both hips     Acquired flat foot, left     Benign paroxysmal positional vertigo, left       Surgical Hx:  has a past surgical history that includes Colonoscopy with CO2 insufflation (N/A, 09/13/2019); Reconstruct flat foot (Left, 07/14/2021); Breast surgery (16 YEARS AGO); and colonoscopy (10/2019 ?).    Medications:   Current Outpatient Medications:      Cholecalciferol (VITAMIN D3) 125 MCG (5000 UT) CHEW, , Disp: , Rfl:      levothyroxine (SYNTHROID/LEVOTHROID) 200 MCG tablet, Take 1 tablet (200 mcg) by mouth every morning - dose increase 5/18/22, Disp: 90 tablet, Rfl: 0    Current Facility-Administered Medications:      bupivacaine (MARCAINE) 0.25 % injection 4 mL, 4 mL, , , Marcelo Eubanks MD, 4 mL at 07/21/22 1444     methylPREDNISolone (DEPO-MEDROL) injection 80 mg, 80 mg, , , Marcelo Eubanks MD, 80 mg at 07/21/22 1444    Allergies:   Allergies   Allergen Reactions     Aspirin      Mouth lesions      Tylenol [Acetaminophen] Nausea and Vomiting       Social Hx: customer service/ desk job.   reports that she quit smoking about 6 years ago. Her smoking use included cigarettes. She smoked 1.00 pack per day. She has never used smokeless tobacco. She reports current alcohol use. She reports that she does not use  drugs.    Family Hx: family history includes Cancer in her sister; Cerebrovascular Disease (age of onset: 84) in her mother; Colon Cancer in her maternal grandfather and sister; Colon Polyps in her daughter; Hypothyroidism in her father, sister, and sister; Myocardial Infarction in her father; No Known Problems in her brother and sister; Pacemaker in her father; Thyroid Disease in her father and sister.    REVIEW OF SYSTEMS: 10 point ROS neg other than the symptoms noted above in the HPI and PMH. Notables include  CONSTITUTIONAL:NEGATIVE for fever, chills, change in weight  INTEGUMENTARY/SKIN: NEGATIVE for worrisome rashes, moles or lesions  MUSCULOSKELETAL:See HPI above  NEURO: NEGATIVE for weakness, dizziness or paresthesias    PHYSICAL EXAM:  [facility] 09/01/2014    GENERAL APPEARANCE: healthy, alert, no distress  SKIN: no suspicious lesions or rashes  NEURO: Normal strength and tone, mentation intact and speech normal  PSYCH:  mentation appears normal and affect normal, not anxious  RESPIRATORY: No increased work of breathing.  HANDS: no clubbing, nail pitting  LYMPH: no palpable popliteal lymphadenopathy.    BILATERAL LOWER EXTREMITIES:  Gait: normal  Alignment: neutral.  No gross deformities or masses.  No Quad atrophy, strength normal.  Intact sensation deep peroneal nerve, superficial peroneal nerve, med/lat tibial nerve, sural nerve, saphenous nerve  Intact EHL, EDL, TA, FHL, GS, quadriceps hamstrings and hip flexors  Toes warm and well perfused, brisk capillary refill. Palpable 2+ dp pulses.  Bilateral calf soft and nttp or squeeze.  DTRs: achilles 2+, patella 2+.  Edema: trace  Diffuse bilateral lower extremity varicose and spider veins  Bilateral pes planus, severe. Medial left ankle/hindfoot surgical scar.    LEFT KNEE EXAM:    Skin: intact, no ecchymosis or erythema  ROM: full extension to 130 flexion  Tight hamstrings on straight leg raise.  Effusion: none  Tender: NTTP med/lat joint line, anterior or  "posterior knee  McMurrays: negative    MCL: stable, and non-painful at both 0 and 30 degrees knee flexion  Varus stress: stable, and non-painful at both 0 and 30 degrees knee flexion  Lachmans: neg, firm endpoint  Posterior Drawer stable  Patellofemoral joint:                Apprehension: negative              Crepitations: mild    RIGHT KNEE EXAM:    Skin: intact, no ecchymosis or erythema  ROM: full extension to 115 flexion, posterior discomfort.  Tight hamstrings on straight leg raise.  Effusion: trace  Tender: popliteal fossa with posterior fullness; medial joint line and pes, medial collateral ligament origin medial femoral epicondyle.  Nontender to palpation lateral joint line, anterior knee; she is also not tender over the injection site.  McMurrays: negative    MCL: stable, and mildly-painful at both 0 and 30 degrees knee flexion  Varus stress: stable, and non-painful at both 0 and 30 degrees knee flexion  Lachmans: neg, firm endpoint  Posterior Drawer stable  Patellofemoral joint:                Apprehension: negative              Crepitations: mild   Grind: positive.    X-RAY:  3 views right knee from 7/14/2022 again reviewed with the patient, showing no obvious fractures or dislocations. Anatomic alignment right knee. No acute displaced right knee fracture. Mild-moderate medial and patellofemoral compartment  right knee osteoarthritis. No right knee joint effusion. No significant anterior right knee soft tissue swelling.           ASSESSMENT/PLAN: Muriel Porras is a 62 year old female with acute right knee pain, primary osteoarthritis, likely medial meniscus tear, medial collateral ligament sprain.     * reviewed imaging studies with patient, showing arthritic changes, or wearing of the cartilage in the knee. This can be caused by normal \"wear and tear\" over the years or following prior injury to the knee.  * unclear if now more focal pain due to medial collateral ligament, possible medial meniscus " "tear, or underlying osteoarthritis  * will refer for an MRI for further evaluation.      Non-surgical treatment for knee arthritis includes:    * rest, sitting  * Activity modification - avoid impact activities or activities that aggravate symptoms.  * NSAIDS (non-steroidal anti-inflammatory medications; e.g. Aleve, advil, motrin, ibuprofen) - regular use for inflammation ( twice daily or three times daily), with food, as long as no contra-indications Please discuss with primary care doctor if needed  * ice, 15-20 minutes at a time several times a day or as needed.  * Strengthening of quadriceps muscles  * Physical Therapy for strengthening, stretching and range of motion exercises of legs  * Tylenol as needed for pain, consider Tylenol arthritis or similar  * Weight loss: Weight loss:  Body mass index is 32.92 kg/m .. weight loss benefits, not only for the current pain symptoms, but also overall health. Recommend a good diet plan that works for the patient, with the assistance of a dietician or primary care doctor as needed. Also, a good, low-impact exercise program for at least 20 minutes per day, 3 times per week, such as exercise bike, elliptical , or pool.  * Exercise: low impact such as stationary bike, elliptical, pool.  * Injections: cortisone versus viscosupplementation (hyaluronic acid, \"rooster comb\", \"gel shots\"); risks and perceived benefits discussed today. Consider visco in future pending MRI findings.  * Bracing: bracing the knee may offer some relief of symptoms when worn and provide some stability.  * over the counter supplements such as glucosamine and chondroitin sulfate may help with joint pain.  * topical ointments may help as well    * return to clinic as needed.          Marcelo Eubanks M.D., M.S.  Dept. of Orthopaedic Surgery  [facility]    Procedures        Again, thank you for allowing me to participate in the care of your patient.        Sincerely,        Marcelo Sherwood " MD Cha

## 2022-09-01 ENCOUNTER — ANCILLARY PROCEDURE (OUTPATIENT)
Dept: MRI IMAGING | Facility: CLINIC | Age: 62
End: 2022-09-01
Attending: ORTHOPAEDIC SURGERY
Payer: COMMERCIAL

## 2022-09-01 DIAGNOSIS — M25.561 ACUTE PAIN OF RIGHT KNEE: ICD-10-CM

## 2022-09-01 PROCEDURE — 73721 MRI JNT OF LWR EXTRE W/O DYE: CPT | Mod: RT | Performed by: RADIOLOGY

## 2022-09-11 ENCOUNTER — HEALTH MAINTENANCE LETTER (OUTPATIENT)
Age: 62
End: 2022-09-11

## 2022-10-07 PROBLEM — H81.12 BENIGN PAROXYSMAL POSITIONAL VERTIGO, LEFT: Status: RESOLVED | Noted: 2021-11-18 | Resolved: 2022-10-07

## 2023-01-12 ENCOUNTER — APPOINTMENT (OUTPATIENT)
Dept: ULTRASOUND IMAGING | Facility: CLINIC | Age: 63
End: 2023-01-12
Attending: FAMILY MEDICINE
Payer: COMMERCIAL

## 2023-01-12 ENCOUNTER — HOSPITAL ENCOUNTER (EMERGENCY)
Facility: CLINIC | Age: 63
Discharge: HOME | End: 2023-01-12
Attending: FAMILY MEDICINE | Admitting: FAMILY MEDICINE
Payer: COMMERCIAL

## 2023-01-12 VITALS
TEMPERATURE: 97.2 F | HEIGHT: 62 IN | HEART RATE: 59 BPM | WEIGHT: 180 LBS | OXYGEN SATURATION: 96 % | DIASTOLIC BLOOD PRESSURE: 59 MMHG | SYSTOLIC BLOOD PRESSURE: 127 MMHG | BODY MASS INDEX: 33.13 KG/M2 | RESPIRATION RATE: 11 BRPM

## 2023-01-12 DIAGNOSIS — R10.13 EPIGASTRIC PAIN: ICD-10-CM

## 2023-01-12 LAB
ALBUMIN SERPL BCG-MCNC: 4.2 G/DL (ref 3.5–5.2)
ALP SERPL-CCNC: 70 U/L (ref 35–104)
ALT SERPL W P-5'-P-CCNC: 21 U/L (ref 10–35)
ANION GAP SERPL CALCULATED.3IONS-SCNC: 9 MMOL/L (ref 7–15)
AST SERPL W P-5'-P-CCNC: 25 U/L (ref 10–35)
BASOPHILS # BLD AUTO: 0.1 10E3/UL (ref 0–0.2)
BASOPHILS NFR BLD AUTO: 1 %
BILIRUB SERPL-MCNC: 0.3 MG/DL
BUN SERPL-MCNC: 14.9 MG/DL (ref 8–23)
CALCIUM SERPL-MCNC: 9.3 MG/DL (ref 8.8–10.2)
CHLORIDE SERPL-SCNC: 104 MMOL/L (ref 98–107)
CREAT SERPL-MCNC: 0.7 MG/DL (ref 0.51–0.95)
DEPRECATED HCO3 PLAS-SCNC: 28 MMOL/L (ref 22–29)
EOSINOPHIL # BLD AUTO: 0.3 10E3/UL (ref 0–0.7)
EOSINOPHIL NFR BLD AUTO: 5 %
ERYTHROCYTE [DISTWIDTH] IN BLOOD BY AUTOMATED COUNT: 12.4 % (ref 10–15)
GFR SERPL CREATININE-BSD FRML MDRD: >90 ML/MIN/1.73M2
GLUCOSE SERPL-MCNC: 111 MG/DL (ref 70–99)
HCT VFR BLD AUTO: 38.1 % (ref 35–47)
HGB BLD-MCNC: 13 G/DL (ref 11.7–15.7)
HOLD SPECIMEN: NORMAL
HOLD SPECIMEN: NORMAL
IMM GRANULOCYTES # BLD: 0 10E3/UL
IMM GRANULOCYTES NFR BLD: 0 %
LIPASE SERPL-CCNC: 34 U/L (ref 13–60)
LYMPHOCYTES # BLD AUTO: 2.1 10E3/UL (ref 0.8–5.3)
LYMPHOCYTES NFR BLD AUTO: 33 %
MCH RBC QN AUTO: 29.8 PG (ref 26.5–33)
MCHC RBC AUTO-ENTMCNC: 34.1 G/DL (ref 31.5–36.5)
MCV RBC AUTO: 87 FL (ref 78–100)
MONOCYTES # BLD AUTO: 0.4 10E3/UL (ref 0–1.3)
MONOCYTES NFR BLD AUTO: 5 %
NEUTROPHILS # BLD AUTO: 3.6 10E3/UL (ref 1.6–8.3)
NEUTROPHILS NFR BLD AUTO: 56 %
NRBC # BLD AUTO: 0 10E3/UL
NRBC BLD AUTO-RTO: 0 /100
PLATELET # BLD AUTO: 244 10E3/UL (ref 150–450)
POTASSIUM SERPL-SCNC: 3.9 MMOL/L (ref 3.4–5.3)
PROT SERPL-MCNC: 7 G/DL (ref 6.4–8.3)
RBC # BLD AUTO: 4.36 10E6/UL (ref 3.8–5.2)
SODIUM SERPL-SCNC: 141 MMOL/L (ref 136–145)
TROPONIN T SERPL HS-MCNC: 9 NG/L
WBC # BLD AUTO: 6.5 10E3/UL (ref 4–11)

## 2023-01-12 PROCEDURE — 93005 ELECTROCARDIOGRAM TRACING: CPT | Performed by: FAMILY MEDICINE

## 2023-01-12 PROCEDURE — 76705 ECHO EXAM OF ABDOMEN: CPT

## 2023-01-12 PROCEDURE — 36415 COLL VENOUS BLD VENIPUNCTURE: CPT | Performed by: FAMILY MEDICINE

## 2023-01-12 PROCEDURE — 85004 AUTOMATED DIFF WBC COUNT: CPT | Performed by: FAMILY MEDICINE

## 2023-01-12 PROCEDURE — 84484 ASSAY OF TROPONIN QUANT: CPT | Performed by: FAMILY MEDICINE

## 2023-01-12 PROCEDURE — 258N000003 HC RX IP 258 OP 636: Performed by: FAMILY MEDICINE

## 2023-01-12 PROCEDURE — 83690 ASSAY OF LIPASE: CPT | Performed by: FAMILY MEDICINE

## 2023-01-12 PROCEDURE — 96374 THER/PROPH/DIAG INJ IV PUSH: CPT | Performed by: FAMILY MEDICINE

## 2023-01-12 PROCEDURE — 93010 ELECTROCARDIOGRAM REPORT: CPT | Performed by: FAMILY MEDICINE

## 2023-01-12 PROCEDURE — 250N000011 HC RX IP 250 OP 636: Performed by: FAMILY MEDICINE

## 2023-01-12 PROCEDURE — 80053 COMPREHEN METABOLIC PANEL: CPT | Performed by: FAMILY MEDICINE

## 2023-01-12 PROCEDURE — 99285 EMERGENCY DEPT VISIT HI MDM: CPT | Mod: 25 | Performed by: FAMILY MEDICINE

## 2023-01-12 PROCEDURE — 96375 TX/PRO/DX INJ NEW DRUG ADDON: CPT | Performed by: FAMILY MEDICINE

## 2023-01-12 PROCEDURE — 96361 HYDRATE IV INFUSION ADD-ON: CPT | Performed by: FAMILY MEDICINE

## 2023-01-12 PROCEDURE — 99284 EMERGENCY DEPT VISIT MOD MDM: CPT | Mod: 25 | Performed by: FAMILY MEDICINE

## 2023-01-12 RX ORDER — HYDROMORPHONE HYDROCHLORIDE 1 MG/ML
0.5 INJECTION, SOLUTION INTRAMUSCULAR; INTRAVENOUS; SUBCUTANEOUS
Status: DISCONTINUED | OUTPATIENT
Start: 2023-01-12 | End: 2023-01-13 | Stop reason: HOSPADM

## 2023-01-12 RX ORDER — OXYCODONE HYDROCHLORIDE 5 MG/1
5 TABLET ORAL EVERY 6 HOURS PRN
Qty: 6 TABLET | Refills: 0 | Status: SHIPPED | OUTPATIENT
Start: 2023-01-12 | End: 2023-12-12

## 2023-01-12 RX ORDER — ONDANSETRON 4 MG/1
4 TABLET, ORALLY DISINTEGRATING ORAL EVERY 8 HOURS PRN
Qty: 10 TABLET | Refills: 0 | Status: SHIPPED | OUTPATIENT
Start: 2023-01-12 | End: 2023-12-12

## 2023-01-12 RX ORDER — SODIUM CHLORIDE 9 MG/ML
INJECTION, SOLUTION INTRAVENOUS CONTINUOUS
Status: DISCONTINUED | OUTPATIENT
Start: 2023-01-12 | End: 2023-01-12 | Stop reason: CLARIF

## 2023-01-12 RX ORDER — ONDANSETRON 2 MG/ML
4 INJECTION INTRAMUSCULAR; INTRAVENOUS ONCE
Status: COMPLETED | OUTPATIENT
Start: 2023-01-12 | End: 2023-01-12

## 2023-01-12 RX ADMIN — HYDROMORPHONE HYDROCHLORIDE 0.5 MG: 1 INJECTION, SOLUTION INTRAMUSCULAR; INTRAVENOUS; SUBCUTANEOUS at 21:10

## 2023-01-12 RX ADMIN — FAMOTIDINE 20 MG: 10 INJECTION INTRAVENOUS at 21:43

## 2023-01-12 RX ADMIN — ONDANSETRON 4 MG: 2 INJECTION INTRAMUSCULAR; INTRAVENOUS at 21:08

## 2023-01-12 RX ADMIN — SODIUM CHLORIDE 500 ML: 9 INJECTION, SOLUTION INTRAVENOUS at 21:08

## 2023-01-12 ASSESSMENT — ENCOUNTER SYMPTOMS
SHORTNESS OF BREATH: 0
HEADACHES: 0
FEVER: 0
COUGH: 0
DIAPHORESIS: 0
WHEEZING: 0
FREQUENCY: 0
SORE THROAT: 0
DIARRHEA: 0
VOMITING: 0
CHILLS: 0
CONSTIPATION: 0
NAUSEA: 1
BLOOD IN STOOL: 0
PALPITATIONS: 0
SINUS PRESSURE: 0
DYSURIA: 0
ABDOMINAL PAIN: 1

## 2023-01-12 ASSESSMENT — ACTIVITIES OF DAILY LIVING (ADL): ADLS_ACUITY_SCORE: 35

## 2023-01-13 NOTE — ED PROVIDER NOTES
History     Chief Complaint   Patient presents with     Abdominal Pain     HPI  Muriel Porras is a 62 year old female who presents with a history of hypothyroidism, osteopenia, no prior abdominal surgery.  She presents here with onset starting in the middle of last night of epigastric pain radiating to the right scapular region.  Associated with nausea without vomiting.  No diarrhea.  She had waves of upper abdominal pain that is persisted until now.  Her base pain is around 4 out of 10 in worsened significantly at times.  She was able to eat mashed potatoes with a minimal amount of butter.  Did not note an obvious increase in pain after this.  Last night after they did not a potentially fatty meal she did have her pain.  She stayed awake much of the night because of her pain.  She tried Pepto-Bismol and antacids without effect.  She still has gallbladder and appendix and in fact all of her abdominal organs.    Does not use alcohol or tobacco.  No history of peptic ulcers.  She does use relatively frequent ibuprofen  Allergies:  Allergies   Allergen Reactions     Aspirin      Mouth lesions      Tylenol [Acetaminophen] Nausea and Vomiting       Problem List:    Patient Active Problem List    Diagnosis Date Noted     Acquired flat foot, left 05/10/2021     Priority: Medium     Added automatically from request for surgery [id number]       Osteopenia of both hips 07/15/2019     Priority: Medium     Hypothyroidism due to acquired atrophy of thyroid 04/28/2016     Priority: Medium        Past Medical History:    Past Medical History:   Diagnosis Date     Arthritis 25 YEARS AGO?     Hypothyroidism (acquired)        Past Surgical History:    Past Surgical History:   Procedure Laterality Date     BREAST SURGERY  16 YEARS AGO     COLONOSCOPY  10/2019 ?     COLONOSCOPY WITH CO2 INSUFFLATION N/A 09/13/2019    Procedure: COLONOSCOPY, WITH CO2 INSUFFLATION;  Surgeon: Marshall Kaba DO;  Location: [facility] OR     RECONSTRUCT FLAT  "FOOT Left 2021    Procedure: LEFT FLATFOOT RECONSTRUCTION;  Surgeon: Marko Maya DPM;  Location: [facility] OR       Family History:    Family History   Problem Relation Age of Onset     Colon Cancer Maternal Grandfather      Cerebrovascular Disease Mother 84     Myocardial Infarction Father         Stents     Pacemaker Father      Hypothyroidism Father      Thyroid Disease Father      Hypothyroidism Sister      Cancer Sister      Colon Cancer Sister      No Known Problems Brother      Colon Polyps Daughter      Hypothyroidism Sister      Thyroid Disease Sister      No Known Problems Sister        Social History:  Marital Status:   [2]  Social History     Tobacco Use     Smoking status: Former     Packs/day: 1.00     Types: Cigarettes     Quit date: 2016     Years since quittin.7     Smokeless tobacco: Never   Vaping Use     Vaping Use: Never used   Substance Use Topics     Alcohol use: Yes     Comment: RARELY     Drug use: No        Medications:    Cholecalciferol (VITAMIN D3) 125 MCG (5000 UT) CHEW  levothyroxine (SYNTHROID/LEVOTHROID) 200 MCG tablet          Review of Systems   Constitutional: Negative for chills, diaphoresis and fever.   HENT: Negative for ear pain, sinus pressure and sore throat.    Eyes: Negative for visual disturbance.   Respiratory: Negative for cough, shortness of breath and wheezing.    Cardiovascular: Negative for chest pain and palpitations.   Gastrointestinal: Positive for abdominal pain and nausea. Negative for blood in stool, constipation, diarrhea and vomiting.   Genitourinary: Negative for dysuria, frequency and urgency.   Skin: Negative for rash.   Neurological: Negative for headaches.   All other systems reviewed and are negative.      Physical Exam   BP: (!) 172/77  Pulse: 70  Temp: 97.2  F (36.2  C)  Resp: 16  Height: 157.5 cm (5' 2\")  Weight: 81.6 kg (180 lb)  SpO2: 99 %      Physical Exam  Constitutional:       General: She is in acute distress.      " Appearance: She is not diaphoretic.   Eyes:      Conjunctiva/sclera: Conjunctivae normal.   Cardiovascular:      Rate and Rhythm: Normal rate and regular rhythm.      Heart sounds: No murmur heard.  Pulmonary:      Effort: Pulmonary effort is normal. No respiratory distress.      Breath sounds: Normal breath sounds. No stridor. No wheezing or rhonchi.   Abdominal:      General: Abdomen is flat. There is no distension.      Palpations: Abdomen is soft. There is no mass.      Tenderness: There is abdominal tenderness. There is no guarding.   Musculoskeletal:      Cervical back: Neck supple.      Right lower leg: No edema.      Left lower leg: No edema.   Skin:     Coloration: Skin is not pale.      Findings: No rash.   Neurological:      General: No focal deficit present.      Mental Status: She is alert.      Motor: No weakness.          ED Course                 Procedures                EKG Interpretation:      Interpreted by Xavier Porter MD  EKG done at 1946 hrs. demonstrates a sinus rhythm 60 bpm normal axis.  There is no ST change.  No T wave changes.  Normal R progression and no Q waves.  Normal intervals.  Normal conduction.  No ectopy.  Impression sinus rhythm 60 bpm no acute change.    Critical Care time:  none               Results for orders placed or performed during the hospital encounter of 01/12/23 (from the past 24 hour(s))   CBC with platelets differential    Narrative    The following orders were created for panel order CBC with platelets differential.  Procedure                               Abnormality         Status                     ---------                               -----------         ------                     CBC with platelets and d...[[id number]]                      Final result                 Please view results for these tests on the individual orders.   Comprehensive metabolic panel   Result Value Ref Range    Sodium 141 136 - 145 mmol/L    Potassium 3.9 3.4 - 5.3 mmol/L     Chloride 104 98 - 107 mmol/L    Carbon Dioxide (CO2) 28 22 - 29 mmol/L    Anion Gap 9 7 - 15 mmol/L    Urea Nitrogen 14.9 8.0 - 23.0 mg/dL    Creatinine 0.70 0.51 - 0.95 mg/dL    Calcium 9.3 8.8 - 10.2 mg/dL    Glucose 111 (H) 70 - 99 mg/dL    Alkaline Phosphatase 70 35 - 104 U/L    AST 25 10 - 35 U/L    ALT 21 10 - 35 U/L    Protein Total 7.0 6.4 - 8.3 g/dL    Albumin 4.2 3.5 - 5.2 g/dL    Bilirubin Total 0.3 <=1.2 mg/dL    GFR Estimate >90 >60 mL/min/1.73m2   Lipase   Result Value Ref Range    Lipase 34 13 - 60 U/L   CBC with platelets and differential   Result Value Ref Range    WBC Count 6.5 4.0 - 11.0 10e3/uL    RBC Count 4.36 3.80 - 5.20 10e6/uL    Hemoglobin 13.0 11.7 - 15.7 g/dL    Hematocrit 38.1 35.0 - 47.0 %    MCV 87 78 - 100 fL    MCH 29.8 26.5 - 33.0 pg    MCHC 34.1 31.5 - 36.5 g/dL    RDW 12.4 10.0 - 15.0 %    Platelet Count 244 150 - 450 10e3/uL    % Neutrophils 56 %    % Lymphocytes 33 %    % Monocytes 5 %    % Eosinophils 5 %    % Basophils 1 %    % Immature Granulocytes 0 %    NRBCs per 100 WBC 0 <1 /100    Absolute Neutrophils 3.6 1.6 - 8.3 10e3/uL    Absolute Lymphocytes 2.1 0.8 - 5.3 10e3/uL    Absolute Monocytes 0.4 0.0 - 1.3 10e3/uL    Absolute Eosinophils 0.3 0.0 - 0.7 10e3/uL    Absolute Basophils 0.1 0.0 - 0.2 10e3/uL    Absolute Immature Granulocytes 0.0 <=0.4 10e3/uL    Absolute NRBCs 0.0 10e3/uL   Troponin T, High Sensitivity   Result Value Ref Range    Troponin T, High Sensitivity 9 <=14 ng/L   [place] Draw    Narrative    The following orders were created for panel order [place] Draw.  Procedure                               Abnormality         Status                     ---------                               -----------         ------                     Extra Blue Top Tube[[id number]]                              Final result               Extra Red Top Tube[[id number]]                               Final result                 Please view results for these tests on the individual  orders.   Extra Blue Top Tube   Result Value Ref Range    Hold Specimen JIC    Extra Red Top Tube   Result Value Ref Range    Hold Specimen JIC    Abdomen US, limited (RUQ only)    Narrative    EXAM: US ABDOMEN LIMITED  LOCATION: [facility]  DATE/TIME: 1/12/2023 8:49 PM    INDICATION: Epigastric pain.  COMPARISON: None.  TECHNIQUE: Limited abdominal ultrasound.    FINDINGS:    GALLBLADDER: Contracted. No gallstones, wall thickening, or pericholecystic fluid. Negative sonographic Day's sign.    BILE DUCTS: No biliary dilatation. The common duct measures 2 mm.    LIVER: Normal parenchyma with smooth contour. No focal mass.    RIGHT KIDNEY: No hydronephrosis.    PANCREAS: The visualized portions are normal.    No ascites.      Impression    IMPRESSION:  1.  Normal limited abdominal ultrasound.           Medications   0.9% sodium chloride BOLUS (has no administration in time range)     Followed by   sodium chloride 0.9% infusion (has no administration in time range)   ondansetron (ZOFRAN) injection 4 mg (has no administration in time range)   HYDROmorphone (PF) (DILAUDID) injection 0.5 mg (has no administration in time range)       Assessments & Plan (with Medical Decision Making)     MDM; Muriellaurita Porras is a 62 year old female presents with epigastric pain onset last evening and is persisted all day long no prior abdominal surgery.  No constipation or diarrhea.  No vomiting but nausea.  No abdominal distention on exam.  Radiates to the right scapular region.  Does not use alcohol or tobacco.  No history of peptic ulcers.  I am suspicious for possible biliary colic.  Obtain laboratory testing and formal right upper quadrant ultrasound analgesics Zofran IV fluids    Upper quadrant ultrasound was negative.  Differential diagnosis includes gastritis/duodenitis, bowel gas/constipation.      I offered that we could continue with home management with symptomatic management as below and close  interval follow-up with primary provider versus pursuing a CT imaging test for which a few diagnoses could be diagnosed, ischemic bowel, small bowel obstruction these are unlikely based on findings and my suspicion is greater for either functional bowel or for possible gastritis.  Discussed management as below.  Precautions given for return.  I have reviewed the nursing notes.    I have reviewed the findings, diagnosis, plan and need for follow up with the patient.       Medical Decision Making  The patient presented with a problem that is an acute illness with systemic symptoms.    The patient's evaluation involved:  ordering and review of 3+ test(s) (see separate area of note for details)  strong consideration of a test (see separate area of note for details) that was ultimately deferred    The patient's management involved prescription drug management and a parenteral controlled substance.        New Prescriptions    No medications on file       Final diagnoses:   Epigastric pain - unclear cause. reassuring eval here.  avoid ibuprofen aspirin alcohol  caffeine (these can cause ulcers).  returnj immed for blood in stool or bloody vomiting.  take pepcid for the next 1-2 weeks and could consider maalox with this.  prilosec could be considered  instead if this was only partially relieved.  stay hydrated with 64 oz  fluid per day,.  this could be due to excessive gas or stool.  consider bowel regimen (see attached),  may use tylenol 650 mg orally every 6 hours scheduled.  zofran as needed for nausea.  oxycodone as needed for breakthrough pain       1/12/2023   [facility] EMERGENCY DEPT     Xavier Porter MD  01/12/23 [id number]

## 2023-01-13 NOTE — ED TRIAGE NOTES
Pt presents with upper abdominal pain that started yesterday. Pain is intermittent in severity. No relief after pepto bismol and antacids. Sometimes radiates in the back. Denies v/d, endorses minimal nausea. Denies cardiac hx.      Triage Assessment     Row Name 01/12/23 1934       Triage Assessment (Adult)    Airway WDL WDL       Respiratory WDL    Respiratory WDL WDL       Skin Circulation/Temperature WDL    Skin Circulation/Temperature WDL WDL       Cardiac WDL    Cardiac WDL WDL       Peripheral/Neurovascular WDL    Peripheral Neurovascular WDL WDL       Cognitive/Neuro/Behavioral WDL    Cognitive/Neuro/Behavioral WDL WDL

## 2023-01-13 NOTE — DISCHARGE INSTRUCTIONS
ICD-10-CM    1. Epigastric pain  R10.13     unclear cause. reassuring eval here.  avoid ibuprofen aspirin alcohol  caffeine (these can cause ulcers).  returnj immed for blood in stool or bloody vomiting.  take pepcid for the next 1-2 weeks and could consider maalox with this.  prilosec could be considered  instead if this was only partially relieved.  stay hydrated with 64 oz  fluid per day,.  this could be due to excessive gas.

## 2023-04-20 ENCOUNTER — PATIENT OUTREACH (OUTPATIENT)
Dept: CARE COORDINATION | Facility: CLINIC | Age: 63
End: 2023-04-20
Payer: COMMERCIAL

## 2023-05-04 ENCOUNTER — PATIENT OUTREACH (OUTPATIENT)
Dept: CARE COORDINATION | Facility: CLINIC | Age: 63
End: 2023-05-04
Payer: COMMERCIAL

## 2023-07-03 ENCOUNTER — OFFICE VISIT (OUTPATIENT)
Dept: ORTHOPEDICS | Facility: CLINIC | Age: 63
End: 2023-07-03
Payer: COMMERCIAL

## 2023-07-03 ENCOUNTER — ANCILLARY PROCEDURE (OUTPATIENT)
Dept: GENERAL RADIOLOGY | Facility: CLINIC | Age: 63
End: 2023-07-03
Attending: PEDIATRICS
Payer: COMMERCIAL

## 2023-07-03 VITALS
HEART RATE: 76 BPM | SYSTOLIC BLOOD PRESSURE: 115 MMHG | BODY MASS INDEX: 34.39 KG/M2 | DIASTOLIC BLOOD PRESSURE: 70 MMHG | WEIGHT: 188 LBS

## 2023-07-03 DIAGNOSIS — S99.922A FOOT INJURY, LEFT, INITIAL ENCOUNTER: Primary | ICD-10-CM

## 2023-07-03 DIAGNOSIS — Z98.890 S/P FOOT SURGERY: ICD-10-CM

## 2023-07-03 DIAGNOSIS — S99.922A FOOT INJURY, LEFT, INITIAL ENCOUNTER: ICD-10-CM

## 2023-07-03 PROCEDURE — 73630 X-RAY EXAM OF FOOT: CPT | Mod: TC | Performed by: RADIOLOGY

## 2023-07-03 PROCEDURE — 99204 OFFICE O/P NEW MOD 45 MIN: CPT | Performed by: PEDIATRICS

## 2023-07-03 ASSESSMENT — PAIN SCALES - GENERAL: PAINLEVEL: SEVERE PAIN (7)

## 2023-07-03 NOTE — LETTER
7/3/2023         RE: Muriel Porras  [address]  [place] [address]        Dear Colleague,    Thank you for referring your patient, Muriel Porras, to the [facility]. Please see a copy of my visit note below.    ASSESSMENT & PLAN    Muriel was seen today for pain and injury.    Diagnoses and all orders for this visit:    Foot injury, left, initial encounter  -     XR Foot Left G/E 3 Views; Future  -     Orthopedic  Referral; Future    S/P foot surgery  -     Orthopedic  Referral; Future      This issue is acute and Unchanged.    We discussed these other possible diagnosis: Left foot injury in the setting of prior surgery. No concerning fractures on today's x-rays. Given tenderness recommend walking boot immobilization and close follow up with Podiatry.    Plan:  - Today's Plan of Care:  Walking Boot Immobilization  Discussed activity considerations and other supportive care including Ice/Heat, OTC and other topical medications as needed.    Follow Up: with Podiatry 7-10 days - referral placed    Concerning signs and symptoms were reviewed and all questions were answered at this time.    Fariha Victor MD [facility]  Sports Medicine Physician  [facility]      -----  Chief Complaint   Patient presents with     Left Foot - Pain, Injury       SUBJECTIVE  Muriel Porras is a/an 63 year old female who is seen as a WALK IN patient for evaluation of left foot injury.    The patient is seen by themselves.  The patient is Right handed    Onset: 1 day(s) ago, last night patient describes injury as stepped in hole, unsure how it twisted.  Location of Pain: left foot midfoot, more lateral  Worsened by: weight bearing  Better with: ibuprofen  Treatments tried: rest/activity avoidance, ice, elevation and ibuprofen  Associated symptoms: swelling, numbness, tingling (didn't fully return after surgery 2022) warmth, redness and feels disconnected form  other foot area    Orthopedic/Surgical history: YES - prior foot surgery, fusion  Social History/Occupation: retail phone backer      REVIEW OF SYSTEMS:  Review of Systems    OBJECTIVE:  /70   Pulse 76   Wt 85.3 kg (188 lb)   LMP 09/01/2014   BMI 34.39 kg/m     General: healthy, alert and in no distress  Skin: no suspicious lesions or rash.  CV: distal perfusion intact  Resp: normal respiratory effort without conversational dyspnea   Psych: normal mood and affect  Gait: antalgic  Neuro: Normal light sensory exam of lower extremity    Bilateral Foot and Ankle Exam:    Inspection:       edema noted throughout dorsal and lateral foot    Foot inspection:       no deformity noted    Tender:       4th and 5th metatarsals left    Non-Tender:       remainder of ankle and foot bilateral    ROM:        limited ankle dorsiflexion and plantarflexion left (some decreased ROM previously)    Strength:       ankle dorsiflexion 5/5 bilateral       plantarflexion 5/5 bilateral       inversion 5/5 bilateral       eversion 5/5 bilateral    Gait:       antalgic gait    Neurovascular:       2+ peripheral pulses bilaterally and brisk capillary refill       sensation grossly intact      RADIOLOGY:  Final results and radiologist's interpretation, available in the [facility] health record.  Images were reviewed with the patient in the office today.  My personal interpretation of the performed imaging:     3 XR views of left foot reviewed: no acute bony abnormality, scattered degenerative changes with fusion hardware in place  - will follow official read      Reviewed prior Podiatry notes  Review of the result(s) of each unique test - XR             Again, thank you for allowing me to participate in the care of your patient.        Sincerely,        Fariha Victor MD

## 2023-07-03 NOTE — PROGRESS NOTES
ASSESSMENT & PLAN    Muriel was seen today for pain and injury.    Diagnoses and all orders for this visit:    Foot injury, left, initial encounter  -     XR Foot Left G/E 3 Views; Future  -     Orthopedic  Referral; Future    S/P foot surgery  -     Orthopedic  Referral; Future      This issue is acute and Unchanged.    We discussed these other possible diagnosis: Left foot injury in the setting of prior surgery. No concerning fractures on today's x-rays. Given tenderness recommend walking boot immobilization and close follow up with Podiatry.    Plan:  - Today's Plan of Care:  Walking Boot Immobilization  Discussed activity considerations and other supportive care including Ice/Heat, OTC and other topical medications as needed.    Follow Up: with Podiatry 7-10 days - referral placed    Concerning signs and symptoms were reviewed and all questions were answered at this time.    Fariha Victor MD [facility]  Sports Medicine Physician  [facility]      -----  Chief Complaint   Patient presents with     Left Foot - Pain, Injury       SUBJECTIVE  Muriel Porras is a/an 63 year old female who is seen as a WALK IN patient for evaluation of left foot injury.    The patient is seen by themselves.  The patient is Right handed    Onset: 1 day(s) ago, last night patient describes injury as stepped in hole, unsure how it twisted.  Location of Pain: left foot midfoot, more lateral  Worsened by: weight bearing  Better with: ibuprofen  Treatments tried: rest/activity avoidance, ice, elevation and ibuprofen  Associated symptoms: swelling, numbness, tingling (didn't fully return after surgery 2022) warmth, redness and feels disconnected form other foot area    Orthopedic/Surgical history: YES - prior foot surgery, fusion  Social History/Occupation: retail phone backer      REVIEW OF SYSTEMS:  Review of Systems    OBJECTIVE:  /70   Pulse 76   Wt 85.3 kg (188 lb)   LMP 09/01/2014   BMI 34.39  kg/m     General: healthy, alert and in no distress  Skin: no suspicious lesions or rash.  CV: distal perfusion intact  Resp: normal respiratory effort without conversational dyspnea   Psych: normal mood and affect  Gait: antalgic  Neuro: Normal light sensory exam of lower extremity    Bilateral Foot and Ankle Exam:    Inspection:       edema noted throughout dorsal and lateral foot    Foot inspection:       no deformity noted    Tender:       4th and 5th metatarsals left    Non-Tender:       remainder of ankle and foot bilateral    ROM:        limited ankle dorsiflexion and plantarflexion left (some decreased ROM previously)    Strength:       ankle dorsiflexion 5/5 bilateral       plantarflexion 5/5 bilateral       inversion 5/5 bilateral       eversion 5/5 bilateral    Gait:       antalgic gait    Neurovascular:       2+ peripheral pulses bilaterally and brisk capillary refill       sensation grossly intact      RADIOLOGY:  Final results and radiologist's interpretation, available in the [facility] health record.  Images were reviewed with the patient in the office today.  My personal interpretation of the performed imaging:     3 XR views of left foot reviewed: no acute bony abnormality, scattered degenerative changes with fusion hardware in place  - will follow official read      Reviewed prior Podiatry notes  Review of the result(s) of each unique test - XR

## 2023-07-03 NOTE — PATIENT INSTRUCTIONS
We discussed these other possible diagnosis: Left foot injury in the setting of prior surgery. No concerning fractures on today's x-rays. Given tenderness recommend walking boot immobilization and close follow up with Podiatry.    Plan:  - Today's Plan of Care:  Walking Boot Immobilization  Discussed activity considerations and other supportive care including Ice/Heat, OTC and other topical medications as needed.    Follow Up: with Podiatry 7-10 days - referral placed    If you have any further questions for your physician or physician s care team you can call [phone] and use option 3 to leave a voice message.

## 2023-07-06 ENCOUNTER — OFFICE VISIT (OUTPATIENT)
Dept: PODIATRY | Facility: CLINIC | Age: 63
End: 2023-07-06
Attending: PEDIATRICS
Payer: COMMERCIAL

## 2023-07-06 VITALS
SYSTOLIC BLOOD PRESSURE: 151 MMHG | BODY MASS INDEX: 34.6 KG/M2 | HEART RATE: 62 BPM | HEIGHT: 62 IN | DIASTOLIC BLOOD PRESSURE: 79 MMHG | WEIGHT: 188 LBS

## 2023-07-06 DIAGNOSIS — S99.922A FOOT INJURY, LEFT, INITIAL ENCOUNTER: ICD-10-CM

## 2023-07-06 DIAGNOSIS — M20.42 ACQUIRED HAMMER TOE DEFORMITY OF LESSER TOE OF LEFT FOOT: ICD-10-CM

## 2023-07-06 DIAGNOSIS — M77.52 CAPSULITIS OF METATARSOPHALANGEAL (MTP) JOINT OF LEFT FOOT: Primary | ICD-10-CM

## 2023-07-06 DIAGNOSIS — Z98.890 S/P FOOT SURGERY: ICD-10-CM

## 2023-07-06 PROCEDURE — 99213 OFFICE O/P EST LOW 20 MIN: CPT | Performed by: PODIATRIST

## 2023-07-06 RX ORDER — IBUPROFEN 200 MG
CAPSULE ORAL DAILY
COMMUNITY

## 2023-07-06 ASSESSMENT — PAIN SCALES - GENERAL: PAINLEVEL: EXTREME PAIN (9)

## 2023-07-06 NOTE — PROGRESS NOTES
PATIENT HISTORY:  Muriel Porras is a 63 year old female who presents to clinic in consultation at the request of  Fariha Victor M.D. with a chief complaint of left foot injury and past surgery.  The patient is seen by themselves.  The patient relates the pain is primarily located around the across the top behind the toes.  Patient describes injury as fell in a hole in her yard. She also had a surgery in 2020 and foot has not been the same since.  The patient relates that the symptoms have been going on for several day(s).  The patient has previously tried different shoes, elevate, ice, air boot, and ankle brace with little relief.  The patient is currently employed as telephone banker.  Any previous notes and studies that pertain to the patient's condition were reviewed.    Pertinent medical, surgical and family history was reviewed in the Epic chart.    Past Medical History:   Past Medical History:   Diagnosis Date     Arthritis 25 YEARS AGO?     Hypothyroidism (acquired)        Medications:   Current Outpatient Medications:      calcium carbonate 500 mg, elemental, (OSCAL 500) 1250 (500 Ca) MG TABS tablet, Take by mouth daily, Disp: , Rfl:      Cholecalciferol (VITAMIN D3) 125 MCG (5000 UT) CHEW, , Disp: , Rfl:      levothyroxine (SYNTHROID/LEVOTHROID) 200 MCG tablet, Take 1 tablet (200 mcg) by mouth every morning - dose increase 5/18/22, Disp: 90 tablet, Rfl: 0     ondansetron (ZOFRAN ODT) 4 MG ODT tab, Take 1 tablet (4 mg) by mouth every 8 hours as needed for nausea (Patient not taking: Reported on 7/6/2023), Disp: 10 tablet, Rfl: 0     oxyCODONE (ROXICODONE) 5 MG tablet, Take 1 tablet (5 mg) by mouth every 6 hours as needed for severe pain (7-10) (Patient not taking: Reported on 7/6/2023), Disp: 6 tablet, Rfl: 0    Current Facility-Administered Medications:      bupivacaine (MARCAINE) 0.25 % injection 4 mL, 4 mL, , , Marcelo Eubanks MD, 4 mL at 07/21/22 1444     methylPREDNISolone (DEPO-MEDROL) injection  "80 mg, 80 mg, , , Marcelo Eubanks MD, 80 mg at 07/21/22 1444     Allergies:    Allergies   Allergen Reactions     Aspirin      Mouth lesions      Tylenol [Acetaminophen] Nausea and Vomiting       Vitals: BP (!) 151/79   Pulse 62   Ht 1.575 m (5' 2\")   Wt 85.3 kg (188 lb)   LMP 09/01/2014   BMI 34.39 kg/m    BMI= Body mass index is 34.39 kg/m .    LOWER EXTREMITY PHYSICAL EXAM    Dermatologic: Skin is intact to left lower extremity without significant lesions, rash or abrasion.        Vascular: DP & PT pulses are intact & regular on the left.   CFT and skin temperature is normal to the left lower extremity.  Noted varicosities to the left foot and ankle.     Neurologic: Lower extremity sensation is intact to light touch.  No evidence of weakness in the left lower extremity.        Musculoskeletal: Patient is ambulatory without assistive device or brace.  No gross ankle deformity noted.  No foot or ankle joint effusion is noted.  Noted pain on palpation under the central metatarsophalangeal joints on the left foot.  No significant erythema or edema noted.    Diagnostics:  Recent radiographs included three views of the left foot demonstrating fusion of the talonavicular joint with hardware intact.  No cortical erosions or periosteal elevation.  All joint margins appear stable.  There is no apparent fracture or tumor formation noted.  Noted lesser toe hammertoe deformity formations.  The images were independently reviewed by myself along with the patient explaining the findings.      ASSESSMENT / PLAN:     ICD-10-CM    1. Capsulitis of metatarsophalangeal (MTP) joint of left foot  M77.52       2. Foot injury, left, initial encounter  S99.922A Orthopedic  Referral      3. S/P foot surgery  Z98.890 Orthopedic  Referral      4. Acquired hammer toe deformity of lesser toe of left foot  M20.42           I have explained to Muriel about the conditions.  We discussed the underlying contributing factors " to the condition as well as both conservative and surgical treatment options along with expected length of recovery.  At this time, the patient was educated on the importance of offloading supportive shoes and other devices.  I demonstrated to the patient calf stretches to perform every hour daily until symptoms resolve.  After symptoms resolve, the patient was advised to perform the stretches 3 times daily to prevent future recurrence.  The patient was instructed to perform warm soaks with Epson salt after which to also apply over-the-counter Voltaren gel to deeply massage the injured tissue.  The patient was instructed to do this on a daily basis until symptoms resolve.  The patient was recommended to purchase a carbonfiber insert that will aid in offloading the tension forces to the soft tissues and prevent further inflammation.   The patient may return in four weeks for reevaluation to determine if any further treatment will be needed.    Muriel verbalized agreement with and understanding of the rational for the diagnosis and treatment plan.  All questions were answered to best of my ability and the patient's satisfaction. The patient was advised to contact the clinic with any questions that may arise after the clinic visit.      Disclaimer: This note consists of symbols derived from keyboarding, dictation and/or voice recognition software. As a result, there may be errors in the script that have gone undetected. Please consider this when interpreting information found in this chart.       PARKER Tran D.P.M., F.A.C.F.A.S.

## 2023-07-06 NOTE — LETTER
7/6/2023         RE: Muriel Porras  [address]  [place] [address]        Dear Colleague,    Thank you for referring your patient, Muriel Porras, to the [facility]. Please see a copy of my visit note below.    PATIENT HISTORY:  Muriel Porras is a 63 year old female who presents to clinic in consultation at the request of  Fariha Victor M.D. with a chief complaint of left foot injury and past surgery.  The patient is seen by themselves.  The patient relates the pain is primarily located around the across the top behind the toes.  Patient describes injury as fell in a hole in her yard. She also had a surgery in 2020 and foot has not been the same since.  The patient relates that the symptoms have been going on for several day(s).  The patient has previously tried different shoes, elevate, ice, air boot, and ankle brace with little relief.  The patient is currently employed as telephone banker.  Any previous notes and studies that pertain to the patient's condition were reviewed.    Pertinent medical, surgical and family history was reviewed in the [facility] chart.    Past Medical History:   Past Medical History:   Diagnosis Date     Arthritis 25 YEARS AGO?     Hypothyroidism (acquired)        Medications:   Current Outpatient Medications:      calcium carbonate 500 mg, elemental, (OSCAL 500) 1250 (500 Ca) MG TABS tablet, Take by mouth daily, Disp: , Rfl:      Cholecalciferol (VITAMIN D3) 125 MCG (5000 UT) CHEW, , Disp: , Rfl:      levothyroxine (SYNTHROID/LEVOTHROID) 200 MCG tablet, Take 1 tablet (200 mcg) by mouth every morning - dose increase 5/18/22, Disp: 90 tablet, Rfl: 0     ondansetron (ZOFRAN ODT) 4 MG ODT tab, Take 1 tablet (4 mg) by mouth every 8 hours as needed for nausea (Patient not taking: Reported on 7/6/2023), Disp: 10 tablet, Rfl: 0     oxyCODONE (ROXICODONE) 5 MG tablet, Take 1 tablet (5 mg) by mouth every 6 hours as needed for severe pain (7-10)  "(Patient not taking: Reported on 7/6/2023), Disp: 6 tablet, Rfl: 0    Current Facility-Administered Medications:      bupivacaine (MARCAINE) 0.25 % injection 4 mL, 4 mL, , , Marcelo Eubanks MD, 4 mL at 07/21/22 1444     methylPREDNISolone (DEPO-MEDROL) injection 80 mg, 80 mg, , , Marcelo Eubanks MD, 80 mg at 07/21/22 1444     Allergies:    Allergies   Allergen Reactions     Aspirin      Mouth lesions      Tylenol [Acetaminophen] Nausea and Vomiting       Vitals: BP (!) 151/79   Pulse 62   Ht 1.575 m (5' 2\")   Wt 85.3 kg (188 lb)   LMP 09/01/2014   BMI 34.39 kg/m    BMI= Body mass index is 34.39 kg/m .    LOWER EXTREMITY PHYSICAL EXAM    Dermatologic: Skin is intact to left lower extremity without significant lesions, rash or abrasion.        Vascular: DP & PT pulses are intact & regular on the left.   CFT and skin temperature is normal to the left lower extremity.  Noted varicosities to the left foot and ankle.     Neurologic: Lower extremity sensation is intact to light touch.  No evidence of weakness in the left lower extremity.        Musculoskeletal: Patient is ambulatory without assistive device or brace.  No gross ankle deformity noted.  No foot or ankle joint effusion is noted.  Noted pain on palpation under the central metatarsophalangeal joints on the left foot.  No significant erythema or edema noted.    Diagnostics:  Recent radiographs included three views of the left foot demonstrating fusion of the talonavicular joint with hardware intact.  No cortical erosions or periosteal elevation.  All joint margins appear stable.  There is no apparent fracture or tumor formation noted.  Noted lesser toe hammertoe deformity formations.  The images were independently reviewed by myself along with the patient explaining the findings.      ASSESSMENT / PLAN:     ICD-10-CM    1. Capsulitis of metatarsophalangeal (MTP) joint of left foot  M77.52       2. Foot injury, left, initial encounter  S99.922A " Orthopedic  Referral      3. S/P foot surgery  Z98.890 Orthopedic  Referral      4. Acquired hammer toe deformity of lesser toe of left foot  M20.42           I have explained to Muriel about the conditions.  We discussed the underlying contributing factors to the condition as well as both conservative and surgical treatment options along with expected length of recovery.  At this time, the patient was educated on the importance of offloading supportive shoes and other devices.  I demonstrated to the patient calf stretches to perform every hour daily until symptoms resolve.  After symptoms resolve, the patient was advised to perform the stretches 3 times daily to prevent future recurrence.  The patient was instructed to perform warm soaks with Epson salt after which to also apply over-the-counter Voltaren gel to deeply massage the injured tissue.  The patient was instructed to do this on a daily basis until symptoms resolve.  The patient was recommended to purchase a carbonfiber insert that will aid in offloading the tension forces to the soft tissues and prevent further inflammation.   The patient may return in four weeks for reevaluation to determine if any further treatment will be needed.    Muriel verbalized agreement with and understanding of the rational for the diagnosis and treatment plan.  All questions were answered to best of my ability and the patient's satisfaction. The patient was advised to contact the clinic with any questions that may arise after the clinic visit.      Disclaimer: This note consists of symbols derived from keyboarding, dictation and/or voice recognition software. As a result, there may be errors in the script that have gone undetected. Please consider this when interpreting information found in this chart.       PARKER Tran D.P.M., F.A.C.F.A.S.      Again, thank you for allowing me to participate in the care of your patient.        Sincerely,        Ze  Kni Tran DPM

## 2023-07-06 NOTE — PATIENT INSTRUCTIONS

## 2023-07-06 NOTE — NURSING NOTE
"Chief Complaint   Patient presents with     Consult     Left foot pain- fell in a hole in her yard- July 2020 had surgery and foot has never been the same       Initial BP (!) 151/79   Pulse 62   Ht 1.575 m (5' 2\")   Wt 85.3 kg (188 lb)   LMP 09/01/2014   BMI 34.39 kg/m   Estimated body mass index is 34.39 kg/m  as calculated from the following:    Height as of this encounter: 1.575 m (5' 2\").    Weight as of this encounter: 85.3 kg (188 lb).  Medications and allergies reviewed.      Aura SANTOS MA    "

## 2023-07-29 ENCOUNTER — HEALTH MAINTENANCE LETTER (OUTPATIENT)
Age: 63
End: 2023-07-29

## 2023-12-12 ENCOUNTER — OFFICE VISIT (OUTPATIENT)
Dept: FAMILY MEDICINE | Facility: CLINIC | Age: 63
End: 2023-12-12
Payer: COMMERCIAL

## 2023-12-12 VITALS
WEIGHT: 187 LBS | HEART RATE: 52 BPM | HEIGHT: 62 IN | OXYGEN SATURATION: 99 % | DIASTOLIC BLOOD PRESSURE: 70 MMHG | SYSTOLIC BLOOD PRESSURE: 118 MMHG | TEMPERATURE: 98.2 F | RESPIRATION RATE: 16 BRPM | BODY MASS INDEX: 34.41 KG/M2

## 2023-12-12 DIAGNOSIS — Z12.31 ENCOUNTER FOR SCREENING MAMMOGRAM FOR BREAST CANCER: ICD-10-CM

## 2023-12-12 DIAGNOSIS — E03.4 HYPOTHYROIDISM DUE TO ACQUIRED ATROPHY OF THYROID: ICD-10-CM

## 2023-12-12 DIAGNOSIS — Z13.1 DIABETES MELLITUS SCREENING: ICD-10-CM

## 2023-12-12 DIAGNOSIS — Z00.00 ROUTINE GENERAL MEDICAL EXAMINATION AT A HEALTH CARE FACILITY: Primary | ICD-10-CM

## 2023-12-12 PROCEDURE — 99213 OFFICE O/P EST LOW 20 MIN: CPT | Mod: 25 | Performed by: PHYSICIAN ASSISTANT

## 2023-12-12 PROCEDURE — 99396 PREV VISIT EST AGE 40-64: CPT | Performed by: PHYSICIAN ASSISTANT

## 2023-12-12 RX ORDER — LEVOTHYROXINE SODIUM 100 UG/1
100 TABLET ORAL EVERY MORNING
COMMUNITY
Start: 2023-12-12 | End: 2023-12-29

## 2023-12-12 ASSESSMENT — ENCOUNTER SYMPTOMS
ABDOMINAL PAIN: 0
SHORTNESS OF BREATH: 0
HEMATURIA: 0
MYALGIAS: 0
EYE PAIN: 0
HEMATOCHEZIA: 0
SORE THROAT: 0
PALPITATIONS: 0
ARTHRALGIAS: 1
WEAKNESS: 0
DIARRHEA: 1
FREQUENCY: 0
DIZZINESS: 0
DYSURIA: 0
JOINT SWELLING: 1
BREAST MASS: 0
HEADACHES: 0
FEVER: 0
CONSTIPATION: 0
HEARTBURN: 0
COUGH: 0
CHILLS: 0

## 2023-12-12 NOTE — PROGRESS NOTES
SUBJECTIVE:   Muriel is a 63 year old, presenting for the following:  Physical        2023     8:06 AM   Additional Questions   Roomed by Esperanza   Accompanied by Self         2023     8:06 AM   Patient Reported Additional Medications   Patient reports taking the following new medications na       Healthy Habits:     Getting at least 3 servings of Calcium per day:  NO    Bi-annual eye exam:  Yes    Dental care twice a year:  Yes    Sleep apnea or symptoms of sleep apnea:  Daytime drowsiness    Diet:  Regular (no restrictions)    Frequency of exercise:  None    Taking medications regularly:  Yes    Medication side effects:  Not applicable    Patient with history of hypothyroidism arrived for Annual Physical, not due for PAP.     Patient not fasting for lab work.       Hypothyroidism Follow-up    Since last visit, patient describes the following symptoms: dry skin        Social History     Tobacco Use    Smoking status: Former     Packs/day: 1.00     Years: 18.00     Additional pack years: 0.00     Total pack years: 18.00     Types: Cigarettes     Quit date: 2016     Years since quittin.7    Smokeless tobacco: Never   Substance Use Topics    Alcohol use: Yes     Comment: RARELY             2023     7:57 AM   Alcohol Use   Prescreen: >3 drinks/day or >7 drinks/week? Not Applicable     Reviewed orders with patient.  Reviewed health maintenance and updated orders accordingly - Yes  Labs reviewed in EPIC    Breast Cancer Screenin/12/2022     1:15 PM 2022     7:34 AM   Breast CA Risk Assessment (FHS-7)   Do you have a family history of breast, colon, or ovarian cancer? Yes No / Unknown       Mammogram Screening: Recommended mammography every 1-2 years with patient discussion and risk factor consideration  Pertinent mammograms are reviewed under the imaging tab.    History of abnormal Pap smear: NO - age 30-65 PAP every 5 years with negative HPV co-testing recommended      Latest  "Ref Rng & Units 5/16/2022     8:19 AM 6/18/2019     7:38 AM 6/18/2019     7:31 AM   PAP / HPV   PAP  Negative for Intraepithelial Lesion or Malignancy (NILM)      PAP (Historical)    NIL    HPV 16 DNA Negative Negative  Negative     HPV 18 DNA Negative Negative  Negative     Other HR HPV Negative Negative  Negative       Reviewed and updated as needed this visit by clinical staff   Tobacco  Allergies  Meds              Reviewed and updated as needed this visit by Provider                 Past Medical History:   Diagnosis Date    Arthritis 25 YEARS AGO?    Hypothyroidism (acquired)         Review of Systems   Constitutional:  Negative for chills and fever.   HENT:  Negative for congestion, ear pain, hearing loss and sore throat.    Eyes:  Negative for pain and visual disturbance.   Respiratory:  Negative for cough and shortness of breath.    Cardiovascular:  Negative for chest pain, palpitations and peripheral edema.   Gastrointestinal:  Positive for diarrhea. Negative for abdominal pain, constipation, heartburn and hematochezia.   Breasts:  Negative for tenderness, breast mass and discharge.   Genitourinary:  Negative for dysuria, frequency, genital sores, hematuria, pelvic pain, urgency, vaginal bleeding and vaginal discharge.   Musculoskeletal:  Positive for arthralgias and joint swelling. Negative for myalgias.   Skin:  Negative for rash.   Neurological:  Negative for dizziness, weakness and headaches.   Psychiatric/Behavioral:  Negative for mood changes.         OBJECTIVE:   /70 (BP Location: Left arm, Patient Position: Chair, Cuff Size: Adult Regular)   Pulse 52   Temp 98.2  F (36.8  C) (Tympanic)   Resp 16   Ht 1.575 m (5' 2.01\")   Wt 84.8 kg (187 lb)   LMP 09/01/2014   SpO2 99%   BMI 34.19 kg/m    Physical Exam  GENERAL: healthy, alert and no distress  EYES: Eyes grossly normal to inspection  HENT: ear canals and TM's normal, nose and mouth without ulcers or lesions  NECK: no adenopathy, no " "asymmetry, masses, or scars and thyroid normal to palpation  RESP: lungs clear to auscultation - no rales, rhonchi or wheezes  BREAST: normal without masses, tenderness or nipple discharge and no palpable axillary masses or adenopathy  CV: regular rates and rhythm, normal S1 S2, no S3 or S4, and no murmur, click or rub  ABDOMEN: soft, nontender, no hepatosplenomegaly, no masses and bowel sounds normal  MS: no gross musculoskeletal defects noted, no edema  SKIN: no suspicious lesions or rashes  NEURO: Normal strength and tone, mentation intact and speech normal  PSYCH: mentation appears normal, affect normal/bright    ASSESSMENT/PLAN:       ICD-10-CM    1. Routine general medical examination at a health care facility  Z00.00 Lipid panel reflex to direct LDL Fasting      2. Diabetes mellitus screening  Z13.1 Hemoglobin A1c      3. Encounter for screening mammogram for breast cancer  Z12.31 *MA Screening Digital Bilateral      4. Hypothyroidism due to acquired atrophy of thyroid  E03.4 TSH WITH FREE T4 REFLEX     levothyroxine (SYNTHROID/LEVOTHROID) 100 MCG tablet          1-3) Screenings reviewed    4) Routine TSH ordered. She gets her medication online and is currently taking 100mcg daily. Follow up pending TSH result, if normal will continue current dose.      COUNSELING:  Reviewed preventive health counseling, as reflected in patient instructions      BMI:   Estimated body mass index is 34.19 kg/m  as calculated from the following:    Height as of this encounter: 1.575 m (5' 2.01\").    Weight as of this encounter: 84.8 kg (187 lb).       She reports that she quit smoking about 7 years ago. Her smoking use included cigarettes. She has a 18.00 pack-year smoking history. She has never used smokeless tobacco.            LAY Smith [facility] ERNESTO  "

## 2023-12-12 NOTE — PATIENT INSTRUCTIONS
Call your insurance and ask if they cover and where you can get: RSV, influenza, Covid booster, and Shingles.      Preventive Health Recommendations  Female Ages 50 - 64    Yearly exam: See your health care provider every year in order to  Review health changes.   Discuss preventive care.    Review your medicines if your doctor has prescribed any.    Get a Pap test every three years (unless you have an abnormal result and your provider advises testing more often).  If you get Pap tests with HPV test, you only need to test every 5 years, unless you have an abnormal result.   You do not need a Pap test if your uterus was removed (hysterectomy) and you have not had cancer.  You should be tested each year for STDs (sexually transmitted diseases) if you're at risk.   Have a mammogram every 1 to 2 years.  Have a colonoscopy at age 45, or have a yearly FIT test (stool test). These exams screen for colon cancer.    Have a cholesterol test every 5 years, or more often if advised.  Have a diabetes test (fasting glucose) every three years. If you are at risk for diabetes, you should have this test more often.   If you are at risk for osteoporosis (brittle bone disease), think about having a bone density scan (DEXA).    Shots: Get a flu shot each year. Get a tetanus shot every 10 years.    Nutrition:   Eat at least 5 servings of fruits and vegetables each day.  Eat whole-grain bread, whole-wheat pasta and brown rice instead of white grains and rice.  Get adequate Calcium and Vitamin D.     Lifestyle  Exercise at least 150 minutes a week (30 minutes a day, 5 days a week). This will help you control your weight and prevent disease.  Limit alcohol to one drink per day.  No smoking.   Wear sunscreen to prevent skin cancer.   See your dentist every six months for an exam and cleaning.  See your eye doctor every 1 to 2 years.

## 2023-12-18 ENCOUNTER — IMMUNIZATION (OUTPATIENT)
Dept: FAMILY MEDICINE | Facility: CLINIC | Age: 63
End: 2023-12-18
Payer: COMMERCIAL

## 2023-12-18 PROCEDURE — 90686 IIV4 VACC NO PRSV 0.5 ML IM: CPT

## 2023-12-18 PROCEDURE — 90480 ADMN SARSCOV2 VAC 1/ONLY CMP: CPT

## 2023-12-18 PROCEDURE — 90471 IMMUNIZATION ADMIN: CPT

## 2023-12-18 PROCEDURE — 91320 SARSCV2 VAC 30MCG TRS-SUC IM: CPT

## 2023-12-26 ENCOUNTER — LAB (OUTPATIENT)
Dept: LAB | Facility: CLINIC | Age: 63
End: 2023-12-26
Payer: COMMERCIAL

## 2023-12-26 DIAGNOSIS — E03.4 HYPOTHYROIDISM DUE TO ACQUIRED ATROPHY OF THYROID: ICD-10-CM

## 2023-12-26 DIAGNOSIS — Z00.00 ROUTINE GENERAL MEDICAL EXAMINATION AT A HEALTH CARE FACILITY: ICD-10-CM

## 2023-12-26 DIAGNOSIS — Z13.1 DIABETES MELLITUS SCREENING: ICD-10-CM

## 2023-12-26 LAB
CHOLEST SERPL-MCNC: 276 MG/DL
FASTING STATUS PATIENT QL REPORTED: YES
HBA1C MFR BLD: 5.3 % (ref 0–5.6)
HDLC SERPL-MCNC: 92 MG/DL
LDLC SERPL CALC-MCNC: 165 MG/DL
NONHDLC SERPL-MCNC: 184 MG/DL
T4 FREE SERPL-MCNC: 0.44 NG/DL (ref 0.9–1.7)
TRIGL SERPL-MCNC: 94 MG/DL
TSH SERPL DL<=0.005 MIU/L-ACNC: 35.29 UIU/ML (ref 0.3–4.2)

## 2023-12-26 PROCEDURE — 80061 LIPID PANEL: CPT

## 2023-12-26 PROCEDURE — 36415 COLL VENOUS BLD VENIPUNCTURE: CPT

## 2023-12-26 PROCEDURE — 83036 HEMOGLOBIN GLYCOSYLATED A1C: CPT

## 2023-12-26 PROCEDURE — 84443 ASSAY THYROID STIM HORMONE: CPT

## 2023-12-26 PROCEDURE — 84439 ASSAY OF FREE THYROXINE: CPT

## 2023-12-29 DIAGNOSIS — E03.4 HYPOTHYROIDISM DUE TO ACQUIRED ATROPHY OF THYROID: ICD-10-CM

## 2023-12-29 RX ORDER — LEVOTHYROXINE SODIUM 112 UG/1
112 TABLET ORAL EVERY MORNING
COMMUNITY
Start: 2023-12-29 | End: 2024-01-03

## 2024-01-03 ENCOUNTER — TELEPHONE (OUTPATIENT)
Dept: FAMILY MEDICINE | Facility: CLINIC | Age: 64
End: 2024-01-03
Payer: COMMERCIAL

## 2024-01-03 DIAGNOSIS — E03.4 HYPOTHYROIDISM DUE TO ACQUIRED ATROPHY OF THYROID: ICD-10-CM

## 2024-01-03 RX ORDER — LEVOTHYROXINE SODIUM 112 UG/1
112 TABLET ORAL EVERY MORNING
Qty: 90 TABLET | Refills: 0 | Status: SHIPPED | OUTPATIENT
Start: 2024-01-03

## 2024-01-03 NOTE — TELEPHONE ENCOUNTER
Please call patient with the following info:  She has not read her Mychart result note:    Your levothyroxine dose is too low. You should increase your dose to 112mcg daily and have a TSH rechecked in 2-3 months. I'll place an order for the repeat TSH.     Your A1c is normal - no signs of diabetes.  Your LDL (bad) cholesterol is higher than we would like, but not to the level where medication is needed. Work on diet, exercise and weight if possible; this can all help to improve cholesterol.      The below information is a cardiac risk score and helps determine which patients would benefit from cholesterol lowering medication. Your score doesn't indicate the need for medication yet. We consider statin therapy when the 10 year risk score is 7.5 % or higher.    The 10-year ASCVD risk score (Hina ROBERSON, et al., 2019) is: 4.1%    Values used to calculate the score:      Age: 63 years      Sex: Female      Is Non- : No      Diabetic: No      Tobacco smoker: No      Systolic Blood Pressure: 118 mmHg      Is BP treated: No      HDL Cholesterol: 92 mg/dL      Total Cholesterol: 276 mg/dL

## 2024-01-03 NOTE — TELEPHONE ENCOUNTER
"I reviewed the [employer]C [employer] message from Shruthi Osorio PA-C with patient and she verbalized a good understanding.     Patient agreed to start the levothyroxine (SYNTHROID/LEVOTHROID) 112 MCG tablet dose (pended for new prescription to be sent).     Patient states she will call back in mid-February to schedule her \"Lab Only\" visit to recheck her TSH.     Judith SMILEY  [facility]         "

## 2024-09-03 ENCOUNTER — OFFICE VISIT (OUTPATIENT)
Dept: PODIATRY | Facility: CLINIC | Age: 64
End: 2024-09-03
Payer: COMMERCIAL

## 2024-09-03 VITALS — DIASTOLIC BLOOD PRESSURE: 80 MMHG | SYSTOLIC BLOOD PRESSURE: 150 MMHG | HEART RATE: 66 BPM

## 2024-09-03 DIAGNOSIS — L84 CALLUS: ICD-10-CM

## 2024-09-03 DIAGNOSIS — M77.8 CAPSULITIS OF FOOT, LEFT: ICD-10-CM

## 2024-09-03 DIAGNOSIS — M79.672 CHRONIC FOOT PAIN, LEFT: ICD-10-CM

## 2024-09-03 DIAGNOSIS — G89.29 CHRONIC FOOT PAIN, LEFT: ICD-10-CM

## 2024-09-03 DIAGNOSIS — Z98.890 S/P FOOT SURGERY: Primary | ICD-10-CM

## 2024-09-03 PROCEDURE — 11055 PARING/CUTG B9 HYPRKER LES 1: CPT | Performed by: PODIATRIST

## 2024-09-03 PROCEDURE — 99213 OFFICE O/P EST LOW 20 MIN: CPT | Mod: 25 | Performed by: PODIATRIST

## 2024-09-03 NOTE — LETTER
September 3, 2024      Muriel Porras  [address] [facility] [address]        To Whom It May Concern:    Muriel Porras was seen in our clinic. She may return to work with the following: limited to light duty - standing limited to 4 hrs and walking limited to 4 hrs. on or about 1 year.      Sincerely,    Dr. Jose Anaya D.P.M FAC FAS/lld    (Electronically Signed)

## 2024-09-03 NOTE — PATIENT INSTRUCTIONS
We wish you continued good healing. If you have any questions or concerns, please do not hesitate to contact us at  [phone]    [email or web address] (secure e-mail communication and access to your chart) to send a message or to make an appointment.    Please remember to call and schedule a follow up appointment if one was recommended at your earliest convenience.     PODIATRY CLINIC HOURS  TELEPHONE NUMBER    Dr. Jose MUHAMMADPKARYN FACFAS        Clinics:  [facility]   [place]  Tuesday 1PM-6PM  [place]  Wednesday 745AM-330PM  [place]  Monday 2nd,4th  830AM-4PM  Thursday/Friday 745AM-230PM     [place] APPOINTMENTS  [phone]    [place] APPOINTMENTS  [phone]          If you need a medication refill, please contact us you may need lab work and/or a follow up visit prior to your refill (i.e. Antifungal medications).  If MRI needed please call Imaging at [phone]   HOW DO I GET MY KNEE SCOOTER? Knee scooters can be picked up at ANY Medical Supply stores with your knee scooter Prescription.  OR  Bring your signed prescription to an [facility] showroom.   Set up an appointment for your custom Orthotics. Call any Orthotics locations call [phone]

## 2024-09-03 NOTE — LETTER
9/3/2024      Muriel Porras  [address]  [place] [address]      Dear Colleague,    Thank you for referring your patient, Muriel Porras, to the [facility]. Please see a copy of my visit note below.    S:  Patient complains of chronic left foot pain.  Has history of flatfoot surgery in 2021.  States this did not really help and traded 1 pain for another.  Has recently been going to physical therapy as her health insurance changed and she believes this has been helping her foot pain.  Works as a .  Mostly sitting but some days she has to stand a lot.  Also has more recent pain in area of left fifth metatarsal head plantar.  Aggravated by activity and relieved by rest.  She is wondering about a note for work.  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  Denies erythema ecchymosis blistering or drainage.    ROS:  see above       Allergies   Allergen Reactions     Aspirin      Mouth lesions      Tylenol [Acetaminophen] Nausea and Vomiting       Current Outpatient Medications   Medication Sig Dispense Refill     calcium carbonate 500 mg, elemental, (OSCAL 500) 1250 (500 Ca) MG TABS tablet Take by mouth daily       Cholecalciferol (VITAMIN D3) 125 MCG (5000 UT) CHEW        levothyroxine (SYNTHROID/LEVOTHROID) 112 MCG tablet Take 1 tablet (112 mcg) by mouth every morning - dose increase 1/3/24 (due for labs in 2-3 months) 90 tablet 0       Patient Active Problem List   Diagnosis     Hypothyroidism due to acquired atrophy of thyroid     Osteopenia of both hips     Acquired flat foot, left       Past Medical History:   Diagnosis Date     Arthritis 25 YEARS AGO?     Hypothyroidism (acquired)        Past Surgical History:   Procedure Laterality Date     BREAST SURGERY  16 YEARS AGO     COLONOSCOPY  10/2019 ?     COLONOSCOPY WITH CO2 INSUFFLATION N/A 09/13/2019    Procedure: COLONOSCOPY, WITH CO2  INSUFFLATION;  Surgeon: Marshall Kaba DO;  Location:  OR     RECONSTRUCT FLAT FOOT Left 2021    Procedure: LEFT FLATFOOT RECONSTRUCTION;  Surgeon: Marko Maya DPM;  Location:  OR       Family History   Problem Relation Age of Onset     Cerebrovascular Disease Mother 84     Myocardial Infarction Father         Stents     Pacemaker Father      Hypothyroidism Father      Thyroid Disease Father      Hypothyroidism Sister      Colon Cancer Sister 65     Hypothyroidism Sister      No Known Problems Sister      No Known Problems Brother      Colon Cancer Maternal Grandfather      Colon Polyps Daughter        Social History     Tobacco Use     Smoking status: Former     Current packs/day: 0.00     Average packs/day: 1 pack/day for 18.0 years (18.0 ttl pk-yrs)     Types: Cigarettes     Start date: 1998     Quit date: 2016     Years since quittin.4     Smokeless tobacco: Never   Substance Use Topics     Alcohol use: Yes     Comment: RARELY         O:   BP (!) 150/80   Pulse 66   LMP 2014 .      Constitutional/ general:  Pt is in no apparent distress, appears well-nourished.  Cooperative with history and physical exam.     Psych:  The patient answered questions appropriately.  Normal affect.  Seems to have reasonable expectations, in terms of treatment.     Lungs:  Non labored breathing, non labored speech. No cough.  No audible wheezing. Even, quiet breathing.       Vascular:  Pedal pulses are palpable bilaterally for both the DP and PT arteries.  CFT < 3 sec.  No edema.  Pedal hair growth noted.     Neuro:  Alert and oriented x 3. Coordinated gait.  Light touch sensation is intact     Derm: Normal texture and turgor.  No erythema, ecchymosis, or cyanosis.  No open lesions.     Musculoskeletal:    Lower extremity muscle strength is normal.  Patient is ambulatory without an assistive device or brace.  Normal arch with weightbearing.  No forefoot or rear foot deformities noted.  MS 5/5  all compartments.  Decreased range of motion left midtarsal joint.  Hard to localize pain on her rear foot.  All incisions well-healed.  On her forefoot there is pressure under her fifth metatarsal head.  Fat pad atrophic.  Small callus under the fifth met head.  There are no masses.    Radiographic Exam:     Narrative & Impression   LEFT FOOT THREE OR MORE VIEWS  7/3/2023 3:05 PM      HISTORY: Foot injury, left, initial encounter.     COMPARISON: 5/19/2022.                                                                      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 Hardware is in place. Mild to  moderate degenerative changes subtalar joint. Mild first MTP joint  arthrosis. Calcaneal heel spur. No acute fracture. Second hammertoe  deformity.       A:    Status post flatfoot surgery and left chronic rear foot pain  Left subfifth capsulitis/callus    P:  X rays personally reviewed.  Reviewed past surgical notes.  Reviewed podiatry note from last year.  Recent hemoglobin A1c normal.  Patient will continue to work with physical therapy regarding chronic right foot pain.  We wrote note for work stating she should stand for only limited times during the day.  Discussed cause of subfifth capsulitis.  Pointed out and ornament pressure under fifth metatarsal head as seen on her foot bed in her shoe.  Her shoes are quite malleable.  Discussed stiff shoe inside and outside for both problems and I made suggestions.  Hopefully this will help all problems.  We debrided the callus under fifth metatarsal head.  Patient will keep this down with a pumice stone.  RTC as needed    Jose Anaya DPM, FACFAS     Again, thank you for allowing me to participate in the care of your patient.        Sincerely,        Jose Anaya DPM

## 2024-09-04 NOTE — PROGRESS NOTES
S:  Patient complains of chronic left foot pain.  Has history of flatfoot surgery in 2021.  States this did not really help and traded 1 pain for another.  Has recently been going to physical therapy as her health insurance changed and she believes this has been helping her foot pain.  Works as a .  Mostly sitting but some days she has to stand a lot.  Also has more recent pain in area of left fifth metatarsal head plantar.  Aggravated by activity and relieved by rest.  She is wondering about a note for work.  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  Denies erythema ecchymosis blistering or drainage.    ROS:  see above       Allergies   Allergen Reactions    Aspirin      Mouth lesions     Tylenol [Acetaminophen] Nausea and Vomiting       Current Outpatient Medications   Medication Sig Dispense Refill    calcium carbonate 500 mg, elemental, (OSCAL 500) 1250 (500 Ca) MG TABS tablet Take by mouth daily      Cholecalciferol (VITAMIN D3) 125 MCG (5000 UT) CHEW       levothyroxine (SYNTHROID/LEVOTHROID) 112 MCG tablet Take 1 tablet (112 mcg) by mouth every morning - dose increase 1/3/24 (due for labs in 2-3 months) 90 tablet 0       Patient Active Problem List   Diagnosis    Hypothyroidism due to acquired atrophy of thyroid    Osteopenia of both hips    Acquired flat foot, left       Past Medical History:   Diagnosis Date    Arthritis 25 YEARS AGO?    Hypothyroidism (acquired)        Past Surgical History:   Procedure Laterality Date    BREAST SURGERY  16 YEARS AGO    COLONOSCOPY  10/2019 ?    COLONOSCOPY WITH CO2 INSUFFLATION N/A 09/13/2019    Procedure: COLONOSCOPY, WITH CO2 INSUFFLATION;  Surgeon: Marshall Kaba DO;  Location: [facility] OR    RECONSTRUCT FLAT FOOT Left 07/14/2021    Procedure: LEFT FLATFOOT RECONSTRUCTION;  Surgeon: Marko Maya DPM;  Location: [facility] OR       Family History   Problem Relation Age of Onset     Cerebrovascular Disease Mother 84    Myocardial Infarction Father         Stents    Pacemaker Father     Hypothyroidism Father     Thyroid Disease Father     Hypothyroidism Sister     Colon Cancer Sister 65    Hypothyroidism Sister     No Known Problems Sister     No Known Problems Brother     Colon Cancer Maternal Grandfather     Colon Polyps Daughter        Social History     Tobacco Use    Smoking status: Former     Current packs/day: 0.00     Average packs/day: 1 pack/day for 18.0 years (18.0 ttl pk-yrs)     Types: Cigarettes     Start date: 1998     Quit date: 2016     Years since quittin.4    Smokeless tobacco: Never   Substance Use Topics    Alcohol use: Yes     Comment: RARELY         O:   BP (!) 150/80   Pulse 66   LMP 2014 .      Constitutional/ general:  Pt is in no apparent distress, appears well-nourished.  Cooperative with history and physical exam.     Psych:  The patient answered questions appropriately.  Normal affect.  Seems to have reasonable expectations, in terms of treatment.     Lungs:  Non labored breathing, non labored speech. No cough.  No audible wheezing. Even, quiet breathing.       Vascular:  Pedal pulses are palpable bilaterally for both the DP and PT arteries.  CFT < 3 sec.  No edema.  Pedal hair growth noted.     Neuro:  Alert and oriented x 3. Coordinated gait.  Light touch sensation is intact     Derm: Normal texture and turgor.  No erythema, ecchymosis, or cyanosis.  No open lesions.     Musculoskeletal:    Lower extremity muscle strength is normal.  Patient is ambulatory without an assistive device or brace.  Normal arch with weightbearing.  No forefoot or rear foot deformities noted.  MS 5/5 all compartments.  Decreased range of motion left midtarsal joint.  Hard to localize pain on her rear foot.  All incisions well-healed.  On her forefoot there is pressure under her fifth metatarsal head.  Fat pad atrophic.  Small callus under the fifth met head.  There  are no masses.    Radiographic Exam:     Narrative & Impression   LEFT FOOT THREE OR MORE VIEWS  7/3/2023 3:05 PM      HISTORY: Foot injury, left, initial encounter.     COMPARISON: 5/19/2022.                                                                      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 Hardware is in place. Mild to  moderate degenerative changes subtalar joint. Mild first MTP joint  arthrosis. Calcaneal heel spur. No acute fracture. Second hammertoe  deformity.       A:    Status post flatfoot surgery and left chronic rear foot pain  Left subfifth capsulitis/callus    P:  X rays personally reviewed.  Reviewed past surgical notes.  Reviewed podiatry note from last year.  Recent hemoglobin A1c normal.  Patient will continue to work with physical therapy regarding chronic right foot pain.  We wrote note for work stating she should stand for only limited times during the day.  Discussed cause of subfifth capsulitis.  Pointed out and ornament pressure under fifth metatarsal head as seen on her foot bed in her shoe.  Her shoes are quite malleable.  Discussed stiff shoe inside and outside for both problems and I made suggestions.  Hopefully this will help all problems.  We debrided the callus under fifth metatarsal head.  Patient will keep this down with a pumice stone.  RTC as needed    Jose Anaya DPM, FACFAS

## 2024-09-19 ENCOUNTER — NURSE TRIAGE (OUTPATIENT)
Dept: FAMILY MEDICINE | Facility: CLINIC | Age: 64
End: 2024-09-19
Payer: COMMERCIAL

## 2024-09-21 ENCOUNTER — HEALTH MAINTENANCE LETTER (OUTPATIENT)
Age: 64
End: 2024-09-21

## 2024-09-23 ENCOUNTER — OFFICE VISIT (OUTPATIENT)
Dept: FAMILY MEDICINE | Facility: CLINIC | Age: 64
End: 2024-09-23
Payer: COMMERCIAL

## 2024-09-23 ENCOUNTER — ANCILLARY PROCEDURE (OUTPATIENT)
Dept: CT IMAGING | Facility: CLINIC | Age: 64
End: 2024-09-23
Attending: PHYSICIAN ASSISTANT
Payer: COMMERCIAL

## 2024-09-23 VITALS
RESPIRATION RATE: 18 BRPM | SYSTOLIC BLOOD PRESSURE: 128 MMHG | DIASTOLIC BLOOD PRESSURE: 76 MMHG | HEART RATE: 74 BPM | BODY MASS INDEX: 32.61 KG/M2 | WEIGHT: 177.2 LBS | TEMPERATURE: 98.6 F | HEIGHT: 62 IN | OXYGEN SATURATION: 98 %

## 2024-09-23 DIAGNOSIS — E03.4 HYPOTHYROIDISM DUE TO ACQUIRED ATROPHY OF THYROID: ICD-10-CM

## 2024-09-23 DIAGNOSIS — R19.7 DIARRHEA, UNSPECIFIED TYPE: ICD-10-CM

## 2024-09-23 DIAGNOSIS — R50.9 FEVER, UNSPECIFIED FEVER CAUSE: ICD-10-CM

## 2024-09-23 DIAGNOSIS — R10.13 EPIGASTRIC PAIN: ICD-10-CM

## 2024-09-23 DIAGNOSIS — R10.13 EPIGASTRIC PAIN: Primary | ICD-10-CM

## 2024-09-23 DIAGNOSIS — R14.0 BLOATING: ICD-10-CM

## 2024-09-23 LAB
ALBUMIN SERPL BCG-MCNC: 4.3 G/DL (ref 3.5–5.2)
ALBUMIN UR-MCNC: NEGATIVE MG/DL
ALP SERPL-CCNC: 54 U/L (ref 40–150)
ALT SERPL W P-5'-P-CCNC: 15 U/L (ref 0–50)
ANION GAP SERPL CALCULATED.3IONS-SCNC: 9 MMOL/L (ref 7–15)
APPEARANCE UR: CLEAR
AST SERPL W P-5'-P-CCNC: 28 U/L (ref 0–45)
BACTERIA #/AREA URNS HPF: ABNORMAL /HPF
BILIRUB SERPL-MCNC: 0.4 MG/DL
BILIRUB UR QL STRIP: NEGATIVE
BUN SERPL-MCNC: 13.9 MG/DL (ref 8–23)
CALCIUM SERPL-MCNC: 9.4 MG/DL (ref 8.8–10.4)
CHLORIDE SERPL-SCNC: 105 MMOL/L (ref 98–107)
COLOR UR AUTO: YELLOW
CREAT SERPL-MCNC: 0.75 MG/DL (ref 0.51–0.95)
CRP SERPL-MCNC: 3.93 MG/L
EGFRCR SERPLBLD CKD-EPI 2021: 88 ML/MIN/1.73M2
ERYTHROCYTE [DISTWIDTH] IN BLOOD BY AUTOMATED COUNT: 12.2 % (ref 10–15)
ERYTHROCYTE [SEDIMENTATION RATE] IN BLOOD BY WESTERGREN METHOD: 17 MM/HR (ref 0–30)
GLUCOSE SERPL-MCNC: 88 MG/DL (ref 70–99)
GLUCOSE UR STRIP-MCNC: NEGATIVE MG/DL
HCO3 SERPL-SCNC: 27 MMOL/L (ref 22–29)
HCT VFR BLD AUTO: 38.3 % (ref 35–47)
HGB BLD-MCNC: 13.1 G/DL (ref 11.7–15.7)
HGB UR QL STRIP: NEGATIVE
KETONES UR STRIP-MCNC: NEGATIVE MG/DL
LEUKOCYTE ESTERASE UR QL STRIP: ABNORMAL
MCH RBC QN AUTO: 30.5 PG (ref 26.5–33)
MCHC RBC AUTO-ENTMCNC: 34.2 G/DL (ref 31.5–36.5)
MCV RBC AUTO: 89 FL (ref 78–100)
NITRATE UR QL: NEGATIVE
PH UR STRIP: 7 [PH] (ref 5–7)
PLATELET # BLD AUTO: 222 10E3/UL (ref 150–450)
POTASSIUM SERPL-SCNC: 4.2 MMOL/L (ref 3.4–5.3)
PROT SERPL-MCNC: 7.1 G/DL (ref 6.4–8.3)
RBC # BLD AUTO: 4.3 10E6/UL (ref 3.8–5.2)
RBC #/AREA URNS AUTO: ABNORMAL /HPF
SODIUM SERPL-SCNC: 141 MMOL/L (ref 135–145)
SP GR UR STRIP: 1.01 (ref 1–1.03)
SQUAMOUS #/AREA URNS AUTO: ABNORMAL /LPF
UROBILINOGEN UR STRIP-ACNC: 0.2 E.U./DL
WBC # BLD AUTO: 5 10E3/UL (ref 4–11)
WBC #/AREA URNS AUTO: ABNORMAL /HPF

## 2024-09-23 PROCEDURE — 36415 COLL VENOUS BLD VENIPUNCTURE: CPT | Performed by: PHYSICIAN ASSISTANT

## 2024-09-23 PROCEDURE — 85027 COMPLETE CBC AUTOMATED: CPT | Performed by: PHYSICIAN ASSISTANT

## 2024-09-23 PROCEDURE — 80053 COMPREHEN METABOLIC PANEL: CPT | Performed by: PHYSICIAN ASSISTANT

## 2024-09-23 PROCEDURE — 74177 CT ABD & PELVIS W/CONTRAST: CPT | Mod: TC | Performed by: RADIOLOGY

## 2024-09-23 PROCEDURE — 81001 URINALYSIS AUTO W/SCOPE: CPT | Performed by: PHYSICIAN ASSISTANT

## 2024-09-23 PROCEDURE — 99214 OFFICE O/P EST MOD 30 MIN: CPT | Performed by: PHYSICIAN ASSISTANT

## 2024-09-23 PROCEDURE — 86140 C-REACTIVE PROTEIN: CPT | Performed by: PHYSICIAN ASSISTANT

## 2024-09-23 PROCEDURE — 85652 RBC SED RATE AUTOMATED: CPT | Performed by: PHYSICIAN ASSISTANT

## 2024-09-23 RX ORDER — IOPAMIDOL 755 MG/ML
108 INJECTION, SOLUTION INTRAVASCULAR ONCE
Status: COMPLETED | OUTPATIENT
Start: 2024-09-23 | End: 2024-09-23

## 2024-09-23 RX ADMIN — IOPAMIDOL 108 ML: 755 INJECTION, SOLUTION INTRAVASCULAR at 11:00

## 2024-09-23 ASSESSMENT — ENCOUNTER SYMPTOMS
ANAL BLEEDING: 0
LIGHT-HEADEDNESS: 0
SHORTNESS OF BREATH: 0
DIAPHORESIS: 0
ABDOMINAL PAIN: 1
MYALGIAS: 0
NAUSEA: 1
BLOOD IN STOOL: 0
DIARRHEA: 1
FEVER: 1
ABDOMINAL DISTENTION: 1
FATIGUE: 0
CHILLS: 0
VOMITING: 0

## 2024-09-23 ASSESSMENT — PAIN SCALES - GENERAL: PAINLEVEL: NO PAIN (0)

## 2024-09-23 NOTE — PATIENT INSTRUCTIONS
For further evaluation of your symptoms we are completing lab work as well as a CT scan.  We will send results via [employer] once available.  We will call you if there are any severe results.      Please see the attached handout for additional information and when to follow-up emergently.  Reach out with questions or concerns.

## 2024-09-23 NOTE — PROGRESS NOTES
Assessment & Plan     Epigastric pain  Bloating  Diarrhea, unspecified type  Fever, unspecified fever cause  Patient is a 64-year-old female who presents to clinic due to  6-8 weeks of epigastric pain, bloating, intermittent fevers, and low back pain that started 1.5 weeks ago.  Vital signs normal.  Physical exam significant for epigastric tenderness to palpation.  Differential diagnosis includes, but is not limited to, Crohn's, colitis, IBS, diverticulitis, gastritis, GERD.  Will complete stat CT as well as labs for further evaluation.  Urgent follow precautions as provided.  - CBC with platelets; Future  - Comprehensive metabolic panel (BMP + Alb, Alk Phos, ALT, AST, Total. Bili, TP); Future  - UA Macroscopic with reflex to Microscopic and Culture - Lab Collect; Future  - ESR: Erythrocyte sedimentation rate; Future  - CRP, inflammation; Future  - CT Abdomen Pelvis w Contrast; Future        See Patient Instructions    Subjective   Muriel is a 64 year old, presenting for the following health issues:  Abdominal Pain and Diarrhea      9/23/2024     9:10 AM   Additional Questions   Roomed by Lou CAMARA MA   Accompanied by No one         9/23/2024     9:10 AM   Patient Reported Additional Medications   Patient reports taking the following new medications None     History of Present Illness       Reason for visit:  Abdominal pain w/back pain  Symptom onset:  More than a month  Symptoms include:  Abdominal pain w/back pain  Symptom intensity:  Mild  Symptom progression:  Staying the same  Had these symptoms before:  No  What makes it worse:  Sometimes hurts more after eating  What makes it better:  Not necessarily   She is taking medications regularly.       6-8 weeks of epigastric discomfort  Diarrhea started around the same time, no black/bloody stools, and stools occur 2-3 times per day  Right low back pain that started 1.5 weeks ago and is worsening   Intermittent low grade fever and nausea as well as full  "feeling/bloating  No history of IBS, IBD, Celiac  No urinary or vaginal symptoms  No history of abdominal surgery   No out of country travel, antibiotic use, hospitalizations  No acid reflux history   Sister recently  from colon cancer       Colonoscopy 2109:  Impression:               - Hemorrhoids found on perianal exam.                             - The entire examined colon is normal.                             - No specimens collected.     Review of Systems   Constitutional:  Positive for fever. Negative for chills, diaphoresis and fatigue.   Respiratory:  Negative for shortness of breath.    Cardiovascular:  Negative for chest pain.   Gastrointestinal:  Positive for abdominal distention, abdominal pain, diarrhea and nausea. Negative for anal bleeding, blood in stool and vomiting.   Musculoskeletal:  Negative for myalgias.   Neurological:  Negative for light-headedness.           Objective    /76   Pulse 74   Temp 98.6  F (37  C) (Temporal)   Resp 18   Ht 1.575 m (5' 2\")   Wt 80.4 kg (177 lb 3.2 oz)   LMP 2014 (Exact Date)   SpO2 98%   Breastfeeding No   BMI 32.41 kg/m    Body mass index is 32.41 kg/m .  Physical Exam  Vitals and nursing note reviewed.   Constitutional:       General: She is not in acute distress.     Appearance: Normal appearance.   HENT:      Head: Normocephalic and atraumatic.      Mouth/Throat:      Mouth: Mucous membranes are moist.      Pharynx: Oropharynx is clear.   Eyes:      Extraocular Movements: Extraocular movements intact.      Pupils: Pupils are equal, round, and reactive to light.   Cardiovascular:      Rate and Rhythm: Normal rate and regular rhythm.      Heart sounds: Normal heart sounds.   Pulmonary:      Effort: Pulmonary effort is normal.      Breath sounds: Normal breath sounds.   Abdominal:      General: Bowel sounds are normal.      Palpations: Abdomen is soft.      Tenderness: There is abdominal tenderness (epigastric). There is no right " CVA tenderness, left CVA tenderness, guarding or rebound.   Musculoskeletal:         General: Normal range of motion.      Cervical back: Normal range of motion.   Skin:     General: Skin is warm and dry.   Neurological:      General: No focal deficit present.      Mental Status: She is alert.   Psychiatric:         Mood and Affect: Mood normal.         Behavior: Behavior normal.                    Signed Electronically by: Radha Moore PA-C

## 2024-10-23 DIAGNOSIS — R10.13 EPIGASTRIC PAIN: ICD-10-CM

## 2024-10-23 NOTE — TELEPHONE ENCOUNTER
Medication Question or Refill    Contacts       Contact Date/Time Type Contact Phone/Fax    10/23/2024 04:11 PM CDT Phone (Incoming) Muriel Porras (Self) [phone] (M)            What medication are you calling about (include dose and sig)?: omeprazole (PRILOSEC) 20 MG DR capsule     Preferred Pharmacy:   [facility] [address] [place] [address] [id number]  [address] [place] [address]  Phone: [phone] Fax: [phone]    [address] [address]        Controlled Substance Agreement on file:   CSA -- Patient Level:    CSA: None found at the patient level.       Who prescribed the medication?: Oscar    Do you need a refill? Yes    When did you use the medication last? This morning     Patient offered an appointment? Yes: pt has appt with PCP 11/6/24    Do you have any questions or concerns?  Yes: pt requesting additional refill until her appt      Could we send this information to you in [facility] or would you prefer to receive a phone call?:   Patient would prefer a phone call   Okay to leave a detailed message?: Yes at Cell number on file:    Telephone Information:   Mobile [phone]

## 2024-11-06 ENCOUNTER — OFFICE VISIT (OUTPATIENT)
Dept: FAMILY MEDICINE | Facility: CLINIC | Age: 64
End: 2024-11-06
Payer: COMMERCIAL

## 2024-11-06 VITALS
SYSTOLIC BLOOD PRESSURE: 122 MMHG | HEART RATE: 68 BPM | DIASTOLIC BLOOD PRESSURE: 74 MMHG | HEIGHT: 62 IN | OXYGEN SATURATION: 99 % | WEIGHT: 182.8 LBS | RESPIRATION RATE: 20 BRPM | TEMPERATURE: 97.7 F | BODY MASS INDEX: 33.64 KG/M2

## 2024-11-06 DIAGNOSIS — R19.7 DIARRHEA, UNSPECIFIED TYPE: Primary | ICD-10-CM

## 2024-11-06 DIAGNOSIS — R10.13 EPIGASTRIC PAIN: ICD-10-CM

## 2024-11-06 PROCEDURE — 99213 OFFICE O/P EST LOW 20 MIN: CPT | Performed by: PHYSICIAN ASSISTANT

## 2024-11-06 NOTE — PROGRESS NOTES
"  Assessment & Plan       ICD-10-CM    1. Diarrhea, unspecified type  R19.7 Helicobacter pylori Antigen Stool     Enteric Bacteria and Virus Panel PCR     C. difficile Toxin B PCR with reflex to C. difficile Antigen and Toxins A/B EIA     Helicobacter pylori Antigen Stool     Enteric Bacteria and Virus Panel PCR     C. difficile Toxin B PCR with reflex to C. difficile Antigen and Toxins A/B EIA      2. Epigastric pain  R10.13 omeprazole (PRILOSEC) 20 MG DR capsule          1,2) Will obtain stool studies. If negative, will likely order EGD and colonoscopy. Possible GI referral down the road if diagnostics negative.       BMI  Estimated body mass index is 33.43 kg/m  as calculated from the following:    Height as of this encounter: 1.575 m (5' 2\").    Weight as of this encounter: 82.9 kg (182 lb 12.8 oz).     I was present with the student who participated in the service and in the documentation of the note. I have verified the history and personally performed the physical exam and medical decision making.   Shruthi Osorio PA-C    Return for follow up pending lab and/or imaging results.       Christel Llamas is a 64 year old, presenting for the following health issues:  Abdominal Pain and Diarrhea        11/6/2024     6:53 AM   Additional Questions   Roomed by Rocio   Accompanied by yuridia         11/6/2024     6:53 AM   Patient Reported Additional Medications   Patient reports taking the following new medications none     History of Present Illness       Reason for visit:  Stomach / diarrhea  Symptom onset:  More than a month  Symptoms include:  Cramping,  Symptom intensity:  Severe  Symptom progression:  Worsening    She eats 2-3 servings of fruits and vegetables daily.She consumes 0 sweetened beverage(s) daily.She exercises with enough effort to increase her heart rate 9 or less minutes per day.  She exercises with enough effort to increase her heart rate 3 or less days per week.   She is taking medications " "regularly.     States she has periods where it is improved but then has worse periods  - during these times she has 2-3 bowel movements throughout the day  - when it is better it is less frequent and not as bad of cramping  - still remains runny and has not had a solid stool for 3 months  - has flares that get worse for about 4-5 days     Has had 4 episodes in the last 3 hours.  States it's usually worse in the morning and then not as bad the rest of the day.     States that she had tried to have salmon in September and had \"violent diarrhea\" after that     No notable changes with foods, no fevers or chills, no bloody stools,     Since starting the omeprazole she hasn't been having the pain anymore, but the cramping and diarrhea have persisted      Diarrhea  Onset/Duration: 3 months ago with abdominal pain  Description:       Consistency of stool: watery, runny, loose, pasty, explosive, and changes color       Blood in stool: No       Number of loose stools past 24 hours: 5-6  Progression of Symptoms: worsening  Accompanying signs and symptoms:       Fever: No       Nausea/Vomiting: No       Abdominal pain: YES- cramps       Weight loss: No       Episodes of constipation: No  History   Ill contacts: No  Recent use of antibiotics: No  Recent travels: No  Recent medication-new or changes(Rx or OTC): No  Precipitating or alleviating factors: unknown  Therapies tried and outcome: Imodium AD and PeptoBismol not helped        Review of Systems  Constitutional, gi and gu systems are negative, except as otherwise noted.      Objective    /74   Pulse 68   Temp 97.7  F (36.5  C) (Oral)   Resp 20   Ht 1.575 m (5' 2\")   Wt 82.9 kg (182 lb 12.8 oz)   LMP 09/01/2014 (Exact Date)   SpO2 99%   BMI 33.43 kg/m    Body mass index is 33.43 kg/m .  Physical Exam   GENERAL: alert and no distress  ABDOMEN: soft, nontender, without hepatosplenomegaly or masses and bowel sounds normal  MS: no gross musculoskeletal defects " noted, no edema  SKIN: no suspicious lesions or rashes  NEURO: Normal strength and tone, mentation intact and speech normal  PSYCH: mentation appears normal, affect normal/bright          Signed Electronically by: Shruthi Osorio PA-C

## 2024-11-07 LAB

## 2024-11-07 PROCEDURE — 87493 C DIFF AMPLIFIED PROBE: CPT | Mod: 59 | Performed by: PHYSICIAN ASSISTANT

## 2024-11-07 PROCEDURE — 87507 IADNA-DNA/RNA PROBE TQ 12-25: CPT | Performed by: PHYSICIAN ASSISTANT

## 2024-11-07 PROCEDURE — 87338 HPYLORI STOOL AG IA: CPT | Performed by: PHYSICIAN ASSISTANT

## 2024-11-08 LAB — H PYLORI AG STL QL IA: NEGATIVE

## 2024-11-21 ENCOUNTER — TELEPHONE (OUTPATIENT)
Dept: GASTROENTEROLOGY | Facility: CLINIC | Age: 64
End: 2024-11-21
Payer: COMMERCIAL

## 2024-11-21 NOTE — TELEPHONE ENCOUNTER
Pre assessment completed for upcoming procedure.   (Please see previous telephone encounter notes for complete details)      Procedure details:    Arrival time and facility location reviewed.    Pre op exam needed? No.    Designated  policy reviewed. Instructed to have someone stay 6  hours post procedure.       Medication review:    Medications reviewed. Please see supporting documentation below. Holding recommendations discussed (if applicable).       Prep for procedure:     Procedure prep instructions reviewed.        Any additional information needed:  N/A      Patient  verbalized understanding and had no questions or concerns at this time.      Radha Laughlin RN  Endoscopy Procedure Pre Assessment   [phone] option 2

## 2024-11-21 NOTE — TELEPHONE ENCOUNTER
Pre visit planning completed.      Procedure details:    Patient scheduled for Colonoscopy on 12/3/2024.     Arrival time: 1240. Procedure time 1325    Facility location: [facility]; [address], [place] [address]. Check in location: 2nd Floor at Surgery desk.    Sedation type: Conscious sedation     Pre op exam needed? No.    Indication for procedure:   Diagnosis   Diarrhea, unspecified type         Chart review:     Electronic implanted devices? No    Recent diagnosis of diverticulitis within the last 6 weeks? No      Medication review:    Diabetic? No    Anticoagulants? No    Weight loss medication/injectable? No GLP-1 medication per patient's medication list. Nursing to verify with pre-assessment call.    Other medication HOLDING recommendations:  N/A      Prep for procedure:     Bowel prep recommendation: Standard Miralax  Due to: standard bowel prep.    Prep instructions sent via [employer]         Radha Laughlin RN  Endoscopy Procedure Pre Assessment   [phone] option 2

## 2024-11-21 NOTE — TELEPHONE ENCOUNTER
"Endoscopy Scheduling Screen    Have you had any respiratory illness or flu-like symptoms in the last 10 days?  No    What is your communication preference for Instructions and/or Bowel Prep?   MyChart    What insurance is in the chart?  Other:  [employer]    Ordering/Referring Provider: Shruthi Osorio PA-C in  FAMILY PRACTICE   (If ordering provider performs procedure, schedule with ordering provider unless otherwise instructed. )    BMI: Estimated body mass index is 33.43 kg/m  as calculated from the following:    Height as of 11/6/24: 1.575 m (5' 2\").    Weight as of 11/6/24: 82.9 kg (182 lb 12.8 oz).     Sedation Ordered  moderate sedation.   If patient BMI > 50 do not schedule in [facility].    If patient BMI > 45 [facility]    Are you taking methadone or Suboxone?  NO, No RN review required.    Have you been diagnosed and are being treated for severe PTSD or severe anxiety?  NO, No RN review required.    Are you taking any prescription medications for pain 3 or more times per week?   NO, No RN review required.    Do you have a history of malignant hyperthermia?  No    (Females) Are you currently pregnant?   No     Have you been diagnosed or told you have pulmonary hypertension?   No    Do you have an LVAD?  No    Have you been told you have moderate to severe sleep apnea?  No.    Have you been told you have COPD, asthma, or any other lung disease?  No    Do you have any heart conditions?  No     Have you ever had or are you waiting for an organ transplant?  No. Continue scheduling, no site restrictions.    Have you had a stroke or transient ischemic attack (TIA aka \"mini stroke\" in the last 6 months?   No    Have you been diagnosed with or been told you have cirrhosis of the liver?   No.    Are you currently on dialysis?   No    Do you need assistance transferring?   No    BMI: Estimated body mass index is 33.43 kg/m  as calculated from the following:    Height as of 11/6/24: 1.575 m (5' " "2\").    Weight as of 11/6/24: 82.9 kg (182 lb 12.8 oz).     Is patients BMI > 40 and scheduling location [facility]?  No    Do you take an injectable or oral medication for weight loss or diabetes (excluding insulin)?  No    Do you take the medication Naltrexone?  No    Do you take blood thinners?  No       Prep   Are you currently on dialysis or do you have chronic kidney disease?  No    Do you have a diagnosis of diabetes?  No    Do you have a diagnosis of cystic fibrosis (CF)?  No    On a regular basis do you go 3 -5 days between bowel movements?  No    BMI > 40?  No    Preferred Pharmacy:    [id number]  [place], [place] - [address]  [id number]  [place] [address]  Phone: [phone] Fax: [phone]    Final Scheduling Details     Procedure scheduled  Colonoscopy    Surgeon:  Abby     Date of procedure:  12.3.24     Pre-OP / PAC:   No - Not required for this site.    Location  [facility] - [facility] - Patient preference.    Sedation   Moderate Sedation - Per order.      Patient Reminders:   You will receive a call from a Nurse to review instructions and health history.  This assessment must be completed prior to your procedure.  Failure to complete the Nurse assessment may result in the procedure being cancelled.      On the day of your procedure, please designate an adult(s) who can drive you home stay with you for the next 24 hours. The medicines used in the exam will make you sleepy. You will not be able to drive.      You cannot take public transportation, ride share services, or non-medical taxi service without a responsible caregiver.  Medical transport services are allowed with the requirement that a responsible caregiver will receive you at your destination.  We require that drivers and caregivers are confirmed prior to your procedure.    "

## 2024-11-26 ENCOUNTER — TELEPHONE (OUTPATIENT)
Dept: FAMILY MEDICINE | Facility: CLINIC | Age: 64
End: 2024-11-26
Payer: COMMERCIAL

## 2024-11-26 NOTE — TELEPHONE ENCOUNTER
Routing to Shruthi haskins    Patient asking if she should have something stronger/different than Omeprazole?    Should she increase Omeprazole to twice daily?  Start taking in the evening?    Patient states she has been having difficulties with acid reflux.  Patient states she stated she was fine at her last appointment but pain has returned.    Patient states breakfast is the worst time and improves as day go on and becomes better in the afternoon.    Office visit 11/6    Since starting the omeprazole she hasn't been having the pain anymore, but the cramping and diarrhea have persisted       RN received call from patient regarding above.    Joe Bravo, RN, BSN, PHN  [facility]

## 2024-11-27 NOTE — TELEPHONE ENCOUNTER
Patient/family called back and RN relayed provider's message. Patient/family verbalized understanding.     Mari Qureshi RN, BSN  [facility] [place]

## 2024-11-27 NOTE — TELEPHONE ENCOUNTER
Omeprazole won't necessarily treat abdominal cramping and diarrhea though. Will only treat acid reflux symptoms.   She can increase the omeprazole to 40mg daily, yes

## 2024-11-27 NOTE — TELEPHONE ENCOUNTER
Called preferred number for pt, no answer, left VM for pt to return call to clinic at [phone].     Thelma Blood RN on 11/27/2024 at 12:27 PM

## 2024-12-03 ENCOUNTER — APPOINTMENT (OUTPATIENT)
Dept: LAB | Facility: CLINIC | Age: 64
End: 2024-12-03
Payer: COMMERCIAL

## 2024-12-03 ENCOUNTER — HOSPITAL ENCOUNTER (OUTPATIENT)
Facility: AMBULATORY SURGERY CENTER | Age: 64
Discharge: HOME | End: 2024-12-03
Attending: COLON & RECTAL SURGERY
Payer: COMMERCIAL

## 2024-12-03 VITALS
TEMPERATURE: 98.2 F | SYSTOLIC BLOOD PRESSURE: 141 MMHG | HEART RATE: 55 BPM | RESPIRATION RATE: 16 BRPM | OXYGEN SATURATION: 100 % | DIASTOLIC BLOOD PRESSURE: 61 MMHG

## 2024-12-03 LAB — COLONOSCOPY: NORMAL

## 2024-12-03 PROCEDURE — 88305 TISSUE EXAM BY PATHOLOGIST: CPT | Mod: TC | Performed by: COLON & RECTAL SURGERY

## 2024-12-03 PROCEDURE — 88305 TISSUE EXAM BY PATHOLOGIST: CPT | Mod: 26 | Performed by: PATHOLOGY

## 2024-12-03 RX ORDER — FLUMAZENIL 0.1 MG/ML
0.2 INJECTION, SOLUTION INTRAVENOUS
Status: DISCONTINUED | OUTPATIENT
Start: 2024-12-03 | End: 2024-12-04 | Stop reason: HOSPADM

## 2024-12-03 RX ORDER — LIDOCAINE 40 MG/G
CREAM TOPICAL
Status: DISCONTINUED | OUTPATIENT
Start: 2024-12-03 | End: 2024-12-04 | Stop reason: HOSPADM

## 2024-12-03 RX ORDER — FENTANYL CITRATE 50 UG/ML
INJECTION, SOLUTION INTRAMUSCULAR; INTRAVENOUS PRN
Status: DISCONTINUED | OUTPATIENT
Start: 2024-12-03 | End: 2024-12-03 | Stop reason: HOSPADM

## 2024-12-03 RX ORDER — SODIUM CHLORIDE, SODIUM LACTATE, POTASSIUM CHLORIDE, CALCIUM CHLORIDE 600; 310; 30; 20 MG/100ML; MG/100ML; MG/100ML; MG/100ML
INJECTION, SOLUTION INTRAVENOUS CONTINUOUS
Status: DISCONTINUED | OUTPATIENT
Start: 2024-12-03 | End: 2024-12-04 | Stop reason: HOSPADM

## 2024-12-03 RX ORDER — NALOXONE HYDROCHLORIDE 0.4 MG/ML
0.4 INJECTION, SOLUTION INTRAMUSCULAR; INTRAVENOUS; SUBCUTANEOUS
Status: DISCONTINUED | OUTPATIENT
Start: 2024-12-03 | End: 2024-12-04 | Stop reason: HOSPADM

## 2024-12-03 RX ORDER — ONDANSETRON 2 MG/ML
4 INJECTION INTRAMUSCULAR; INTRAVENOUS
Status: DISCONTINUED | OUTPATIENT
Start: 2024-12-03 | End: 2024-12-04 | Stop reason: HOSPADM

## 2024-12-03 RX ORDER — NALOXONE HYDROCHLORIDE 0.4 MG/ML
0.2 INJECTION, SOLUTION INTRAMUSCULAR; INTRAVENOUS; SUBCUTANEOUS
Status: DISCONTINUED | OUTPATIENT
Start: 2024-12-03 | End: 2024-12-04 | Stop reason: HOSPADM

## 2024-12-03 RX ORDER — ONDANSETRON 4 MG/1
4 TABLET, ORALLY DISINTEGRATING ORAL EVERY 6 HOURS PRN
Status: DISCONTINUED | OUTPATIENT
Start: 2024-12-03 | End: 2024-12-04 | Stop reason: HOSPADM

## 2024-12-03 RX ORDER — DIPHENHYDRAMINE HYDROCHLORIDE 50 MG/ML
INJECTION INTRAMUSCULAR; INTRAVENOUS PRN
Status: DISCONTINUED | OUTPATIENT
Start: 2024-12-03 | End: 2024-12-03 | Stop reason: HOSPADM

## 2024-12-03 RX ORDER — PROCHLORPERAZINE MALEATE 5 MG/1
5 TABLET ORAL EVERY 6 HOURS PRN
Status: DISCONTINUED | OUTPATIENT
Start: 2024-12-03 | End: 2024-12-04 | Stop reason: HOSPADM

## 2024-12-03 RX ORDER — ONDANSETRON 2 MG/ML
4 INJECTION INTRAMUSCULAR; INTRAVENOUS EVERY 6 HOURS PRN
Status: DISCONTINUED | OUTPATIENT
Start: 2024-12-03 | End: 2024-12-04 | Stop reason: HOSPADM

## 2024-12-05 LAB
PATH REPORT.COMMENTS IMP SPEC: NORMAL
PATH REPORT.FINAL DX SPEC: NORMAL
PATH REPORT.GROSS SPEC: NORMAL
PATH REPORT.MICROSCOPIC SPEC OTHER STN: NORMAL
PATH REPORT.RELEVANT HX SPEC: NORMAL
PHOTO IMAGE: NORMAL

## 2025-01-04 ENCOUNTER — HOSPITAL ENCOUNTER (EMERGENCY)
Facility: CLINIC | Age: 65
Discharge: HOME | End: 2025-01-04
Attending: PHYSICIAN ASSISTANT | Admitting: PHYSICIAN ASSISTANT
Payer: COMMERCIAL

## 2025-01-04 ENCOUNTER — APPOINTMENT (OUTPATIENT)
Dept: GENERAL RADIOLOGY | Facility: CLINIC | Age: 65
End: 2025-01-04
Attending: PHYSICIAN ASSISTANT
Payer: COMMERCIAL

## 2025-01-04 ENCOUNTER — APPOINTMENT (OUTPATIENT)
Dept: ULTRASOUND IMAGING | Facility: CLINIC | Age: 65
End: 2025-01-04
Attending: PHYSICIAN ASSISTANT
Payer: COMMERCIAL

## 2025-01-04 VITALS
SYSTOLIC BLOOD PRESSURE: 141 MMHG | TEMPERATURE: 97.6 F | OXYGEN SATURATION: 98 % | DIASTOLIC BLOOD PRESSURE: 101 MMHG | HEART RATE: 70 BPM | RESPIRATION RATE: 16 BRPM

## 2025-01-04 DIAGNOSIS — M71.21 POPLITEAL CYST, RIGHT: ICD-10-CM

## 2025-01-04 PROCEDURE — 73562 X-RAY EXAM OF KNEE 3: CPT | Mod: RT

## 2025-01-04 PROCEDURE — G0463 HOSPITAL OUTPT CLINIC VISIT: HCPCS | Mod: 25 | Performed by: PHYSICIAN ASSISTANT

## 2025-01-04 PROCEDURE — 93971 EXTREMITY STUDY: CPT | Mod: RT

## 2025-01-04 PROCEDURE — 99214 OFFICE O/P EST MOD 30 MIN: CPT | Performed by: PHYSICIAN ASSISTANT

## 2025-01-04 ASSESSMENT — COLUMBIA-SUICIDE SEVERITY RATING SCALE - C-SSRS
1. IN THE PAST MONTH, HAVE YOU WISHED YOU WERE DEAD OR WISHED YOU COULD GO TO SLEEP AND NOT WAKE UP?: NO
6. HAVE YOU EVER DONE ANYTHING, STARTED TO DO ANYTHING, OR PREPARED TO DO ANYTHING TO END YOUR LIFE?: NO
2. HAVE YOU ACTUALLY HAD ANY THOUGHTS OF KILLING YOURSELF IN THE PAST MONTH?: NO

## 2025-01-04 ASSESSMENT — ACTIVITIES OF DAILY LIVING (ADL)
ADLS_ACUITY_SCORE: 41
ADLS_ACUITY_SCORE: 41

## 2025-01-04 NOTE — ED PROVIDER NOTES
History     Chief Complaint   Patient presents with    Knee Pain    Ear Problem     HPI  Muriel Porras is a 64 year old female who presents to urgent care concerns over the last 1 week history of right knee pain, swelling.  She denies any instigating injury or trauma.  She has noted pain and swelling primarily anterior however will have shooting pain down the front and back of her leg.  Pain is exacerbated by movement, changes in position.  No clear alleviating factors.  She does complain of intermittent sensation of the knee locking.  No obvious overlying erythema rashes or skin changes.  She does have history of chronic varicose veins.  She is concerned that she does have a Baker's cyst several years ago and states that pain feels currently similar.  She contacted a virtual healthcare provider who recommended evaluation for DVT rule out given concerns for leg pain.      Allergies:  Allergies   Allergen Reactions    Aspirin      Mouth lesions     Tylenol [Acetaminophen] Nausea and Vomiting       Problem List:    Patient Active Problem List    Diagnosis Date Noted    Acquired flat foot, left 05/10/2021     Priority: Medium     Added automatically from request for surgery [id number]      Osteopenia of both hips 07/15/2019     Priority: Medium    Hypothyroidism due to acquired atrophy of thyroid 04/28/2016     Priority: Medium        Past Medical History:    Past Medical History:   Diagnosis Date    Arthritis 25 YEARS AGO?    Hypothyroidism (acquired)        Past Surgical History:    Past Surgical History:   Procedure Laterality Date    BREAST SURGERY  16 YEARS AGO    COLONOSCOPY  10/2019 ?    COLONOSCOPY N/A 12/3/2024    Procedure: COLONOSCOPY, WITH POLYPECTOMY AND BIOPSY;  Surgeon: Najma Mora MD;  Location: [facility] OR    COLONOSCOPY WITH CO2 INSUFFLATION N/A 09/13/2019    Procedure: COLONOSCOPY, WITH CO2 INSUFFLATION;  Surgeon: Marshall Kaba DO;  Location: [facility] OR    COLONOSCOPY WITH CO2 INSUFFLATION N/A  12/3/2024    Procedure: Colonoscopy with CO2 insufflation;  Surgeon: Najma Mora MD;  Location: [facility] OR    RECONSTRUCT FLAT FOOT Left 2021    Procedure: LEFT FLATFOOT RECONSTRUCTION;  Surgeon: Marko Maya DPM;  Location: [facility] OR       Family History:    Family History   Problem Relation Age of Onset    Cerebrovascular Disease Mother 84    Myocardial Infarction Father         Stents    Pacemaker Father     Hypothyroidism Father     Thyroid Disease Father     Hypothyroidism Sister     Colon Cancer Sister 65    Hypothyroidism Sister     No Known Problems Sister     No Known Problems Brother     Colon Cancer Maternal Grandfather     Colon Polyps Daughter        Social History:  Marital Status:   [2]  Social History     Tobacco Use    Smoking status: Former     Current packs/day: 0.00     Average packs/day: 1 pack/day for 18.0 years (18.0 ttl pk-yrs)     Types: Cigarettes     Start date: 1998     Quit date: 2016     Years since quittin.7     Passive exposure: Past    Smokeless tobacco: Never   Vaping Use    Vaping status: Never Used   Substance Use Topics    Alcohol use: Yes     Comment: RARELY    Drug use: No        Medications:    Cholecalciferol (VITAMIN D3) 125 MCG (5000 UT) CHEW  levothyroxine (SYNTHROID/LEVOTHROID) 112 MCG tablet  omeprazole (PRILOSEC) 20 MG DR capsule      Review of Systems  CONSTITUTIONAL:NEGATIVE for fever, chills, change in weight  INTEGUMENTARY/SKIN: NEGATIVE for worrisome rashes, moles or lesions  RESP:NEGATIVE for significant cough or SOB  MUSCULOSKELETAL: POSITIVE  for right knee/leg pain NEGATIVE for other concerning arthralgias or myalgias   NEURO: NEGATIVE for numbness, paresthesias   Physical Exam   BP: (!) 141/101  Pulse: 70  Temp: 97.6  F (36.4  C)  Resp: 16  SpO2: 98 %  Physical Exam  Constitutional:       General: She is not in acute distress.     Appearance: She is not ill-appearing or toxic-appearing.   HENT:      Head: Normocephalic and  atraumatic.   Cardiovascular:      Pulses:           Dorsalis pedis pulses are 2+ on the right side.        Posterior tibial pulses are 2+ on the right side.      Comments: Varicose veins to lower extremities bilaterally  Musculoskeletal:      Right knee: Swelling present. No deformity, erythema, ecchymosis, lacerations or crepitus. Decreased range of motion. Tenderness present. No LCL laxity, MCL laxity, ACL laxity or PCL laxity. Normal pulse.      Right lower leg: Tenderness present. No swelling, deformity, lacerations or bony tenderness. No edema.   Skin:     General: Skin is warm and dry.      Capillary Refill: Capillary refill takes less than 2 seconds.      Findings: No abrasion, ecchymosis, erythema, laceration or rash.   Neurological:      Mental Status: She is alert.      Sensory: No sensory deficit.       ED Course        Procedures              Critical Care time:  none       No results found for this or any previous visit (from the past 24 hours).    Medications - No data to display    Assessments & Plan (with Medical Decision Making)     I have reviewed the nursing notes.    I have reviewed the findings, diagnosis, plan and need for follow up with the patient.         Discharge Medication List as of 1/4/2025  1:13 PM          Final diagnoses:   Popliteal cyst, right     64-year-old female presents to urgent care with concern over one week history of atraumatic right knee pain rating to her leg.  She had elevated blood pressure upon arrival, remainder vital signs stable.  Physical exam findings significant for tenderness palpation, swelling, decreased range of motion of the right knee, distal neurovascular status was intact.  As part of evaluation she had x-ray of the knee which was negative for evidence of acute fracture or malalignment, mild medial and patellofemoral changes.  No effusion.  Ultrasound was obtained and was negative for evidence of DVT.  Simple appearing cyst in right popliteal fossa.   Symptoms likely secondary to popliteal cyst.  Did discuss potential for osteoarthritis, meniscal injury contributing as well.  She was discharged home with instructions for symptomatic treatment with rest, ice, OTC pain relievers as needed.  Follow-up with Ortho/sports medicine for recheck within the next week,  referral placed.  Worrisome reasons to return to the ER/UC sooner discussed.    Disclaimer: This note consists of symbols derived from keyboarding, dictation, and/or voice recognition software. As a result, there may be errors in the script that have gone undetected.  Please consider this when interpreting information found in the chart.      1/4/2025   [facility] EMERGENCY DEPT       Celeste Keenan PA-C  01/08/25 [id number]

## 2025-01-04 NOTE — ED TRIAGE NOTES
Hx of bakers cyst of the right knee   Pt reports 1-2 weeks of right knee pain     would like bilateral ear check as well - finished amoxicillin 2 weeks ago

## 2025-01-06 ENCOUNTER — PATIENT OUTREACH (OUTPATIENT)
Dept: CARE COORDINATION | Facility: CLINIC | Age: 65
End: 2025-01-06
Payer: COMMERCIAL

## 2025-01-12 ENCOUNTER — HEALTH MAINTENANCE LETTER (OUTPATIENT)
Age: 65
End: 2025-01-12

## 2025-01-13 ENCOUNTER — PATIENT OUTREACH (OUTPATIENT)
Dept: CARE COORDINATION | Facility: CLINIC | Age: 65
End: 2025-01-13

## 2025-03-13 ENCOUNTER — VIRTUAL VISIT (OUTPATIENT)
Dept: FAMILY MEDICINE | Facility: CLINIC | Age: 65
End: 2025-03-13
Payer: COMMERCIAL

## 2025-03-13 DIAGNOSIS — M71.21 SYNOVIAL CYST OF RIGHT POPLITEAL SPACE: Primary | ICD-10-CM

## 2025-03-13 NOTE — PROGRESS NOTES
"Muriel is a 64 year old who is being evaluated via a billable telephone visit.    What phone number would you like to be contacted at? [phone]   How would you like to obtain your AVS? [employer]  Originating Location (pt. Location): Home    Distant Location (provider location):  On-site  Telephone visit completed due to the patient did not consent to a video visit.    Assessment & Plan       ICD-10-CM    1. Synovial cyst of right popliteal space  M71.21 Orthopedic  Referral          Imaging reviewed from January. Referral to Ortho for further eval/tx.      BMI  Estimated body mass index is 33.43 kg/m  as calculated from the following:    Height as of 11/6/24: 1.575 m (5' 2\").    Weight as of 11/6/24: 82.9 kg (182 lb 12.8 oz).     Return in about 6 months (around 9/13/2025) for your annual physical, fasting labs, with Shruthi, in person.      Subjective   Muriel is a 64 year old, presenting for the following health issues:  Follow Up and Referral        3/13/2025     6:44 AM   Additional Questions   Roomed by Patient completed questions via [employer]     History of Present Illness       Reason for visit:  Bakers cyst    She eats 2-3 servings of fruits and vegetables daily.She consumes 0 sweetened beverage(s) daily.She exercises with enough effort to increase her heart rate 10 to 19 minutes per day.  She exercises with enough effort to increase her heart rate 3 or less days per week.   She is taking medications regularly.      Was at  in [place] then to [facility]  Was given a Cortisone injection - took a couple days to work, but only lasted for 1 week  Not as bad as before but has to lift that leg first when getting into the shower  Needs to see Ortho        Review of Systems  Constitutional, musculoskeletal systems are negative, except as otherwise noted.      Objective           Vitals:  No vitals were obtained today due to virtual visit.    Physical Exam   General: Alert and no distress " //Respiratory: No audible wheeze, cough, or shortness of breath // Psychiatric:  Appropriate affect, tone, and pace of words          Phone call duration: 10 minutes, total of 12 mins spent on visit (chart review, documenting, etc)  Signed Electronically by: Shruthi Osorio PA-C

## 2025-03-17 ENCOUNTER — PATIENT OUTREACH (OUTPATIENT)
Dept: CARE COORDINATION | Facility: CLINIC | Age: 65
End: 2025-03-17
Payer: COMMERCIAL

## 2025-03-25 ENCOUNTER — TELEPHONE (OUTPATIENT)
Dept: ORTHOPEDICS | Facility: CLINIC | Age: 65
End: 2025-03-25
Payer: COMMERCIAL

## 2025-03-25 NOTE — TELEPHONE ENCOUNTER
Talked to patient.  She DOES NOT want to see a surgeon.  She will keep her appt for tomorrow.  Actually wants to know next steps so she does not have to see a surgeon.  She is going to find the paperwork and find out what injection she had the last time and is going to call me back with that information.

## 2025-03-25 NOTE — TELEPHONE ENCOUNTER
Patient called back with the injection name, Depomedral.     Please call back when you have a minute.  [phone]

## 2025-03-25 NOTE — TELEPHONE ENCOUNTER
Called and spoke to patient. She reports that she would like to explore non-surgical treatments and understands that Dr. Hernandez is not a surgeon. She is also aware that she had a steroid injection into her knee in January and that she would not be eligible for a repeat injection at her appointment tomorrow. She was appreciative of the call and will keep her appointment as scheduled.  Radha Alan, LAT, ATC

## 2025-03-25 NOTE — PROGRESS NOTES
ASSESSMENT & PLAN    Muriel was seen today for pain.    Diagnoses and all orders for this visit:    Primary osteoarthritis of right knee  -     meloxicam (MOBIC) 15 MG tablet; Take 1 tablet (15 mg) by mouth daily with food.    Popliteal cyst, right  -     Orthopedic  Referral  -     XR Knee Standing AP Bilat [place] Bilat Lat Right; Future      This issue is chronic and Unchanged.      # Right knee pain: Muriel Porras  was seen today for right knee pain. Symptoms had been going on for 3 months. On examination there are positive findings of medial and lateral joint line tenderness with soft tissue fullness, also with lateral distal hamstring tenderness. Imaging findings showed mild OA, worst in the medial and patellofemoral compartments. DVT US from Jan with no DVT, but small Baker's cyst which is from her OA    Likely cause of patient's condition due to OA +/- degenerative meniscal tear. Other possible conditions contributing to symptoms include hamstring tendinopapthy related to OA.  Counseled patient on nature of condition and treatment options.    - Activity: activity as tolerated and home exercises given  - OTC compression brace/ sleeve as needed  - Ice, heat, massage as needed  - Medications:      - meloxicam 15mg daily for 2-4 weeks for flare of pain, then as needed     - tylenol 1000mg three times daily as needed  - we discussed ginger, tumeric as natural food anti-inflammatories with no agreed upon dose, OTC supplements like chondroitin, collagen, glucosamine are safe to take, but no agreed upon dose and no agreed upon benefit.    - discussed healthy diet and weight and the relationship with knee pain/ OA    Follow-up: In 4 weeks if symptoms do not improve, sooner if worsening  Can consider repeat cortisone injection, HA (gel) injection, or PT referral      Nathaniel Hernandez MD  [facility] [place]    -----  Chief Complaint   Patient presents with    Right Knee - Pain  "      SUBJECTIVE  Muriel Porras is a/an 64 year old female who is seen in consultation at the request of  Celeste Keenan PA-C for evaluation of right knee.     The patient is seen by themselves.    Onset: 3 month(s) ago. Reports insidious onset without acute precipitating event.  The patient was seen in the ED on 1/4/25 and [facility] x-rays of the right knee were performed.  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  CV: distal perfusion intact   Resp: normal respiratory effort without conversational dyspnea   Psych: normal mood and affect  Gait: NORMAL  Neuro: Normal light sensory exam of right lower extremity    RIGHT KNEE  Inspection:    Mild soft tissue fullness of anterior knee  Palpation:    Tender about the  medial and lateral joint line tenderness with soft tissue fullness, also with lateral distal hamstring tenderness. Remainder of bony and ligamentous landmarks are nontender.    Small effusion is present    Patellofemoral crepitus is Absent  Range of Motion:     00 extension to 1200 flexion  Strength:    5/5 flexion/ extension    Extensor mechanism intact  Special Tests:    Positive: Beverley's    Negative: MCL/valgus stress (0 & 30 deg), LCL/varus stress (0 & 30 deg), Lachman's, posterior drawer       RADIOLOGY:  Final results and radiologist's interpretation, available in " the [facility] health record.  Images were reviewed with the patient in the office today.  My personal interpretation of the performed imaging:   -  mild OA, worst in the medial and patellofemoral compartments. DVT US from Jan with no DVT, but small Baker's cyst.     Reviewed ED notes and images from 1/4/25

## 2025-03-25 NOTE — TELEPHONE ENCOUNTER
Called patient, [facility] advising call back regarding appointment with Dr. Hernandez tomorrow 3/25. Left [facility] number to call back. If patient returns call:  -Referral was for surgical general ortho for knee, Dr. Hernandez is not a surgeon, does she want to see a surgeon?  -When was her last steroid injection? These can only be performed every 3 months  -If she is looking for nonsurgical options, okay to leave as scheduled. If she wants to see a surgeon, needs to be rescheduled.    [employer] message sent to patient as well. Will await patient response.  Radha Alan, LAT, ATC

## 2025-03-26 ENCOUNTER — OFFICE VISIT (OUTPATIENT)
Dept: ORTHOPEDICS | Facility: CLINIC | Age: 65
End: 2025-03-26
Attending: PHYSICIAN ASSISTANT
Payer: COMMERCIAL

## 2025-03-26 ENCOUNTER — ANCILLARY PROCEDURE (OUTPATIENT)
Dept: GENERAL RADIOLOGY | Facility: CLINIC | Age: 65
End: 2025-03-26
Attending: STUDENT IN AN ORGANIZED HEALTH CARE EDUCATION/TRAINING PROGRAM
Payer: COMMERCIAL

## 2025-03-26 VITALS — BODY MASS INDEX: 34.55 KG/M2 | HEIGHT: 61 IN | WEIGHT: 183 LBS

## 2025-03-26 DIAGNOSIS — M17.11 PRIMARY OSTEOARTHRITIS OF RIGHT KNEE: Primary | ICD-10-CM

## 2025-03-26 DIAGNOSIS — M71.21 POPLITEAL CYST, RIGHT: ICD-10-CM

## 2025-03-26 PROCEDURE — 99214 OFFICE O/P EST MOD 30 MIN: CPT | Performed by: STUDENT IN AN ORGANIZED HEALTH CARE EDUCATION/TRAINING PROGRAM

## 2025-03-26 RX ORDER — MELOXICAM 15 MG/1
15 TABLET ORAL
Qty: 90 TABLET | Refills: 0 | Status: SHIPPED | OUTPATIENT
Start: 2025-03-26

## 2025-03-26 ASSESSMENT — PAIN SCALES - GENERAL: PAINLEVEL_OUTOF10: MILD PAIN (3)

## 2025-03-26 NOTE — LETTER
3/26/2025      Muriel Porras  [address] [facility] [place] [address]      Dear Colleague,    Thank you for referring your patient, Muriel Porras, to the [facility]. Please see a copy of my visit note below.    ASSESSMENT & PLAN    Muriel was seen today for pain.    Diagnoses and all orders for this visit:    Primary osteoarthritis of right knee  -     meloxicam (MOBIC) 15 MG tablet; Take 1 tablet (15 mg) by mouth daily with food.    Popliteal cyst, right  -     Orthopedic  Referral  -     XR Knee Standing AP Bilat Lonepine Bilat Lat Right; Future      This issue is chronic and Unchanged.      # Right knee pain: Muriel Porras  was seen today for right knee pain. Symptoms had been going on for 3 months. On examination there are positive findings of medial and lateral joint line tenderness with soft tissue fullness, also with lateral distal hamstring tenderness. Imaging findings showed mild OA, worst in the medial and patellofemoral compartments. DVT US from Jan with no DVT, but small Baker's cyst which is from her OA    Likely cause of patient's condition due to OA +/- degenerative meniscal tear. Other possible conditions contributing to symptoms include hamstring tendinopapthy related to OA.  Counseled patient on nature of condition and treatment options.    - Activity: activity as tolerated and home exercises given  - OTC compression brace/ sleeve as needed  - Ice, heat, massage as needed  - Medications:      - meloxicam 15mg daily for 2-4 weeks for flare of pain, then as needed     - tylenol 1000mg three times daily as needed  - we discussed ginger, tumeric as natural food anti-inflammatories with no agreed upon dose, OTC supplements like chondroitin, collagen, glucosamine are safe to take, but no agreed upon dose and no agreed upon benefit.    - discussed healthy diet and weight and the relationship with knee pain/ OA    Follow-up: In 4 weeks if symptoms do not  "improve, sooner if worsening  Can consider repeat cortisone injection, HA (gel) injection, or PT referral      Nathaniel Hernandez MD  [facility]    -----  Chief Complaint   Patient presents with     Right Knee - Pain       SUBJECTIVE  Muriel Porras is a/an 64 year old female who is seen in consultation at the request of  Celeste Keenan PA-C for evaluation of right knee.     The patient is seen by themselves.    Onset: 3 month(s) ago. Reports insidious onset without acute precipitating event.  The patient was seen in the ED on 1/4/25 and [facility] x-rays of the right knee were performed.  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  CV: distal perfusion intact   Resp: normal respiratory effort without conversational dyspnea   Psych: normal mood and affect  Gait: NORMAL  Neuro: Normal light sensory exam of right lower extremity    RIGHT KNEE  Inspection:    Mild soft tissue fullness of anterior knee  Palpation:    Tender about the  medial and lateral joint line tenderness with soft tissue fullness, also with lateral distal hamstring tenderness. Remainder of bony and ligamentous landmarks are nontender.    Small effusion is present    Patellofemoral crepitus is Absent  Range of Motion:     00 extension to 1200 flexion  Strength:    5/5 flexion/ " extension    Extensor mechanism intact  Special Tests:    Positive: Beverley's    Negative: MCL/valgus stress (0 & 30 deg), LCL/varus stress (0 & 30 deg), Lachman's, posterior drawer       RADIOLOGY:  Final results and radiologist's interpretation, available in the [facility] health record.  Images were reviewed with the patient in the office today.  My personal interpretation of the performed imaging:   -  mild OA, worst in the medial and patellofemoral compartments. DVT US from Jan with no DVT, but small Baker's cyst.     Reviewed ED notes and images from 1/4/25         Again, thank you for allowing me to participate in the care of your patient.        Sincerely,        Nathaniel Hernandez MD    Electronically signed

## 2025-03-26 NOTE — PATIENT INSTRUCTIONS
# Right knee pain:   Likely cause of patient's condition due to OA +/- degenerative meniscal tear.    - Activity: activity as tolerated and home exercises given  - Ice, heat, massage as needed  - Medications:      - meloxicam 15mg daily for 2-4 weeks for flare of pain, then as needed     - tylenol 1000mg three times daily as needed    Follow-up: In 4 weeks if symptoms do not improve, sooner if worsening  Can consider repeat cortisone injection or PT referral    Please call [phone] and ask for my team if you have any questions or concerns.

## 2025-04-05 NOTE — ED PROVIDER NOTES
History     Chief Complaint   Patient presents with     Dysuria     HPI  Muriel Porras is a 61 year old female who presents for evaluation of 3 days of UTI symptoms with dysuria,  urinary urgency and frequency, and intermittent fever, chills, nausea and vomiting.  Shortly after symptom onset the dysuria symptom of UTI symptoms resolved and she felt that her UTI symptoms were spontaneously resolving and were going to go away.  She has no abdominal pain, back or flank pain.  No hematuria.  1 episode of emesis this morning, no other vomiting today.    Allergies:  Allergies   Allergen Reactions     Aspirin      Mouth lesions      Tylenol [Acetaminophen] Nausea and Vomiting       Problem List:    Patient Active Problem List    Diagnosis Date Noted     Acquired flat foot, left 05/10/2021     Priority: Medium     Added automatically from request for surgery [id number]       Osteopenia of both hips 07/15/2019     Priority: Medium     Hypothyroidism due to acquired atrophy of thyroid 04/28/2016     Priority: Medium        Past Medical History:    Past Medical History:   Diagnosis Date     Hypothyroidism (acquired)        Past Surgical History:    Past Surgical History:   Procedure Laterality Date     COLONOSCOPY WITH CO2 INSUFFLATION N/A 9/13/2019    Procedure: COLONOSCOPY, WITH CO2 INSUFFLATION;  Surgeon: Marshall Kaba DO;  Location:  OR     RECONSTRUCT FLAT FOOT Left 7/14/2021    Procedure: LEFT FLATFOOT RECONSTRUCTION;  Surgeon: Marko Maya DPM;  Location:  OR       Family History:    Family History   Problem Relation Age of Onset     Colon Cancer Maternal Grandfather      Cerebrovascular Disease Mother 84     Myocardial Infarction Father         Stents     Pacemaker Father      Hypothyroidism Father      Hypothyroidism Sister      Cancer Sister      No Known Problems Brother      Colon Polyps Daughter      Hypothyroidism Sister      No Known Problems Sister        Social History:  Marital Status:    [2]  Social History     Tobacco Use     Smoking status: Former Smoker     Packs/day: 1.00     Types: Cigarettes     Quit date: 2016     Years since quittin.3     Smokeless tobacco: Never Used   Substance Use Topics     Alcohol use: Yes     Alcohol/week: 0.0 standard drinks     Comment: rare     Drug use: No        Medications:    ondansetron (ZOFRAN ODT) 4 MG ODT tab  phenazopyridine (PYRIDIUM) 200 MG tablet  sulfamethoxazole-trimethoprim (BACTRIM DS) 800-160 MG tablet  Acetaminophen (TYLENOL) 325 MG CAPS  dexamethasone (DECADRON) 0.5 MG/5ML elixir  enoxaparin ANTICOAGULANT (LOVENOX) 40 MG/0.4ML syringe  levothyroxine (SYNTHROID/LEVOTHROID) 175 MCG tablet  meclizine (ANTIVERT) 25 MG tablet  oxyCODONE (ROXICODONE) 5 MG tablet  vitamin D3 (CHOLECALCIFEROL) 1.25 MG (65840 UT) capsule        Review of Systems  As mentioned above in the history present illness.  All other systems were reviewed and are negative.    Physical Exam   BP: 128/72  Pulse: 94  Temp: (!) 102.9  F (39.4  C)  Resp: 16  Weight: 74.8 kg (165 lb)  SpO2: 97 %      Physical Exam  Vitals and nursing note reviewed.   Constitutional:       General: She is not in acute distress.     Appearance: Normal appearance. She is well-developed. She is not ill-appearing, toxic-appearing or diaphoretic.      Comments: Pleasant, cooperative, she does not appear ill or uncomfortable.   HENT:      Head: Normocephalic and atraumatic.      Right Ear: External ear normal.      Left Ear: External ear normal.      Nose: Nose normal.      Mouth/Throat:      Mouth: Mucous membranes are moist.   Eyes:      General: No scleral icterus.     Extraocular Movements: Extraocular movements intact.      Conjunctiva/sclera: Conjunctivae normal.   Neck:      Trachea: No tracheal deviation.   Cardiovascular:      Rate and Rhythm: Normal rate and regular rhythm.      Heart sounds: Normal heart sounds. No murmur heard.   No friction rub. No gallop.    Pulmonary:      Effort:  Pulmonary effort is normal. No respiratory distress.      Breath sounds: Normal breath sounds. No wheezing, rhonchi or rales.   Abdominal:      General: There is no distension.      Palpations: Abdomen is soft.      Tenderness: There is no abdominal tenderness. There is no right CVA tenderness, left CVA tenderness, guarding or rebound.   Musculoskeletal:         General: No swelling. Normal range of motion.      Cervical back: Normal range of motion and neck supple.      Right lower leg: No edema.      Left lower leg: No edema.   Skin:     General: Skin is warm and dry.      Coloration: Skin is not pale.      Findings: No erythema or rash.   Neurological:      General: No focal deficit present.      Mental Status: She is alert and oriented to person, place, and time.      Coordination: Coordination normal.   Psychiatric:         Mood and Affect: Mood normal.         Behavior: Behavior normal.         ED Course        Procedures                Results for orders placed or performed during the hospital encounter of 07/31/21 (from the past 24 hour(s))   UA with Microscopic reflex to Culture    Specimen: Urine, Midstream   Result Value Ref Range    Color Urine Celeste (A) Colorless, Straw, Light Yellow, Yellow    Appearance Urine Cloudy (A) Clear    Glucose Urine Negative Negative mg/dL    Bilirubin Urine Negative Negative    Ketones Urine 20  (A) Negative mg/dL    Specific Gravity Urine 1.012 1.003 - 1.035    Blood Urine Moderate (A) Negative    pH Urine 6.0 5.0 - 7.0    Protein Albumin Urine 100  (A) Negative mg/dL    Urobilinogen Urine Normal Normal, 2.0 mg/dL    Nitrite Urine Negative Negative    Leukocyte Esterase Urine Large (A) Negative    Bacteria Urine Few (A) None Seen /HPF    WBC Clumps Urine Present (A) None Seen /HPF    Mucus Urine Present (A) None Seen /LPF    RBC Urine 42 (H) <=2 /HPF    WBC Urine >182 (H) <=5 /HPF    Squamous Epithelials Urine 18 (H) <=1 /HPF    Narrative    Urine Culture ordered based on  laboratory criteria   CBC with platelets differential    Narrative    The following orders were created for panel order CBC with platelets differential.  Procedure                               Abnormality         Status                     ---------                               -----------         ------                     CBC with platelets and d...[[id number]]  Abnormal            Final result                 Please view results for these tests on the individual orders.   Lactic acid whole blood   Result Value Ref Range    Lactic Acid 1.1 0.7 - 2.0 mmol/L   [place] Draw    Narrative    The following orders were created for panel order [place] Draw.  Procedure                               Abnormality         Status                     ---------                               -----------         ------                     Extra Blood Culture Bottle[[id number]]                                                  Extra Red Top Tube[[id number]]                                                          Extra Green Top (Lithium...[[id number]]                      Final result               Extra Purple Top Tube[[id number]]                            Final result               Extra Green Top (Lithium...[[id number]]                      Final result                 Please view results for these tests on the individual orders.   CBC with platelets and differential   Result Value Ref Range    WBC Count 12.9 (H) 4.0 - 11.0 10e3/uL    RBC Count 4.38 3.80 - 5.20 10e6/uL    Hemoglobin 12.9 11.7 - 15.7 g/dL    Hematocrit 38.1 35.0 - 47.0 %    MCV 87 78 - 100 fL    MCH 29.5 26.5 - 33.0 pg    MCHC 33.9 31.5 - 36.5 g/dL    RDW 12.3 10.0 - 15.0 %    Platelet Count 243 150 - 450 10e3/uL    % Neutrophils 87 %    % Lymphocytes 5 %    % Monocytes 7 %    % Eosinophils 0 %    % Basophils 0 %    % Immature Granulocytes 1 %    NRBCs per 100 WBC 0 <1 /100    Absolute Neutrophils 11.3 (H) 1.6 - 8.3 10e3/uL    Absolute Lymphocytes 0.6 (L) 0.8 - 5.3  10e3/uL    Absolute Monocytes 1.0 0.0 - 1.3 10e3/uL    Absolute Eosinophils 0.0 0.0 - 0.7 10e3/uL    Absolute Basophils 0.0 0.0 - 0.2 10e3/uL    Absolute Immature Granulocytes 0.1 (H) <=0.0 10e3/uL    Absolute NRBCs 0.0 10e3/uL   Extra Purple Top Tube   Result Value Ref Range    Hold Specimen JI    Extra Green Top (Lithium Heparin) ON ICE   Result Value Ref Range    Hold Specimen JI    Comprehensive metabolic panel   Result Value Ref Range    Sodium 136 133 - 144 mmol/L    Potassium 3.2 (L) 3.4 - 5.3 mmol/L    Chloride 101 94 - 109 mmol/L    Carbon Dioxide (CO2) 25 20 - 32 mmol/L    Anion Gap 10 3 - 14 mmol/L    Urea Nitrogen 12 7 - 30 mg/dL    Creatinine 0.83 0.52 - 1.04 mg/dL    Calcium 8.5 8.5 - 10.1 mg/dL    Glucose 139 (H) 70 - 99 mg/dL    Alkaline Phosphatase 116 40 - 150 U/L    AST 20 0 - 45 U/L    ALT 28 0 - 50 U/L    Protein Total 7.2 6.8 - 8.8 g/dL    Albumin 3.0 (L) 3.4 - 5.0 g/dL    Bilirubin Total 0.8 0.2 - 1.3 mg/dL    GFR Estimate 76 >60 mL/min/1.73m2   Extra Green Top (Lithium Heparin) Tube   Result Value Ref Range    Hold Specimen [facility]    [place] Draw    Narrative    The following orders were created for panel order [place] Draw.  Procedure                               Abnormality         Status                     ---------                               -----------         ------                     Extra Blue Top Tube[[id number]]                              Final result                 Please view results for these tests on the individual orders.   Extra Blue Top Tube   Result Value Ref Range    Hold Specimen JI        Medications   0.9% sodium chloride BOLUS (0 mL/kg × 74.8 kg Intravenous Stopped 7/31/21 1938)   cefTRIAXone IN D5W (ROCEPHIN) intermittent infusion 2 g (0 g Intravenous Stopped 7/31/21 1938)   ondansetron (ZOFRAN) injection 4 mg (4 mg Intravenous Given 7/31/21 1807)   phenazopyridine (PYRIDIUM) tablet 200 mg (200 mg Oral Given 7/31/21 1808)   ketorolac (TORADOL) injection 15 mg  (15 mg Intravenous Given 7/31/21 1939)         Assessments & Plan (with Medical Decision Making)   3 days of UTI symptoms with with fever, nausea and vomiting with a single episode of emesis earlier today, otherwise taking p.o. without difficulty, with no evidence of pyelonephritis or symptoms to suggest kidney stone.  She does not appear ill and is comfortable discharge home after a 30 mL/KG fluid bolus and ceftriaxone IV.  Will Rx Bactrim DS X 10 days and asked that she follow-up in primary care clinic in 2 days, Monday, 8/2/2021 for recheck and follow-up of urine culture results.  She understands that she may need her antibiotic therapy changed based on culture results and she understands she needs to return immediately should she feel that her condition is worsening in any way.    I have reviewed the nursing notes.    I have reviewed the findings, diagnosis, plan and need for follow up with the patient.      New Prescriptions    ONDANSETRON (ZOFRAN ODT) 4 MG ODT TAB    Take 1 tablet (4 mg) by mouth every 8 hours as needed for nausea    PHENAZOPYRIDINE (PYRIDIUM) 200 MG TABLET    Take 1 tablet (200 mg) by mouth 3 times daily as needed for irritation (for urinary discomfort and symptoms of urinary tract infect)    SULFAMETHOXAZOLE-TRIMETHOPRIM (BACTRIM DS) 800-160 MG TABLET    Take 1 tablet by mouth 2 times daily for 10 days       Final diagnoses:   Acute lower urinary tract infection       7/31/2021   [facility] EMERGENCY DEPT     Gabriella, James MARTINEZ MD  08/01/21 0028     Patient

## 2025-04-16 ENCOUNTER — TELEPHONE (OUTPATIENT)
Dept: FAMILY MEDICINE | Facility: CLINIC | Age: 65
End: 2025-04-16

## 2025-04-16 NOTE — TELEPHONE ENCOUNTER
Patient Quality Outreach    Patient is due for the following:   Physical Annual Wellness Visit    Action(s) Taken:   Schedule a Annual Wellness Visit    Type of outreach:    Phone, left message for patient/parent to call back.    Questions for provider review:    None         oRcio Camar[facility]  Chart routed to None.

## 2025-07-31 NOTE — TELEPHONE ENCOUNTER
Patient Quality Outreach    Patient is due for the following:   Breast Cancer Screening - Mammogram  Physical Annual Wellness Visit    Action(s) Taken:   Schedule a Annual Wellness Visit    Type of outreach:    Sent [employer] message.    Questions for provider review:    None         Rocio Camarillo CMA  Chart routed to None.

## (undated) DEVICE — CAST PADDING 4" UNSTERILE 9044

## (undated) DEVICE — DRILL BIT ARTHREX BB TAK MTP AR-13226

## (undated) DEVICE — Device

## (undated) DEVICE — DRILL BIT ARTHREX CAN 4.0MM AR-8967-40C

## (undated) DEVICE — SOL WATER IRRIG 1000ML BOTTLE 07139-09

## (undated) DEVICE — IMM LIMB ELEVATOR DC40-0203

## (undated) DEVICE — SU FIBERWIRE 2 38"  AR-7200

## (undated) DEVICE — DRILL BIT ARTHREX 2.5MM AR-8943-42

## (undated) DEVICE — PREP DURAPREP 26ML APL 8630

## (undated) DEVICE — SU PROLENE 4-0 PC-3 18" 8634G

## (undated) DEVICE — PREP CHLORAPREP 26ML TINTED ORANGE  260815

## (undated) DEVICE — BLADE SAW OSCILLATING STRYK MED 9.0X25X0.38MM 2296-003-111

## (undated) DEVICE — SOL WATER IRRIG 1000ML BOTTLE 2F7114

## (undated) DEVICE — BNDG ELASTIC 4"X5YDS UNSTERILE 6611-40

## (undated) DEVICE — DRAPE MINI C-ARM 4003

## (undated) DEVICE — SUCTION CANISTER MEDIVAC LINER 3000ML W/LID 65651-530

## (undated) DEVICE — GUIDE WIRE ARTHREX 0.94"X8" AR-8967K

## (undated) DEVICE — SOL NACL 0.9% IRRIG 1000ML BOTTLE 2F7124

## (undated) DEVICE — LINEN TOWEL PACK X5 5464

## (undated) DEVICE — SU VICRYL 3-0 PS-1 18" UND J683

## (undated) DEVICE — GLOVE PROTEXIS POWDER FREE 7.5 ORTHOPEDIC 2D73ET75

## (undated) DEVICE — SU VICRYL 4-0 PS-2 18" UND J496H

## (undated) DEVICE — PACK EXTREMITY SOP15EXFSD

## (undated) DEVICE — BLADE KNIFE SURG 15 371115

## (undated) RX ORDER — PROPOFOL 10 MG/ML
INJECTION, EMULSION INTRAVENOUS
Status: DISPENSED
Start: 2021-07-14

## (undated) RX ORDER — CEFAZOLIN SODIUM 2 G/100ML
INJECTION, SOLUTION INTRAVENOUS
Status: DISPENSED
Start: 2021-07-14

## (undated) RX ORDER — GLYCOPYRROLATE 0.2 MG/ML
INJECTION, SOLUTION INTRAMUSCULAR; INTRAVENOUS
Status: DISPENSED
Start: 2021-07-14

## (undated) RX ORDER — FENTANYL CITRATE 50 UG/ML
INJECTION, SOLUTION INTRAMUSCULAR; INTRAVENOUS
Status: DISPENSED
Start: 2021-07-14

## (undated) RX ORDER — ONDANSETRON 2 MG/ML
INJECTION INTRAMUSCULAR; INTRAVENOUS
Status: DISPENSED
Start: 2021-07-14

## (undated) RX ORDER — DIPHENHYDRAMINE HYDROCHLORIDE 50 MG/ML
INJECTION INTRAMUSCULAR; INTRAVENOUS
Status: DISPENSED
Start: 2024-12-03

## (undated) RX ORDER — DEXAMETHASONE SODIUM PHOSPHATE 4 MG/ML
INJECTION, SOLUTION INTRA-ARTICULAR; INTRALESIONAL; INTRAMUSCULAR; INTRAVENOUS; SOFT TISSUE
Status: DISPENSED
Start: 2021-07-14

## (undated) RX ORDER — SIMETHICONE 40MG/0.6ML
SUSPENSION, DROPS(FINAL DOSAGE FORM)(ML) ORAL
Status: DISPENSED
Start: 2019-09-13

## (undated) RX ORDER — FENTANYL CITRATE 50 UG/ML
INJECTION, SOLUTION INTRAMUSCULAR; INTRAVENOUS
Status: DISPENSED
Start: 2019-09-13

## (undated) RX ORDER — LIDOCAINE HYDROCHLORIDE 10 MG/ML
INJECTION, SOLUTION EPIDURAL; INFILTRATION; INTRACAUDAL; PERINEURAL
Status: DISPENSED
Start: 2021-07-14

## (undated) RX ORDER — FENTANYL CITRATE 50 UG/ML
INJECTION, SOLUTION INTRAMUSCULAR; INTRAVENOUS
Status: DISPENSED
Start: 2024-12-03

## (undated) RX ORDER — KETOROLAC TROMETHAMINE 30 MG/ML
INJECTION, SOLUTION INTRAMUSCULAR; INTRAVENOUS
Status: DISPENSED
Start: 2021-07-14

## (undated) RX ORDER — LIDOCAINE HYDROCHLORIDE 20 MG/ML
INJECTION, SOLUTION EPIDURAL; INFILTRATION; INTRACAUDAL; PERINEURAL
Status: DISPENSED
Start: 2021-07-14